# Patient Record
Sex: MALE | Race: WHITE | Employment: OTHER | URBAN - NONMETROPOLITAN AREA
[De-identification: names, ages, dates, MRNs, and addresses within clinical notes are randomized per-mention and may not be internally consistent; named-entity substitution may affect disease eponyms.]

---

## 2017-03-20 ENCOUNTER — OFFICE VISIT (OUTPATIENT)
Dept: FAMILY MEDICINE CLINIC | Age: 82
End: 2017-03-20

## 2017-03-20 VITALS
SYSTOLIC BLOOD PRESSURE: 140 MMHG | HEART RATE: 96 BPM | WEIGHT: 200.8 LBS | BODY MASS INDEX: 28.11 KG/M2 | DIASTOLIC BLOOD PRESSURE: 62 MMHG | HEIGHT: 71 IN

## 2017-03-20 DIAGNOSIS — E78.5 HYPERLIPIDEMIA, UNSPECIFIED HYPERLIPIDEMIA TYPE: ICD-10-CM

## 2017-03-20 DIAGNOSIS — I10 ESSENTIAL HYPERTENSION: Primary | ICD-10-CM

## 2017-03-20 PROCEDURE — 99213 OFFICE O/P EST LOW 20 MIN: CPT | Performed by: FAMILY MEDICINE

## 2017-03-20 RX ORDER — SIMVASTATIN 5 MG
5 TABLET ORAL NIGHTLY
Qty: 90 TABLET | Refills: 1 | Status: SHIPPED | OUTPATIENT
Start: 2017-03-20 | End: 2017-09-18 | Stop reason: SDUPTHER

## 2017-03-20 RX ORDER — LISINOPRIL AND HYDROCHLOROTHIAZIDE 12.5; 1 MG/1; MG/1
1 TABLET ORAL DAILY
Qty: 90 TABLET | Refills: 1 | Status: SHIPPED | OUTPATIENT
Start: 2017-03-20 | End: 2017-09-18 | Stop reason: SINTOL

## 2017-03-20 ASSESSMENT — ENCOUNTER SYMPTOMS
GASTROINTESTINAL NEGATIVE: 1
SHORTNESS OF BREATH: 0
RESPIRATORY NEGATIVE: 1
ORTHOPNEA: 0

## 2017-09-18 ENCOUNTER — OFFICE VISIT (OUTPATIENT)
Dept: FAMILY MEDICINE CLINIC | Age: 82
End: 2017-09-18
Payer: MEDICARE

## 2017-09-18 VITALS
WEIGHT: 199.7 LBS | HEART RATE: 88 BPM | HEIGHT: 71 IN | BODY MASS INDEX: 27.96 KG/M2 | SYSTOLIC BLOOD PRESSURE: 120 MMHG | DIASTOLIC BLOOD PRESSURE: 72 MMHG

## 2017-09-18 DIAGNOSIS — I95.1 ORTHOSTATIC HYPOTENSION: ICD-10-CM

## 2017-09-18 DIAGNOSIS — E78.5 HYPERLIPIDEMIA, UNSPECIFIED HYPERLIPIDEMIA TYPE: ICD-10-CM

## 2017-09-18 DIAGNOSIS — I10 ESSENTIAL HYPERTENSION: Primary | ICD-10-CM

## 2017-09-18 PROCEDURE — 99213 OFFICE O/P EST LOW 20 MIN: CPT | Performed by: FAMILY MEDICINE

## 2017-09-18 RX ORDER — SIMVASTATIN 5 MG
5 TABLET ORAL NIGHTLY
Qty: 90 TABLET | Refills: 1 | Status: SHIPPED | OUTPATIENT
Start: 2017-09-18 | End: 2018-03-19 | Stop reason: SDUPTHER

## 2017-09-18 ASSESSMENT — ENCOUNTER SYMPTOMS
RESPIRATORY NEGATIVE: 1
GASTROINTESTINAL NEGATIVE: 1
SHORTNESS OF BREATH: 0
ORTHOPNEA: 0

## 2017-09-19 ENCOUNTER — TELEPHONE (OUTPATIENT)
Dept: FAMILY MEDICINE CLINIC | Age: 82
End: 2017-09-19

## 2017-09-19 ENCOUNTER — NURSE ONLY (OUTPATIENT)
Dept: FAMILY MEDICINE CLINIC | Age: 82
End: 2017-09-19
Payer: MEDICARE

## 2017-09-19 DIAGNOSIS — E78.5 HYPERLIPIDEMIA, UNSPECIFIED HYPERLIPIDEMIA TYPE: ICD-10-CM

## 2017-09-19 LAB
ALBUMIN SERPL-MCNC: 4 G/DL (ref 3.5–5.1)
ALP BLD-CCNC: 169 U/L (ref 38–126)
ALT SERPL-CCNC: 18 U/L (ref 11–66)
ANION GAP SERPL CALCULATED.3IONS-SCNC: 13 MEQ/L (ref 8–16)
AST SERPL-CCNC: 19 U/L (ref 5–40)
BILIRUB SERPL-MCNC: 0.9 MG/DL (ref 0.3–1.2)
BUN BLDV-MCNC: 23 MG/DL (ref 7–22)
CALCIUM SERPL-MCNC: 9.2 MG/DL (ref 8.5–10.5)
CHLORIDE BLD-SCNC: 95 MEQ/L (ref 98–111)
CHOLESTEROL, TOTAL: 160 MG/DL (ref 100–199)
CO2: 28 MEQ/L (ref 23–33)
CREAT SERPL-MCNC: 1.6 MG/DL (ref 0.4–1.2)
GFR SERPL CREATININE-BSD FRML MDRD: 41 ML/MIN/1.73M2
GLUCOSE BLD-MCNC: 93 MG/DL (ref 70–108)
HDLC SERPL-MCNC: 38 MG/DL
LDL CHOLESTEROL CALCULATED: 93 MG/DL
POTASSIUM SERPL-SCNC: 4 MEQ/L (ref 3.5–5.2)
SODIUM BLD-SCNC: 136 MEQ/L (ref 135–145)
TOTAL PROTEIN: 7.4 G/DL (ref 6.1–8)
TRIGL SERPL-MCNC: 144 MG/DL (ref 0–199)

## 2017-09-19 PROCEDURE — 36415 COLL VENOUS BLD VENIPUNCTURE: CPT | Performed by: FAMILY MEDICINE

## 2017-10-30 ENCOUNTER — OFFICE VISIT (OUTPATIENT)
Dept: FAMILY MEDICINE CLINIC | Age: 82
End: 2017-10-30
Payer: MEDICARE

## 2017-10-30 VITALS
HEIGHT: 71 IN | SYSTOLIC BLOOD PRESSURE: 160 MMHG | WEIGHT: 203.2 LBS | DIASTOLIC BLOOD PRESSURE: 90 MMHG | BODY MASS INDEX: 28.45 KG/M2 | HEART RATE: 80 BPM

## 2017-10-30 DIAGNOSIS — I10 ESSENTIAL HYPERTENSION: Primary | ICD-10-CM

## 2017-10-30 PROCEDURE — 1123F ACP DISCUSS/DSCN MKR DOCD: CPT | Performed by: FAMILY MEDICINE

## 2017-10-30 PROCEDURE — 99213 OFFICE O/P EST LOW 20 MIN: CPT | Performed by: FAMILY MEDICINE

## 2017-10-30 PROCEDURE — G8419 CALC BMI OUT NRM PARAM NOF/U: HCPCS | Performed by: FAMILY MEDICINE

## 2017-10-30 PROCEDURE — 1036F TOBACCO NON-USER: CPT | Performed by: FAMILY MEDICINE

## 2017-10-30 PROCEDURE — 4040F PNEUMOC VAC/ADMIN/RCVD: CPT | Performed by: FAMILY MEDICINE

## 2017-10-30 PROCEDURE — G8427 DOCREV CUR MEDS BY ELIG CLIN: HCPCS | Performed by: FAMILY MEDICINE

## 2017-10-30 PROCEDURE — G8484 FLU IMMUNIZE NO ADMIN: HCPCS | Performed by: FAMILY MEDICINE

## 2017-10-30 RX ORDER — AMLODIPINE BESYLATE 2.5 MG/1
2.5 TABLET ORAL DAILY
Qty: 30 TABLET | Refills: 2 | Status: SHIPPED | OUTPATIENT
Start: 2017-10-30 | End: 2017-11-13 | Stop reason: SDUPTHER

## 2017-10-30 NOTE — PROGRESS NOTES
Name:  Rehan Sheriff  :    1933    Chief Complaint   Patient presents with    Dizziness     vertigo-balance issues       HPI:  Art is a little hard to figure out. See last note. Orthostatic falls. Stopped BP meds and no more falls, but has trouble getting up after sitting awhile. Uses walker at home. Seems multiple causes. Joints. Vascular disease. Physical Exam:      BP (!) 160/90 (Site: Left Arm, Position: Sitting, Cuff Size: Large Adult)   Pulse 80   Ht 5' 10.5\" (1.791 m)   Wt 203 lb 3.2 oz (92.2 kg)   BMI 28.74 kg/m²     BP is 190/106 standing. Sitting drops to 150/90. Heart with some ectopics. Lungs are clear. A&O. Here with wife and son. Assessment/Plan:      HTN. Lightheadedness without vertigo. Will use low dose Norvasc. Jon Irizarry was seen today for dizziness. Diagnoses and all orders for this visit:    Essential hypertension    Other orders  -     amLODIPine (NORVASC) 2.5 MG tablet;  Take 1 tablet by mouth daily

## 2017-11-06 ENCOUNTER — OFFICE VISIT (OUTPATIENT)
Dept: FAMILY MEDICINE CLINIC | Age: 82
End: 2017-11-06
Payer: MEDICARE

## 2017-11-06 VITALS
SYSTOLIC BLOOD PRESSURE: 172 MMHG | HEART RATE: 88 BPM | BODY MASS INDEX: 28.15 KG/M2 | WEIGHT: 199 LBS | DIASTOLIC BLOOD PRESSURE: 90 MMHG

## 2017-11-06 DIAGNOSIS — I10 ESSENTIAL HYPERTENSION: Primary | ICD-10-CM

## 2017-11-06 PROCEDURE — G8419 CALC BMI OUT NRM PARAM NOF/U: HCPCS | Performed by: FAMILY MEDICINE

## 2017-11-06 PROCEDURE — G8484 FLU IMMUNIZE NO ADMIN: HCPCS | Performed by: FAMILY MEDICINE

## 2017-11-06 PROCEDURE — 99212 OFFICE O/P EST SF 10 MIN: CPT | Performed by: FAMILY MEDICINE

## 2017-11-06 PROCEDURE — 4040F PNEUMOC VAC/ADMIN/RCVD: CPT | Performed by: FAMILY MEDICINE

## 2017-11-06 PROCEDURE — 1036F TOBACCO NON-USER: CPT | Performed by: FAMILY MEDICINE

## 2017-11-06 PROCEDURE — G8427 DOCREV CUR MEDS BY ELIG CLIN: HCPCS | Performed by: FAMILY MEDICINE

## 2017-11-06 PROCEDURE — 1123F ACP DISCUSS/DSCN MKR DOCD: CPT | Performed by: FAMILY MEDICINE

## 2017-11-07 NOTE — PROGRESS NOTES
Name:  Betty Rojas  :    1933    Chief Complaint   Patient presents with    Hypertension     states blood pressure has been elevated at home more balance issues       HPI:  See last note. No more falls off Prinzide, but BP has rebounded. No SOB or CP. We started Norvasc with no side effects. Physical Exam:      BP (!) 172/90 (Site: Right Arm, Position: Sitting, Cuff Size: Large Adult)   Pulse 88   Wt 199 lb (90.3 kg)   BMI 28.15 kg/m²     A&O. Lungs are clear. Heart in RRR with no murmur. No edema. BP high      Assessment/Plan:      HTN  Push Norvasc up. Call report in 1 week. Will need RX's. Anjalimirabear Oliver was seen today for hypertension.     Diagnoses and all orders for this visit:    Essential hypertension

## 2017-11-13 ENCOUNTER — TELEPHONE (OUTPATIENT)
Dept: FAMILY MEDICINE CLINIC | Age: 82
End: 2017-11-13

## 2017-11-13 RX ORDER — AMLODIPINE BESYLATE 5 MG/1
5 TABLET ORAL DAILY
Qty: 30 TABLET | Refills: 3 | Status: SHIPPED | OUTPATIENT
Start: 2017-11-13 | End: 2017-11-21 | Stop reason: DRUGHIGH

## 2017-11-13 RX ORDER — AMLODIPINE BESYLATE 5 MG/1
5 TABLET ORAL DAILY
Qty: 30 TABLET | Refills: 3 | Status: SHIPPED | OUTPATIENT
Start: 2017-11-13 | End: 2017-11-13 | Stop reason: SDUPTHER

## 2017-11-13 NOTE — TELEPHONE ENCOUNTER
Rx sent to [facility] James Marinelli. Needs to go to [facility]. Rx canceled @ [facility]. Please resend.

## 2017-11-13 NOTE — TELEPHONE ENCOUNTER
Art phoned with blood pressure readings- and while I was talking to him, his wife was on the phone as well. Said that his pressure this morning was 154/80. . Samara Pool states that she feels that his Norvasc 2.5 mg needs to be adjusted because she said that his pressures are running 140s-190's/88-90. Says that when he eats salty foods his pressure goes up even more. Explained to both of them to limit sodium intake as it will raise blood pressure.   Patient uses [address] if prescription adjustment    Art: [phone]

## 2017-11-21 ENCOUNTER — TELEPHONE (OUTPATIENT)
Dept: FAMILY MEDICINE CLINIC | Age: 82
End: 2017-11-21

## 2017-11-21 ENCOUNTER — NURSE ONLY (OUTPATIENT)
Dept: FAMILY MEDICINE CLINIC | Age: 82
End: 2017-11-21

## 2017-11-21 VITALS — HEART RATE: 94 BPM | DIASTOLIC BLOOD PRESSURE: 90 MMHG | SYSTOLIC BLOOD PRESSURE: 185 MMHG

## 2017-11-21 DIAGNOSIS — Z01.30 BLOOD PRESSURE CHECK: Primary | ICD-10-CM

## 2017-11-21 DIAGNOSIS — I10 ESSENTIAL HYPERTENSION: Primary | ICD-10-CM

## 2017-11-21 RX ORDER — AMLODIPINE BESYLATE 10 MG/1
10 TABLET ORAL DAILY
Qty: 30 TABLET | Refills: 3 | Status: SHIPPED | OUTPATIENT
Start: 2017-11-21 | End: 2018-03-19 | Stop reason: SDUPTHER

## 2017-11-21 RX ORDER — AMLODIPINE BESYLATE 10 MG/1
10 TABLET ORAL DAILY
COMMUNITY
End: 2017-11-21 | Stop reason: SDUPTHER

## 2017-11-21 NOTE — PROGRESS NOTES
Art came in for a non-charge BP check. It was 162/84 audible-88 irregular. Per his home BP cuff, it was 185/90-94. His BP cuff is older. They are discussing getting a new one. His Apical pulse was very irregular upon auscultaion. Dr. Josh Solis check ed him. States it was more regular for him, but was having some ectopics. Dr. Josh Solis increased his Amlodipine to 10mg daily. They will come back in next week for another  BP check.

## 2017-11-28 ENCOUNTER — NURSE ONLY (OUTPATIENT)
Dept: FAMILY MEDICINE CLINIC | Age: 82
End: 2017-11-28

## 2017-11-28 VITALS — HEART RATE: 88 BPM | DIASTOLIC BLOOD PRESSURE: 72 MMHG | SYSTOLIC BLOOD PRESSURE: 134 MMHG

## 2017-11-28 DIAGNOSIS — I10 ESSENTIAL HYPERTENSION: Primary | ICD-10-CM

## 2017-12-12 ENCOUNTER — OFFICE VISIT (OUTPATIENT)
Dept: FAMILY MEDICINE CLINIC | Age: 82
End: 2017-12-12
Payer: MEDICARE

## 2017-12-12 VITALS
BODY MASS INDEX: 27.47 KG/M2 | SYSTOLIC BLOOD PRESSURE: 146 MMHG | WEIGHT: 196.2 LBS | DIASTOLIC BLOOD PRESSURE: 90 MMHG | HEIGHT: 71 IN | HEART RATE: 62 BPM

## 2017-12-12 DIAGNOSIS — I10 ESSENTIAL HYPERTENSION: Primary | ICD-10-CM

## 2017-12-12 DIAGNOSIS — N18.30 CHRONIC RENAL FAILURE, STAGE 3 (MODERATE) (HCC): Chronic | ICD-10-CM

## 2017-12-12 LAB
ANION GAP SERPL CALCULATED.3IONS-SCNC: 18 MEQ/L (ref 8–16)
BUN BLDV-MCNC: 12 MG/DL (ref 7–22)
CALCIUM SERPL-MCNC: 9.1 MG/DL (ref 8.5–10.5)
CHLORIDE BLD-SCNC: 95 MEQ/L (ref 98–111)
CO2: 27 MEQ/L (ref 23–33)
CREAT SERPL-MCNC: 1.3 MG/DL (ref 0.4–1.2)
GFR SERPL CREATININE-BSD FRML MDRD: 53 ML/MIN/1.73M2
GLUCOSE BLD-MCNC: 140 MG/DL (ref 70–108)
POTASSIUM SERPL-SCNC: 3.6 MEQ/L (ref 3.5–5.2)
SODIUM BLD-SCNC: 140 MEQ/L (ref 135–145)

## 2017-12-12 PROCEDURE — 4040F PNEUMOC VAC/ADMIN/RCVD: CPT | Performed by: FAMILY MEDICINE

## 2017-12-12 PROCEDURE — 36415 COLL VENOUS BLD VENIPUNCTURE: CPT | Performed by: FAMILY MEDICINE

## 2017-12-12 PROCEDURE — 1123F ACP DISCUSS/DSCN MKR DOCD: CPT | Performed by: FAMILY MEDICINE

## 2017-12-12 PROCEDURE — G8419 CALC BMI OUT NRM PARAM NOF/U: HCPCS | Performed by: FAMILY MEDICINE

## 2017-12-12 PROCEDURE — G8484 FLU IMMUNIZE NO ADMIN: HCPCS | Performed by: FAMILY MEDICINE

## 2017-12-12 PROCEDURE — 99213 OFFICE O/P EST LOW 20 MIN: CPT | Performed by: FAMILY MEDICINE

## 2017-12-12 PROCEDURE — 1036F TOBACCO NON-USER: CPT | Performed by: FAMILY MEDICINE

## 2017-12-12 PROCEDURE — G8427 DOCREV CUR MEDS BY ELIG CLIN: HCPCS | Performed by: FAMILY MEDICINE

## 2017-12-12 RX ORDER — LISINOPRIL 10 MG/1
10 TABLET ORAL DAILY
Qty: 30 TABLET | Refills: 0 | Status: SHIPPED | OUTPATIENT
Start: 2017-12-12 | End: 2017-12-26 | Stop reason: SDUPTHER

## 2017-12-12 NOTE — PROGRESS NOTES
[facility]ESSIONAL [facility]  [facility]  [address] [address]  Dept: [phone]  Dept Fax: [phone]  Loc: [phone]  PROGRESS NOTE      Visit Date: 12/12/2017    Mason Rahman is a 80 y.o. male who presents today for:  Chief Complaint   Patient presents with    Hypertension       Subjective:  HPI     HTN:  On norvasc 10 mg. Labile SBP 140s-210. DBP . He is limiting his salt intake. He a home arm cuff. No chest pain or SOB. Had been on prinzide before it being stopped due to lightheadedness. Wife is present    Review of Systems  Past Medical History:   Diagnosis Date    Hyperlipidemia     Hypertension     Osteoarthritis     PAC (premature atrial contraction)     Paget disease of bone       Past Surgical History:   Procedure Laterality Date    JOINT REPLACEMENT Bilateral 2013    HIP     Family History   Problem Relation Age of Onset    Diabetes Mother     Stroke Father     Stroke Sister     Cancer Brother     Heart Disease Brother     Stroke Brother      Social History   Substance Use Topics    Smoking status: Never Smoker    Smokeless tobacco: Never Used    Alcohol use No      Current Outpatient Prescriptions   Medication Sig Dispense Refill    amLODIPine (NORVASC) 10 MG tablet Take 1 tablet by mouth daily 30 tablet 3    simvastatin (ZOCOR) 5 MG tablet Take 1 tablet by mouth nightly 90 tablet 1     No current facility-administered medications for this visit.       Allergies   Allergen Reactions    Asa [Aspirin]      bleeding     Health Maintenance   Topic Date Due    DTaP/Tdap/Td vaccine (1 - Tdap) 09/19/1952    Flu vaccine (1) 03/20/2018 (Originally 9/1/2017)    Pneumococcal low/med risk (1 of 2 - PCV13) 03/20/2018 (Originally 9/19/1998)    Zostavax vaccine  Addressed       Objective:     BP (!) 158/90 (Site: Right Arm, Position: Sitting, Cuff Size: Medium Adult)   Pulse 62   Ht 5' 10.5\" (1.791 m)   Wt 196 lb 3.2 oz (89

## 2017-12-13 ENCOUNTER — TELEPHONE (OUTPATIENT)
Dept: FAMILY MEDICINE CLINIC | Age: 82
End: 2017-12-13

## 2017-12-26 ENCOUNTER — OFFICE VISIT (OUTPATIENT)
Dept: FAMILY MEDICINE CLINIC | Age: 82
End: 2017-12-26
Payer: MEDICARE

## 2017-12-26 VITALS
HEIGHT: 70 IN | WEIGHT: 192 LBS | BODY MASS INDEX: 27.49 KG/M2 | DIASTOLIC BLOOD PRESSURE: 74 MMHG | HEART RATE: 70 BPM | SYSTOLIC BLOOD PRESSURE: 130 MMHG

## 2017-12-26 DIAGNOSIS — I10 ESSENTIAL HYPERTENSION: ICD-10-CM

## 2017-12-26 DIAGNOSIS — Z91.81 AT HIGH RISK FOR FALLS: Primary | ICD-10-CM

## 2017-12-26 PROCEDURE — 99213 OFFICE O/P EST LOW 20 MIN: CPT | Performed by: FAMILY MEDICINE

## 2017-12-26 PROCEDURE — G8484 FLU IMMUNIZE NO ADMIN: HCPCS | Performed by: FAMILY MEDICINE

## 2017-12-26 PROCEDURE — G8427 DOCREV CUR MEDS BY ELIG CLIN: HCPCS | Performed by: FAMILY MEDICINE

## 2017-12-26 PROCEDURE — G8419 CALC BMI OUT NRM PARAM NOF/U: HCPCS | Performed by: FAMILY MEDICINE

## 2017-12-26 PROCEDURE — 1036F TOBACCO NON-USER: CPT | Performed by: FAMILY MEDICINE

## 2017-12-26 PROCEDURE — 4040F PNEUMOC VAC/ADMIN/RCVD: CPT | Performed by: FAMILY MEDICINE

## 2017-12-26 PROCEDURE — 1123F ACP DISCUSS/DSCN MKR DOCD: CPT | Performed by: FAMILY MEDICINE

## 2017-12-26 RX ORDER — LISINOPRIL 10 MG/1
10 TABLET ORAL DAILY
Qty: 90 TABLET | Refills: 1 | Status: SHIPPED | OUTPATIENT
Start: 2017-12-26 | End: 2018-03-19 | Stop reason: SDUPTHER

## 2017-12-26 ASSESSMENT — ENCOUNTER SYMPTOMS
SHORTNESS OF BREATH: 0
WHEEZING: 0

## 2017-12-26 NOTE — PATIENT INSTRUCTIONS
serving is 1 slice of bread, 1 ounce of dry cereal, or ½ cup of cooked rice, pasta, or cooked cereal. Try to choose whole-grain products as much as possible. · Limit lean meat, poultry, and fish to 2 servings each day. A serving is 3 ounces, about the size of a deck of cards. · Eat 4 to 5 servings of nuts, seeds, and legumes (cooked dried beans, lentils, and split peas) each week. A serving is 1/3 cup of nuts, 2 tablespoons of seeds, or ½ cup of cooked beans or peas. · Limit fats and oils to 2 to 3 servings each day. A serving is 1 teaspoon of vegetable oil or 2 tablespoons of salad dressing. · Limit sweets and added sugars to 5 servings or less a week. A serving is 1 tablespoon jelly or jam, ½ cup sorbet, or 1 cup of lemonade. · Eat less than 2,300 milligrams (mg) of sodium a day. If you limit your sodium to 1,500 mg a day, you can lower your blood pressure even more. Tips for success  · Start small. Do not try to make dramatic changes to your diet all at once. You might feel that you are missing out on your favorite foods and then be more likely to not follow the plan. Make small changes, and stick with them. Once those changes become habit, add a few more changes. · Try some of the following:  ¨ Make it a goal to eat a fruit or vegetable at every meal and at snacks. This will make it easy to get the recommended amount of fruits and vegetables each day. ¨ Try yogurt topped with fruit and nuts for a snack or healthy dessert. ¨ Add lettuce, tomato, cucumber, and onion to sandwiches. ¨ Combine a ready-made pizza crust with low-fat mozzarella cheese and lots of vegetable toppings. Try using tomatoes, squash, spinach, broccoli, carrots, cauliflower, and onions. ¨ Have a variety of cut-up vegetables with a low-fat dip as an appetizer instead of chips and dip. ¨ Sprinkle sunflower seeds or chopped almonds over salads. Or try adding chopped walnuts or almonds to cooked vegetables.   ¨ Try some vegetarian meals using beans and peas. Add garbanzo or kidney beans to salads. Make burritos and tacos with mashed virgen beans or black beans. Where can you learn more? Go to https://jujueb.Zbird. org and sign in to your bluebottlebiz account. Enter U583 in the [employer] box to learn more about \"DASH Diet: Care Instructions. \"     If you do not have an account, please click on the \"Sign Up Now\" link. Current as of: September 21, 2016  Content Version: 11.4  © 0854-6145 Healthwise, Incorporated. Care instructions adapted under license by [facility] [place] ([facility]). If you have questions about a medical condition or this instruction, always ask your healthcare professional. [address] any warranty or liability for your use of this information.

## 2018-03-19 ENCOUNTER — OFFICE VISIT (OUTPATIENT)
Dept: FAMILY MEDICINE CLINIC | Age: 83
End: 2018-03-19
Payer: MEDICARE

## 2018-03-19 VITALS — SYSTOLIC BLOOD PRESSURE: 138 MMHG | HEART RATE: 76 BPM | DIASTOLIC BLOOD PRESSURE: 80 MMHG

## 2018-03-19 DIAGNOSIS — I10 ESSENTIAL HYPERTENSION: ICD-10-CM

## 2018-03-19 DIAGNOSIS — E78.5 HYPERLIPIDEMIA, UNSPECIFIED HYPERLIPIDEMIA TYPE: ICD-10-CM

## 2018-03-19 PROCEDURE — 4040F PNEUMOC VAC/ADMIN/RCVD: CPT | Performed by: FAMILY MEDICINE

## 2018-03-19 PROCEDURE — G8419 CALC BMI OUT NRM PARAM NOF/U: HCPCS | Performed by: FAMILY MEDICINE

## 2018-03-19 PROCEDURE — 1123F ACP DISCUSS/DSCN MKR DOCD: CPT | Performed by: FAMILY MEDICINE

## 2018-03-19 PROCEDURE — G8427 DOCREV CUR MEDS BY ELIG CLIN: HCPCS | Performed by: FAMILY MEDICINE

## 2018-03-19 PROCEDURE — G8484 FLU IMMUNIZE NO ADMIN: HCPCS | Performed by: FAMILY MEDICINE

## 2018-03-19 PROCEDURE — 1036F TOBACCO NON-USER: CPT | Performed by: FAMILY MEDICINE

## 2018-03-19 PROCEDURE — 99213 OFFICE O/P EST LOW 20 MIN: CPT | Performed by: FAMILY MEDICINE

## 2018-03-19 RX ORDER — SIMVASTATIN 5 MG
5 TABLET ORAL NIGHTLY
Qty: 90 TABLET | Refills: 0 | Status: SHIPPED | OUTPATIENT
Start: 2018-03-19 | End: 2018-05-23 | Stop reason: SDUPTHER

## 2018-03-19 RX ORDER — LISINOPRIL 10 MG/1
10 TABLET ORAL DAILY
Qty: 90 TABLET | Refills: 0 | Status: SHIPPED | OUTPATIENT
Start: 2018-03-19 | End: 2018-05-23 | Stop reason: SDUPTHER

## 2018-03-19 RX ORDER — AMLODIPINE BESYLATE 10 MG/1
10 TABLET ORAL DAILY
Qty: 90 TABLET | Refills: 0 | Status: SHIPPED | OUTPATIENT
Start: 2018-03-19 | End: 2018-05-23 | Stop reason: SDUPTHER

## 2018-03-19 ASSESSMENT — ENCOUNTER SYMPTOMS
GASTROINTESTINAL NEGATIVE: 1
SHORTNESS OF BREATH: 0
RESPIRATORY NEGATIVE: 1
ORTHOPNEA: 0

## 2018-05-23 DIAGNOSIS — E78.5 HYPERLIPIDEMIA, UNSPECIFIED HYPERLIPIDEMIA TYPE: ICD-10-CM

## 2018-05-23 DIAGNOSIS — I10 ESSENTIAL HYPERTENSION: ICD-10-CM

## 2018-05-24 RX ORDER — SIMVASTATIN 5 MG
TABLET ORAL
Qty: 90 TABLET | Refills: 0 | Status: SHIPPED | OUTPATIENT
Start: 2018-05-24 | End: 2018-07-26 | Stop reason: SDUPTHER

## 2018-05-24 RX ORDER — LISINOPRIL 10 MG/1
TABLET ORAL
Qty: 90 TABLET | Refills: 0 | Status: SHIPPED | OUTPATIENT
Start: 2018-05-24 | End: 2018-07-26 | Stop reason: SDUPTHER

## 2018-05-24 RX ORDER — AMLODIPINE BESYLATE 10 MG/1
TABLET ORAL
Qty: 90 TABLET | Refills: 0 | Status: SHIPPED | OUTPATIENT
Start: 2018-05-24 | End: 2018-07-26 | Stop reason: SDUPTHER

## 2018-07-14 ENCOUNTER — APPOINTMENT (OUTPATIENT)
Dept: CT IMAGING | Age: 83
DRG: 418 | End: 2018-07-14
Payer: MEDICARE

## 2018-07-14 ENCOUNTER — HOSPITAL ENCOUNTER (INPATIENT)
Age: 83
LOS: 4 days | Discharge: HOME HEALTH | DRG: 418 | End: 2018-07-18
Attending: INTERNAL MEDICINE | Admitting: SURGERY
Payer: MEDICARE

## 2018-07-14 ENCOUNTER — APPOINTMENT (OUTPATIENT)
Dept: GENERAL RADIOLOGY | Age: 83
DRG: 418 | End: 2018-07-14
Payer: MEDICARE

## 2018-07-14 ENCOUNTER — APPOINTMENT (OUTPATIENT)
Dept: ULTRASOUND IMAGING | Age: 83
DRG: 418 | End: 2018-07-14
Payer: MEDICARE

## 2018-07-14 DIAGNOSIS — Z98.890 S/P RIGHT INGUINAL HERNIA REPAIR: ICD-10-CM

## 2018-07-14 DIAGNOSIS — R10.9 ABDOMINAL PAIN, UNSPECIFIED ABDOMINAL LOCATION: Primary | ICD-10-CM

## 2018-07-14 DIAGNOSIS — Z90.49 S/P LAPAROSCOPIC CHOLECYSTECTOMY: ICD-10-CM

## 2018-07-14 DIAGNOSIS — G89.18 ACUTE POSTOPERATIVE PAIN: ICD-10-CM

## 2018-07-14 DIAGNOSIS — Z87.19 S/P RIGHT INGUINAL HERNIA REPAIR: ICD-10-CM

## 2018-07-14 PROBLEM — K81.9 CHOLECYSTITIS: Status: ACTIVE | Noted: 2018-07-14

## 2018-07-14 LAB
ALBUMIN SERPL-MCNC: 4 G/DL (ref 3.5–5.1)
ALP BLD-CCNC: 201 U/L (ref 38–126)
ALT SERPL-CCNC: 9 U/L (ref 11–66)
AMYLASE: 68 U/L (ref 20–104)
ANION GAP SERPL CALCULATED.3IONS-SCNC: 17 MEQ/L (ref 8–16)
AST SERPL-CCNC: 17 U/L (ref 5–40)
BACTERIA: ABNORMAL /HPF
BASOPHILS # BLD: 0.1 %
BASOPHILS ABSOLUTE: 0 THOU/MM3 (ref 0–0.1)
BILIRUB SERPL-MCNC: 0.6 MG/DL (ref 0.3–1.2)
BILIRUBIN DIRECT: < 0.2 MG/DL (ref 0–0.3)
BILIRUBIN URINE: NEGATIVE
BLOOD, URINE: ABNORMAL
BUN BLDV-MCNC: 10 MG/DL (ref 7–22)
CALCIUM IONIZED: 1.06 MMOL/L (ref 1.12–1.32)
CALCIUM SERPL-MCNC: 8.9 MG/DL (ref 8.5–10.5)
CASTS 2: ABNORMAL /LPF
CASTS UA: ABNORMAL /LPF
CHARACTER, URINE: CLEAR
CHLORIDE BLD-SCNC: 99 MEQ/L (ref 98–111)
CHP ED QC CHECK: NORMAL
CO2: 21 MEQ/L (ref 23–33)
COLOR: YELLOW
CREAT SERPL-MCNC: 1.5 MG/DL (ref 0.4–1.2)
CRYSTALS, UA: ABNORMAL
EKG ATRIAL RATE: 88 BPM
EKG P AXIS: 95 DEGREES
EKG P-R INTERVAL: 176 MS
EKG Q-T INTERVAL: 382 MS
EKG QRS DURATION: 84 MS
EKG QTC CALCULATION (BAZETT): 462 MS
EKG R AXIS: 1 DEGREES
EKG T AXIS: 40 DEGREES
EKG VENTRICULAR RATE: 88 BPM
EOSINOPHIL # BLD: 0 %
EOSINOPHILS ABSOLUTE: 0 THOU/MM3 (ref 0–0.4)
EPITHELIAL CELLS, UA: ABNORMAL /HPF
ERYTHROCYTE [DISTWIDTH] IN BLOOD BY AUTOMATED COUNT: 12.9 % (ref 11.5–14.5)
ERYTHROCYTE [DISTWIDTH] IN BLOOD BY AUTOMATED COUNT: 41.9 FL (ref 35–45)
GFR SERPL CREATININE-BSD FRML MDRD: 44 ML/MIN/1.73M2
GLUCOSE BLD-MCNC: 149 MG/DL (ref 70–108)
GLUCOSE BLD-MCNC: 152 MG/DL
GLUCOSE BLD-MCNC: 152 MG/DL (ref 70–108)
GLUCOSE URINE: NEGATIVE MG/DL
HCT VFR BLD CALC: 41.6 % (ref 42–52)
HEMOGLOBIN: 14.2 GM/DL (ref 14–18)
IMMATURE GRANS (ABS): 0.04 THOU/MM3 (ref 0–0.07)
IMMATURE GRANULOCYTES: 0.3 %
KETONES, URINE: NEGATIVE
LACTIC ACID: 1.8 MMOL/L (ref 0.5–2.2)
LACTIC ACID: 2.9 MMOL/L (ref 0.5–2.2)
LEUKOCYTE ESTERASE, URINE: NEGATIVE
LIPASE: 26.6 U/L (ref 5.6–51.3)
LYMPHOCYTES # BLD: 4.3 %
LYMPHOCYTES ABSOLUTE: 0.5 THOU/MM3 (ref 1–4.8)
MAGNESIUM: 1.6 MG/DL (ref 1.6–2.4)
MCH RBC QN AUTO: 30.6 PG (ref 26–33)
MCHC RBC AUTO-ENTMCNC: 34.1 GM/DL (ref 32.2–35.5)
MCV RBC AUTO: 89.7 FL (ref 80–94)
MISCELLANEOUS 2: ABNORMAL
MONOCYTES # BLD: 4.5 %
MONOCYTES ABSOLUTE: 0.5 THOU/MM3 (ref 0.4–1.3)
NITRITE, URINE: NEGATIVE
NUCLEATED RED BLOOD CELLS: 0 /100 WBC
OSMOLALITY CALCULATION: 275.7 MOSMOL/KG (ref 275–300)
PH UA: 7
PHOSPHORUS: 2.1 MG/DL (ref 2.4–4.7)
PLATELET # BLD: 181 THOU/MM3 (ref 130–400)
PLATELET ESTIMATE: ADEQUATE
PMV BLD AUTO: 9.5 FL (ref 9.4–12.4)
POTASSIUM SERPL-SCNC: 3.6 MEQ/L (ref 3.5–5.2)
PRO-BNP: 552 PG/ML (ref 0–1800)
PROCALCITONIN: 0.36 NG/ML (ref 0.01–0.09)
PROTEIN UA: NEGATIVE
RBC # BLD: 4.64 MILL/MM3 (ref 4.7–6.1)
RBC URINE: ABNORMAL /HPF
RENAL EPITHELIAL, UA: ABNORMAL
SCAN OF BLOOD SMEAR: NORMAL
SEDIMENTATION RATE, ERYTHROCYTE: 28 MM/HR (ref 0–10)
SEG NEUTROPHILS: 90.8 %
SEGMENTED NEUTROPHILS ABSOLUTE COUNT: 10.4 THOU/MM3 (ref 1.8–7.7)
SODIUM BLD-SCNC: 137 MEQ/L (ref 135–145)
SPECIFIC GRAVITY, URINE: > 1.03 (ref 1–1.03)
TOTAL PROTEIN: 7.1 G/DL (ref 6.1–8)
TROPONIN T: < 0.01 NG/ML
TSH SERPL DL<=0.05 MIU/L-ACNC: 1.14 UIU/ML (ref 0.4–4.2)
UROBILINOGEN, URINE: 1 EU/DL
WBC # BLD: 11.4 THOU/MM3 (ref 4.8–10.8)
WBC UA: ABNORMAL /HPF
YEAST: ABNORMAL

## 2018-07-14 PROCEDURE — 85025 COMPLETE CBC W/AUTO DIFF WBC: CPT

## 2018-07-14 PROCEDURE — 93010 ELECTROCARDIOGRAM REPORT: CPT | Performed by: INTERNAL MEDICINE

## 2018-07-14 PROCEDURE — 83690 ASSAY OF LIPASE: CPT

## 2018-07-14 PROCEDURE — 70450 CT HEAD/BRAIN W/O DYE: CPT

## 2018-07-14 PROCEDURE — 82150 ASSAY OF AMYLASE: CPT

## 2018-07-14 PROCEDURE — 84443 ASSAY THYROID STIM HORMONE: CPT

## 2018-07-14 PROCEDURE — 74177 CT ABD & PELVIS W/CONTRAST: CPT

## 2018-07-14 PROCEDURE — 82248 BILIRUBIN DIRECT: CPT

## 2018-07-14 PROCEDURE — 84145 PROCALCITONIN (PCT): CPT

## 2018-07-14 PROCEDURE — 76705 ECHO EXAM OF ABDOMEN: CPT

## 2018-07-14 PROCEDURE — 82948 REAGENT STRIP/BLOOD GLUCOSE: CPT

## 2018-07-14 PROCEDURE — 84100 ASSAY OF PHOSPHORUS: CPT

## 2018-07-14 PROCEDURE — 83605 ASSAY OF LACTIC ACID: CPT

## 2018-07-14 PROCEDURE — 99285 EMERGENCY DEPT VISIT HI MDM: CPT

## 2018-07-14 PROCEDURE — 83880 ASSAY OF NATRIURETIC PEPTIDE: CPT

## 2018-07-14 PROCEDURE — 99223 1ST HOSP IP/OBS HIGH 75: CPT | Performed by: SURGERY

## 2018-07-14 PROCEDURE — 82330 ASSAY OF CALCIUM: CPT

## 2018-07-14 PROCEDURE — 6360000002 HC RX W HCPCS: Performed by: SURGERY

## 2018-07-14 PROCEDURE — 81001 URINALYSIS AUTO W/SCOPE: CPT

## 2018-07-14 PROCEDURE — 85651 RBC SED RATE NONAUTOMATED: CPT

## 2018-07-14 PROCEDURE — 2580000003 HC RX 258: Performed by: RADIOLOGY

## 2018-07-14 PROCEDURE — 2580000003 HC RX 258: Performed by: INTERNAL MEDICINE

## 2018-07-14 PROCEDURE — 80053 COMPREHEN METABOLIC PANEL: CPT

## 2018-07-14 PROCEDURE — 6360000002 HC RX W HCPCS: Performed by: INTERNAL MEDICINE

## 2018-07-14 PROCEDURE — 96361 HYDRATE IV INFUSION ADD-ON: CPT

## 2018-07-14 PROCEDURE — 6360000004 HC RX CONTRAST MEDICATION: Performed by: INTERNAL MEDICINE

## 2018-07-14 PROCEDURE — 36415 COLL VENOUS BLD VENIPUNCTURE: CPT

## 2018-07-14 PROCEDURE — 83735 ASSAY OF MAGNESIUM: CPT

## 2018-07-14 PROCEDURE — 1200000003 HC TELEMETRY R&B

## 2018-07-14 PROCEDURE — 71046 X-RAY EXAM CHEST 2 VIEWS: CPT

## 2018-07-14 PROCEDURE — 2709999900 HC NON-CHARGEABLE SUPPLY

## 2018-07-14 PROCEDURE — 93005 ELECTROCARDIOGRAM TRACING: CPT | Performed by: INTERNAL MEDICINE

## 2018-07-14 PROCEDURE — 2580000003 HC RX 258: Performed by: SURGERY

## 2018-07-14 PROCEDURE — 84484 ASSAY OF TROPONIN QUANT: CPT

## 2018-07-14 PROCEDURE — 96374 THER/PROPH/DIAG INJ IV PUSH: CPT

## 2018-07-14 PROCEDURE — 6370000000 HC RX 637 (ALT 250 FOR IP): Performed by: SURGERY

## 2018-07-14 PROCEDURE — 96375 TX/PRO/DX INJ NEW DRUG ADDON: CPT

## 2018-07-14 RX ORDER — AMLODIPINE BESYLATE 10 MG/1
10 TABLET ORAL DAILY
Status: DISCONTINUED | OUTPATIENT
Start: 2018-07-14 | End: 2018-07-18 | Stop reason: HOSPADM

## 2018-07-14 RX ORDER — SODIUM CHLORIDE 9 MG/ML
INJECTION, SOLUTION INTRAVENOUS CONTINUOUS
Status: DISCONTINUED | OUTPATIENT
Start: 2018-07-14 | End: 2018-07-14

## 2018-07-14 RX ORDER — ACETAMINOPHEN 325 MG/1
650 TABLET ORAL EVERY 4 HOURS PRN
Status: DISCONTINUED | OUTPATIENT
Start: 2018-07-14 | End: 2018-07-18 | Stop reason: HOSPADM

## 2018-07-14 RX ORDER — SIMVASTATIN 10 MG
5 TABLET ORAL NIGHTLY
Status: DISCONTINUED | OUTPATIENT
Start: 2018-07-14 | End: 2018-07-18 | Stop reason: HOSPADM

## 2018-07-14 RX ORDER — FENTANYL CITRATE 50 UG/ML
50 INJECTION, SOLUTION INTRAMUSCULAR; INTRAVENOUS ONCE
Status: COMPLETED | OUTPATIENT
Start: 2018-07-14 | End: 2018-07-14

## 2018-07-14 RX ORDER — ONDANSETRON 2 MG/ML
4 INJECTION INTRAMUSCULAR; INTRAVENOUS EVERY 6 HOURS PRN
Status: DISCONTINUED | OUTPATIENT
Start: 2018-07-14 | End: 2018-07-18 | Stop reason: HOSPADM

## 2018-07-14 RX ORDER — SODIUM CHLORIDE 0.9 % (FLUSH) 0.9 %
10 SYRINGE (ML) INJECTION PRN
Status: DISCONTINUED | OUTPATIENT
Start: 2018-07-14 | End: 2018-07-18 | Stop reason: HOSPADM

## 2018-07-14 RX ORDER — OXYCODONE HYDROCHLORIDE AND ACETAMINOPHEN 5; 325 MG/1; MG/1
1 TABLET ORAL EVERY 4 HOURS PRN
Status: DISCONTINUED | OUTPATIENT
Start: 2018-07-14 | End: 2018-07-18 | Stop reason: HOSPADM

## 2018-07-14 RX ORDER — LISINOPRIL 10 MG/1
10 TABLET ORAL DAILY
Status: DISCONTINUED | OUTPATIENT
Start: 2018-07-14 | End: 2018-07-18 | Stop reason: HOSPADM

## 2018-07-14 RX ORDER — SODIUM CHLORIDE 9 MG/ML
INJECTION, SOLUTION INTRAVENOUS CONTINUOUS
Status: ACTIVE | OUTPATIENT
Start: 2018-07-14 | End: 2018-07-14

## 2018-07-14 RX ORDER — ONDANSETRON 2 MG/ML
4 INJECTION INTRAMUSCULAR; INTRAVENOUS ONCE
Status: COMPLETED | OUTPATIENT
Start: 2018-07-14 | End: 2018-07-14

## 2018-07-14 RX ORDER — SODIUM CHLORIDE 0.9 % (FLUSH) 0.9 %
10 SYRINGE (ML) INJECTION EVERY 12 HOURS SCHEDULED
Status: DISCONTINUED | OUTPATIENT
Start: 2018-07-14 | End: 2018-07-18 | Stop reason: HOSPADM

## 2018-07-14 RX ORDER — MORPHINE SULFATE 2 MG/ML
2 INJECTION, SOLUTION INTRAMUSCULAR; INTRAVENOUS EVERY 4 HOURS PRN
Status: COMPLETED | OUTPATIENT
Start: 2018-07-14 | End: 2018-07-17

## 2018-07-14 RX ORDER — 0.9 % SODIUM CHLORIDE 0.9 %
1000 INTRAVENOUS SOLUTION INTRAVENOUS ONCE
Status: COMPLETED | OUTPATIENT
Start: 2018-07-14 | End: 2018-07-14

## 2018-07-14 RX ORDER — CIPROFLOXACIN 2 MG/ML
400 INJECTION, SOLUTION INTRAVENOUS EVERY 12 HOURS
Status: DISCONTINUED | OUTPATIENT
Start: 2018-07-14 | End: 2018-07-18 | Stop reason: HOSPADM

## 2018-07-14 RX ORDER — SODIUM CHLORIDE 9 MG/ML
INJECTION, SOLUTION INTRAVENOUS CONTINUOUS
Status: DISCONTINUED | OUTPATIENT
Start: 2018-07-14 | End: 2018-07-18 | Stop reason: HOSPADM

## 2018-07-14 RX ORDER — MORPHINE SULFATE 2 MG/ML
2 INJECTION, SOLUTION INTRAMUSCULAR; INTRAVENOUS ONCE
Status: COMPLETED | OUTPATIENT
Start: 2018-07-14 | End: 2018-07-14

## 2018-07-14 RX ADMIN — IOPAMIDOL 80 ML: 755 INJECTION, SOLUTION INTRAVENOUS at 10:44

## 2018-07-14 RX ADMIN — MORPHINE SULFATE 2 MG: 2 INJECTION, SOLUTION INTRAMUSCULAR; INTRAVENOUS at 09:26

## 2018-07-14 RX ADMIN — SODIUM CHLORIDE 1000 ML: 9 INJECTION, SOLUTION INTRAVENOUS at 09:15

## 2018-07-14 RX ADMIN — SODIUM CHLORIDE: 9 INJECTION, SOLUTION INTRAVENOUS at 11:01

## 2018-07-14 RX ADMIN — FENTANYL CITRATE 50 MCG: 50 INJECTION, SOLUTION INTRAMUSCULAR; INTRAVENOUS at 10:00

## 2018-07-14 RX ADMIN — SODIUM CHLORIDE: 9 INJECTION, SOLUTION INTRAVENOUS at 17:02

## 2018-07-14 RX ADMIN — CIPROFLOXACIN 400 MG: 2 INJECTION, SOLUTION INTRAVENOUS at 20:11

## 2018-07-14 RX ADMIN — AMLODIPINE BESYLATE 10 MG: 10 TABLET ORAL at 20:11

## 2018-07-14 RX ADMIN — ONDANSETRON HYDROCHLORIDE 4 MG: 4 INJECTION, SOLUTION INTRAMUSCULAR; INTRAVENOUS at 09:26

## 2018-07-14 RX ADMIN — SIMVASTATIN 5 MG: 10 TABLET, FILM COATED ORAL at 20:11

## 2018-07-14 RX ADMIN — LISINOPRIL 10 MG: 10 TABLET ORAL at 20:11

## 2018-07-14 ASSESSMENT — PAIN DESCRIPTION - LOCATION
LOCATION: ABDOMEN;HEAD
LOCATION: ABDOMEN
LOCATION: ABDOMEN
LOCATION: ABDOMEN;HEAD
LOCATION: ABDOMEN

## 2018-07-14 ASSESSMENT — ENCOUNTER SYMPTOMS
ABDOMINAL PAIN: 1
EYE REDNESS: 0
SORE THROAT: 0
WHEEZING: 0
CONSTIPATION: 1
VOMITING: 0
EYE DISCHARGE: 0
DIARRHEA: 0
SHORTNESS OF BREATH: 0
NAUSEA: 1
COUGH: 0
BACK PAIN: 0
RHINORRHEA: 0

## 2018-07-14 ASSESSMENT — PAIN SCALES - GENERAL
PAINLEVEL_OUTOF10: 7
PAINLEVEL_OUTOF10: 4
PAINLEVEL_OUTOF10: 5
PAINLEVEL_OUTOF10: 7
PAINLEVEL_OUTOF10: 7
PAINLEVEL_OUTOF10: 5
PAINLEVEL_OUTOF10: 7
PAINLEVEL_OUTOF10: 5
PAINLEVEL_OUTOF10: 5
PAINLEVEL_OUTOF10: 6

## 2018-07-14 ASSESSMENT — PAIN DESCRIPTION - PAIN TYPE
TYPE: ACUTE PAIN

## 2018-07-14 ASSESSMENT — PAIN DESCRIPTION - DESCRIPTORS
DESCRIPTORS: ACHING;DISCOMFORT
DESCRIPTORS: ACHING

## 2018-07-14 NOTE — ED TRIAGE NOTES
Patient presents to ED with c/o nausea and headache that started last night in his sleep. Patient is also c/o lower abdominal pain. Call light in reach.

## 2018-07-14 NOTE — ED PROVIDER NOTES
dizziness, syncope, weakness, light-headedness and numbness. Hematological: Negative for adenopathy. Psychiatric/Behavioral: Negative for confusion and suicidal ideas. The patient is not nervous/anxious. PAST MEDICAL HISTORY    has a past medical history of Hyperlipidemia; Hypertension; Osteoarthritis; PAC (premature atrial contraction); and Paget disease of bone. SURGICAL HISTORY      has a past surgical history that includes joint replacement (Bilateral, ). CURRENT MEDICATIONS       Previous Medications    AMLODIPINE (NORVASC) 10 MG TABLET    TAKE 1 TABLET EVERY DAY    LISINOPRIL (PRINIVIL;ZESTRIL) 10 MG TABLET    TAKE 1 TABLET EVERY DAY    SIMVASTATIN (ZOCOR) 5 MG TABLET    TAKE 1 TABLET  NIGHTLY       ALLERGIES     is allergic to asa [aspirin]. FAMILY HISTORY     indicated that his mother is . He indicated that his father is . He indicated that the status of his sister is unknown.    family history includes Cancer in his brother; Diabetes in his mother; Heart Disease in his brother; Stroke in his brother, father, and sister. SOCIAL HISTORY      reports that he has never smoked. He has never used smokeless tobacco. He reports that he does not drink alcohol or use drugs. PHYSICAL EXAM     INITIAL VITALS:  height is 6' (1.829 m) and weight is 180 lb (81.6 kg). His oral temperature is 97.9 °F (36.6 °C). His blood pressure is 144/88 (abnormal) and his pulse is 81. His respiration is 16 and oxygen saturation is 99%. Physical Exam   Constitutional: He is oriented to person, place, and time. He appears well-developed and well-nourished. HENT:   Head: Normocephalic and atraumatic. Right Ear: External ear normal.   Left Ear: External ear normal.   Mouth/Throat: Mucous membranes are dry. Eyes: Conjunctivae are normal. Right eye exhibits no discharge. Left eye exhibits no discharge. No scleral icterus. Neck: Normal range of motion. Neck supple. No JVD present. Cardiovascular: Normal rate. An irregular rhythm present. Patient's son reports that they are aware of pt's irregular heartbeat. Pulmonary/Chest: Effort normal and breath sounds normal. No stridor. No respiratory distress. He has no wheezes. Abdominal: Soft. He exhibits no distension. Bowel sounds are absent. There is generalized tenderness. Musculoskeletal: Normal range of motion. He exhibits no edema. Right lower leg: He exhibits no swelling and no edema. Left lower leg: He exhibits no swelling and no edema. Neurological: He is alert and oriented to person, place, and time. He exhibits normal muscle tone. GCS eye subscore is 4. GCS verbal subscore is 5. GCS motor subscore is 6. Skin: Skin is warm and dry. He is not diaphoretic. No erythema. Psychiatric: He has a normal mood and affect. His behavior is normal.   Nursing note and vitals reviewed. DIFFERENTIAL DIAGNOSIS:         DIAGNOSTIC RESULTS     EKG: All EKG's are interpreted by the Emergency Department Physician who either signs or Co-signs this chart in the absence of a cardiologist.  EKG interpreted by Kassandra Delvalle MD:    EKG read and interpreted by myself with comparison to 09/20/16 gives impression of normal sinus rhythm with heart rate of 88; interval 176; QRS 84;QTc 462; axis 95 1 40.        RADIOLOGY: non-plain film images(s) such as CT, Ultrasound and MRI are read by the radiologist.    CT Head WO Contrast    (Results Pending)   CT ABDOMEN PELVIS W IV CONTRAST Additional Contrast? Radiologist Recommendation    (Results Pending)   XR CHEST STANDARD (2 VW)    (Results Pending)       LABS:   Labs Reviewed   CBC WITH AUTO DIFFERENTIAL   BASIC METABOLIC PANEL   HEPATIC FUNCTION PANEL   LIPASE   AMYLASE   TROPONIN   LACTIC ACID, PLASMA   URINE RT REFLEX TO CULTURE   TROPONIN   POCT GLUCOSE       EMERGENCY DEPARTMENT COURSE:   Vitals:    Vitals:    07/14/18 0819   BP: (!) 144/88   Pulse: 81   Resp: 16   Temp: 97.9 °F (36.6 °C)   TempSrc: Oral   SpO2: 99%   Weight: 180 lb (81.6 kg)   Height: 6' (1.829 m)       8:45 AM: The patient was seen and evaluated. MDM:  Patient clinically looked dehydrated was started and IV fluids. Patient pain was controlled with morphine and fentanyl and patient also was provided with Zofran. Patient started feeling somewhat better. CT scan of abdomen and pelvis was done which did showed some gallbladder stone and also constipation. Ultrasound of the gallbladder confirmed the stone. Patient does have some leukocytosis. I discussed this with Dr. Godwin Berrios who is the surgeon on call and he advised that patient can be admitted under his name. All of patient's lab the testing that reviewed discussed with the patient, his wife and the son in the room and also with admitting surgeon. Patient's lactic acid was slightly elevated, amylase, lipase and liver function tests were essentially normal except patient's alkaline phosphatase, which has been chronically elevated  CRITICAL CARE:   none     CONSULTS:  Dr. Jeremiah Gray:  none     FINAL IMPRESSION    No diagnosis found. DISPOSITION/PLAN   *Admit    PATIENT REFERRED TO:  No follow-up provider specified. DISCHARGE MEDICATIONS:  New Prescriptions    No medications on file       (Please note that portions of this note were completed with a voice recognition program.  Efforts were made to edit the dictations but occasionally words are mis-transcribed.)    Scribe:  Madai Rolle 7/14/18 8:45 AM Scribing for and in the presence of Jazmin Bond MD.    Signed by: Scooter Aguirre, 07/14/18 9:09 AM    Provider:  I personally performed the services described in the documentation, reviewed and edited the documentation which was dictated to the scribe in my presence, and it accurately records my words and actions.     Jazmin Bond MD 7/14/18 9:09 AM                          Jazmin Bond MD  07/14/18 1454       Jazmin Bond MD  07/20/18 [address] [place]

## 2018-07-14 NOTE — ED NOTES
Patient resting on cart with complaint of abdominal pain and headache. Dr. Ynes Sykes at bedside to discuss plan of care with patient. Family at bedside. Respirations unlabored. Call light in reach. Side rails up times 2.      Anne Alcantara RN  07/14/18 [id number]

## 2018-07-14 NOTE — ED NOTES
Patient resting on cart with complaint of abdominal pain and headache. Patient denies nausea. Family at bedside. Respirations regular and unlabored. Side rails up times 2. Call light in reach.      Audrey Thomas RN  07/14/18 [id number]

## 2018-07-14 NOTE — FLOWSHEET NOTE
07/14/18 1900   Provider Notification   Reason for Communication Review case   Provider Name Dr Jeannie Smith   Provider Notification Physician   Method of Communication Call   Response See orders   Notification Time 1900   Clarification on IV fluid order.  New order to change IV fluids to 75 ml/ hour

## 2018-07-15 PROBLEM — N18.30 STAGE 3 CHRONIC KIDNEY DISEASE (HCC): Status: ACTIVE | Noted: 2018-07-15

## 2018-07-15 PROBLEM — K40.90 RIGHT INGUINAL HERNIA: Status: ACTIVE | Noted: 2018-07-15

## 2018-07-15 LAB
ALBUMIN SERPL-MCNC: 3.3 G/DL (ref 3.5–5.1)
ALP BLD-CCNC: 177 U/L (ref 38–126)
ALT SERPL-CCNC: 15 U/L (ref 11–66)
ANION GAP SERPL CALCULATED.3IONS-SCNC: 13 MEQ/L (ref 8–16)
AST SERPL-CCNC: 24 U/L (ref 5–40)
BACTERIA: ABNORMAL /HPF
BASOPHILS # BLD: 0.2 %
BASOPHILS ABSOLUTE: 0 THOU/MM3 (ref 0–0.1)
BILIRUB SERPL-MCNC: 2 MG/DL (ref 0.3–1.2)
BILIRUBIN URINE: NEGATIVE
BLOOD, URINE: ABNORMAL
BUN BLDV-MCNC: 12 MG/DL (ref 7–22)
CALCIUM SERPL-MCNC: 8.5 MG/DL (ref 8.5–10.5)
CASTS 2: ABNORMAL /LPF
CASTS UA: ABNORMAL /LPF
CHARACTER, URINE: CLEAR
CHLORIDE BLD-SCNC: 100 MEQ/L (ref 98–111)
CO2: 21 MEQ/L (ref 23–33)
COLOR: YELLOW
CREAT SERPL-MCNC: 1.3 MG/DL (ref 0.4–1.2)
CRYSTALS, UA: ABNORMAL
EOSINOPHIL # BLD: 0.2 %
EOSINOPHILS ABSOLUTE: 0 THOU/MM3 (ref 0–0.4)
EPITHELIAL CELLS, UA: ABNORMAL /HPF
ERYTHROCYTE [DISTWIDTH] IN BLOOD BY AUTOMATED COUNT: 13 % (ref 11.5–14.5)
ERYTHROCYTE [DISTWIDTH] IN BLOOD BY AUTOMATED COUNT: 43.3 FL (ref 35–45)
GFR SERPL CREATININE-BSD FRML MDRD: 52 ML/MIN/1.73M2
GLUCOSE BLD-MCNC: 141 MG/DL (ref 70–108)
GLUCOSE URINE: NEGATIVE MG/DL
HCT VFR BLD CALC: 37.1 % (ref 42–52)
HEMOGLOBIN: 12.4 GM/DL (ref 14–18)
IMMATURE GRANS (ABS): 0.05 THOU/MM3 (ref 0–0.07)
IMMATURE GRANULOCYTES: 0.5 %
INR BLD: 1.18 (ref 0.85–1.13)
KETONES, URINE: NEGATIVE
LEUKOCYTE ESTERASE, URINE: NEGATIVE
LYMPHOCYTES # BLD: 7.7 %
LYMPHOCYTES ABSOLUTE: 0.7 THOU/MM3 (ref 1–4.8)
MAGNESIUM: 1.6 MG/DL (ref 1.6–2.4)
MCH RBC QN AUTO: 30.3 PG (ref 26–33)
MCHC RBC AUTO-ENTMCNC: 33.4 GM/DL (ref 32.2–35.5)
MCV RBC AUTO: 90.7 FL (ref 80–94)
MISCELLANEOUS 2: ABNORMAL
MONOCYTES # BLD: 10.7 %
MONOCYTES ABSOLUTE: 1 THOU/MM3 (ref 0.4–1.3)
NITRITE, URINE: NEGATIVE
NUCLEATED RED BLOOD CELLS: 0 /100 WBC
PH UA: 8
PLATELET # BLD: 156 THOU/MM3 (ref 130–400)
PMV BLD AUTO: 9.9 FL (ref 9.4–12.4)
POTASSIUM SERPL-SCNC: 3.8 MEQ/L (ref 3.5–5.2)
PROTEIN UA: NEGATIVE
RBC # BLD: 4.09 MILL/MM3 (ref 4.7–6.1)
RBC URINE: ABNORMAL /HPF
RENAL EPITHELIAL, UA: ABNORMAL
SEG NEUTROPHILS: 80.7 %
SEGMENTED NEUTROPHILS ABSOLUTE COUNT: 7.5 THOU/MM3 (ref 1.8–7.7)
SODIUM BLD-SCNC: 134 MEQ/L (ref 135–145)
SPECIFIC GRAVITY, URINE: 1.01 (ref 1–1.03)
TOTAL PROTEIN: 6.7 G/DL (ref 6.1–8)
TROPONIN T: < 0.01 NG/ML
TROPONIN T: < 0.01 NG/ML
UROBILINOGEN, URINE: 1 EU/DL
WBC # BLD: 9.3 THOU/MM3 (ref 4.8–10.8)
WBC UA: ABNORMAL /HPF
YEAST: ABNORMAL

## 2018-07-15 PROCEDURE — 2580000003 HC RX 258: Performed by: SURGERY

## 2018-07-15 PROCEDURE — 84484 ASSAY OF TROPONIN QUANT: CPT

## 2018-07-15 PROCEDURE — 6360000002 HC RX W HCPCS: Performed by: SURGERY

## 2018-07-15 PROCEDURE — 83735 ASSAY OF MAGNESIUM: CPT

## 2018-07-15 PROCEDURE — 99233 SBSQ HOSP IP/OBS HIGH 50: CPT | Performed by: FAMILY MEDICINE

## 2018-07-15 PROCEDURE — 80053 COMPREHEN METABOLIC PANEL: CPT

## 2018-07-15 PROCEDURE — 6370000000 HC RX 637 (ALT 250 FOR IP): Performed by: FAMILY MEDICINE

## 2018-07-15 PROCEDURE — 6370000000 HC RX 637 (ALT 250 FOR IP): Performed by: SURGERY

## 2018-07-15 PROCEDURE — 85025 COMPLETE CBC W/AUTO DIFF WBC: CPT

## 2018-07-15 PROCEDURE — 99231 SBSQ HOSP IP/OBS SF/LOW 25: CPT | Performed by: FAMILY MEDICINE

## 2018-07-15 PROCEDURE — 1200000003 HC TELEMETRY R&B

## 2018-07-15 PROCEDURE — 99223 1ST HOSP IP/OBS HIGH 75: CPT | Performed by: INTERNAL MEDICINE

## 2018-07-15 PROCEDURE — 85610 PROTHROMBIN TIME: CPT

## 2018-07-15 PROCEDURE — 2709999900 HC NON-CHARGEABLE SUPPLY

## 2018-07-15 PROCEDURE — 36415 COLL VENOUS BLD VENIPUNCTURE: CPT

## 2018-07-15 PROCEDURE — 99232 SBSQ HOSP IP/OBS MODERATE 35: CPT | Performed by: SURGERY

## 2018-07-15 PROCEDURE — 81001 URINALYSIS AUTO W/SCOPE: CPT

## 2018-07-15 RX ORDER — POLYETHYLENE GLYCOL 3350 17 G/17G
17 POWDER, FOR SOLUTION ORAL DAILY PRN
Status: DISCONTINUED | OUTPATIENT
Start: 2018-07-15 | End: 2018-07-18 | Stop reason: HOSPADM

## 2018-07-15 RX ORDER — DOCUSATE SODIUM 100 MG/1
100 CAPSULE, LIQUID FILLED ORAL 2 TIMES DAILY
Status: DISCONTINUED | OUTPATIENT
Start: 2018-07-15 | End: 2018-07-18 | Stop reason: HOSPADM

## 2018-07-15 RX ADMIN — ACETAMINOPHEN 650 MG: 325 TABLET ORAL at 15:44

## 2018-07-15 RX ADMIN — CIPROFLOXACIN 400 MG: 2 INJECTION, SOLUTION INTRAVENOUS at 06:10

## 2018-07-15 RX ADMIN — LISINOPRIL 10 MG: 10 TABLET ORAL at 09:17

## 2018-07-15 RX ADMIN — SIMVASTATIN 5 MG: 10 TABLET, FILM COATED ORAL at 21:57

## 2018-07-15 RX ADMIN — AMLODIPINE BESYLATE 10 MG: 10 TABLET ORAL at 09:17

## 2018-07-15 RX ADMIN — DOCUSATE SODIUM 100 MG: 100 CAPSULE, LIQUID FILLED ORAL at 22:00

## 2018-07-15 RX ADMIN — CIPROFLOXACIN 400 MG: 2 INJECTION, SOLUTION INTRAVENOUS at 18:30

## 2018-07-15 RX ADMIN — SODIUM CHLORIDE: 9 INJECTION, SOLUTION INTRAVENOUS at 02:54

## 2018-07-15 RX ADMIN — ACETAMINOPHEN 650 MG: 325 TABLET ORAL at 00:11

## 2018-07-15 ASSESSMENT — PAIN DESCRIPTION - PAIN TYPE
TYPE: ACUTE PAIN

## 2018-07-15 ASSESSMENT — PAIN DESCRIPTION - LOCATION
LOCATION: ABDOMEN

## 2018-07-15 ASSESSMENT — PAIN SCALES - GENERAL
PAINLEVEL_OUTOF10: 3
PAINLEVEL_OUTOF10: 2
PAINLEVEL_OUTOF10: 3
PAINLEVEL_OUTOF10: 2
PAINLEVEL_OUTOF10: 2
PAINLEVEL_OUTOF10: 3

## 2018-07-15 ASSESSMENT — PAIN DESCRIPTION - ORIENTATION: ORIENTATION: RIGHT;LEFT;ANTERIOR

## 2018-07-15 ASSESSMENT — PAIN DESCRIPTION - DESCRIPTORS: DESCRIPTORS: ACHING;DISCOMFORT

## 2018-07-15 NOTE — CONSULTS
Consult    Patient:  Verónica Hills  YOB: 1933  Date of Service: 7/15/2018  MRN: [id number]   Acct:  [de-identified]   Primary Care Physician: Andrei Murray MD        History of Present Illness:   History obtained from chart review and the patient. The patient is a 80 y.o. male with HTN, HLD, PACs, Paget's disease whom I have been requested by Dr Richard Borrero to see for pre op evaluation. Patient presented to the ED with complaint of abdominal pain, nausea and emesis. CT abdomen pelvis showed Large non incarcerated right inguinal hernia which contains a few bowel loops and  milk of calcium bile in the gallbladder possibly a few tiny gallstones. Ultrasound RUQ showed an irregular hypodense masslike lesion within the gallbladder measuring 3.6 x 1.9 x 2.9 cm which most likely represents tumefactive sludge. Patient currently denies abdominal pain and states pain control is adequate. Patient denies fever, chills, chest pain, shortness of breath, dizziness, headache, cough or dysuria          Past Medical History:        Diagnosis Date    Hyperlipidemia     Hypertension     Osteoarthritis     PAC (premature atrial contraction)     Paget disease of bone        Past Surgical History:        Procedure Laterality Date    JOINT REPLACEMENT Bilateral 2013    HIP       Home Medications:   No current facility-administered medications on file prior to encounter. Current Outpatient Prescriptions on File Prior to Encounter   Medication Sig Dispense Refill    amLODIPine (NORVASC) 10 MG tablet TAKE 1 TABLET EVERY DAY 90 tablet 0    lisinopril (PRINIVIL;ZESTRIL) 10 MG tablet TAKE 1 TABLET EVERY DAY 90 tablet 0    simvastatin (ZOCOR) 5 MG tablet TAKE 1 TABLET  NIGHTLY 90 tablet 0       Allergies:  Asa [aspirin]    Social History:    reports that he has never smoked. He has never used smokeless tobacco. He reports that he does not drink alcohol or use drugs.     Family History:   Family History   Problem movement disorder noted  Musculoskeletal -  no joint tenderness, deformity or swelling  Extremities -  peripheral pulses normal, no pedal edema, no clubbing or cyanosis  Skin -  normal coloration and turgor, no rashes, no suspicious skin lesions noted      Review of Labs and Diagnostic Testing:    Recent Results (from the past 24 hour(s))   EKG Emergency    Collection Time: 07/14/18  8:26 AM   Result Value Ref Range    Ventricular Rate 88 BPM    Atrial Rate 88 BPM    P-R Interval 176 ms    QRS Duration 84 ms    Q-T Interval 382 ms    QTc Calculation (Bazett) 462 ms    P Axis 95 degrees    R Axis 1 degrees    T Axis 40 degrees   POCT glucose    Collection Time: 07/14/18  9:10 AM   Result Value Ref Range    Glucose 152 mg/dL    QC OK? y    POCT Glucose    Collection Time: 07/14/18  9:10 AM   Result Value Ref Range    POC Glucose 152 (H) 70 - 108 mg/dl   CBC auto differential    Collection Time: 07/14/18  9:21 AM   Result Value Ref Range    WBC 11.4 (H) 4.8 - 10.8 thou/mm3    RBC 4.64 (L) 4.70 - 6.10 mill/mm3    Hemoglobin 14.2 14.0 - 18.0 gm/dl    Hematocrit 41.6 (L) 42.0 - 52.0 %    MCV 89.7 80.0 - 94.0 fL    MCH 30.6 26.0 - 33.0 pg    MCHC 34.1 32.2 - 35.5 gm/dl    RDW-CV 12.9 11.5 - 14.5 %    RDW-SD 41.9 35.0 - 45.0 fL    Platelets 885 411 - 781 thou/mm3    MPV 9.5 9.4 - 12.4 fL    Seg Neutrophils 90.8 %    Lymphocytes 4.3 %    Monocytes 4.5 %    Eosinophils 0.0 %    Basophils 0.1 %    Immature Granulocytes 0.3 %    Platelet Estimate ADEQUATE Adequate    Segs Absolute 10.4 (H) 1.8 - 7.7 thou/mm3    Lymphocytes # 0.5 (L) 1.0 - 4.8 thou/mm3    Monocytes # 0.5 0.4 - 1.3 thou/mm3    Eosinophils # 0.0 0.0 - 0.4 thou/mm3    Basophils # 0.0 0.0 - 0.1 thou/mm3    Immature Grans (Abs) 0.04 0.00 - 0.07 thou/mm3    nRBC 0 /100 wbc   Basic Metabolic Panel    Collection Time: 07/14/18  9:21 AM   Result Value Ref Range    Sodium 137 135 - 145 meq/L    Potassium 3.6 3.5 - 5.2 meq/L    Chloride 99 98 - 111 meq/L    CO2 21 (L) 23 - 33 meq/L    Glucose 149 (H) 70 - 108 mg/dL    BUN 10 7 - 22 mg/dL    CREATININE 1.5 (H) 0.4 - 1.2 mg/dL    Calcium 8.9 8.5 - 10.5 mg/dL   Hepatic function panel    Collection Time: 07/14/18  9:21 AM   Result Value Ref Range    Alb 4.0 3.5 - 5.1 g/dL    Total Bilirubin 0.6 0.3 - 1.2 mg/dL    Bilirubin, Direct <0.2 0.0 - 0.3 mg/dL    Alkaline Phosphatase 201 (H) 38 - 126 U/L    AST 17 5 - 40 U/L    ALT 9 (L) 11 - 66 U/L    Total Protein 7.1 6.1 - 8.0 g/dL   Lipase    Collection Time: 07/14/18  9:21 AM   Result Value Ref Range    Lipase 26.6 5.6 - 51.3 U/L   Amylase    Collection Time: 07/14/18  9:21 AM   Result Value Ref Range    Amylase 68 20 - 104 U/L   Troponin    Collection Time: 07/14/18  9:21 AM   Result Value Ref Range    Troponin T < 0.010 ng/ml   Lactic acid, plasma    Collection Time: 07/14/18  9:21 AM   Result Value Ref Range    Lactic Acid 2.9 (H) 0.5 - 2.2 mmol/L   Sedimentation Rate    Collection Time: 07/14/18  9:21 AM   Result Value Ref Range    Sed Rate 28 (H) 0 - 10 mm/hr   Anion Gap    Collection Time: 07/14/18  9:21 AM   Result Value Ref Range    Anion Gap 17.0 (H) 8.0 - 16.0 meq/L   Osmolality    Collection Time: 07/14/18  9:21 AM   Result Value Ref Range    Osmolality Calc 275.7 275.0 - 300 mOsmol/kg   Glomerular Filtration Rate, Estimated    Collection Time: 07/14/18  9:21 AM   Result Value Ref Range    Est, Glom Filt Rate 44 (A) ml/min/1.73m2   Scan of Blood Smear    Collection Time: 07/14/18  9:21 AM   Result Value Ref Range    SCAN OF BLOOD SMEAR see below    Urine with Reflexed Micro    Collection Time: 07/14/18 11:05 AM   Result Value Ref Range    Glucose, Ur NEGATIVE NEGATIVE mg/dl    Bilirubin Urine NEGATIVE NEGATIVE    Ketones, Urine NEGATIVE NEGATIVE    Specific Gravity, Urine > 1.030 (A) 1.002 - 1.03    Blood, Urine TRACE (A) NEGATIVE    pH, UA 7.0 5.0 - 9.0    Protein, UA NEGATIVE NEGATIVE    Urobilinogen, Urine 1.0 0.0 - 1.0 eu/dl    Nitrite, Urine NEGATIVE NEGATIVE    Leukocyte NEGATIVE    Color, UA YELLOW STRAW-YELL    Character, Urine CLEAR CLEAR-SL C    RBC, UA 3-5 0-2/hpf /hpf    WBC, UA NONE SEEN 0-4/hpf /hpf    Epi Cells NONE SEEN 3-5/hpf /hpf    Bacteria, UA NONE FEW/NONE S /hpf    Casts UA NONE SEEN NONE SEEN /lpf    Crystals NONE SEEN NONE SEEN    Renal Epithelial, Urine NONE SEEN NONE SEEN    Yeast, UA NONE SEEN NONE SEEN    CASTS 2 NONE SEEN NONE SEEN /lpf    MISCELLANEOUS 2 NONE SEEN    CBC auto differential    Collection Time: 07/15/18  3:19 AM   Result Value Ref Range    WBC 9.3 4.8 - 10.8 thou/mm3    RBC 4.09 (L) 4.70 - 6.10 mill/mm3    Hemoglobin 12.4 (L) 14.0 - 18.0 gm/dl    Hematocrit 37.1 (L) 42.0 - 52.0 %    MCV 90.7 80.0 - 94.0 fL    MCH 30.3 26.0 - 33.0 pg    MCHC 33.4 32.2 - 35.5 gm/dl    RDW-CV 13.0 11.5 - 14.5 %    RDW-SD 43.3 35.0 - 45.0 fL    Platelets 735 358 - 716 thou/mm3    MPV 9.9 9.4 - 12.4 fL    Seg Neutrophils 80.7 %    Lymphocytes 7.7 %    Monocytes 10.7 %    Eosinophils 0.2 %    Basophils 0.2 %    Immature Granulocytes 0.5 %    Segs Absolute 7.5 1.8 - 7.7 thou/mm3    Lymphocytes # 0.7 (L) 1.0 - 4.8 thou/mm3    Monocytes # 1.0 0.4 - 1.3 thou/mm3    Eosinophils # 0.0 0.0 - 0.4 thou/mm3    Basophils # 0.0 0.0 - 0.1 thou/mm3    Immature Grans (Abs) 0.05 0.00 - 0.07 thou/mm3    nRBC 0 /100 wbc   Protime-INR    Collection Time: 07/15/18  3:19 AM   Result Value Ref Range    INR 1.18 (H) 0.85 - 1.13   Troponin    Collection Time: 07/15/18  3:19 AM   Result Value Ref Range    Troponin T < 0.010 ng/ml       Radiology:     Xr Chest Standard (2 Vw)    Result Date: 7/14/2018  PROCEDURE: XR CHEST (2 VW) CLINICAL INFORMATION: Abdominal pain. Headaches disease. Hypertension. COMPARISON: 1/27/2015 TECHNIQUE: PA and lateral views of the chest were obtained. Normal chest. No acute findings. **This report has been created using voice recognition software. It may contain minor errors which are inherent in voice recognition technology. ** Final report electronically signed by Dr. Alicia Nguyen on 7/14/2018 9:11 AM    Ct Head Wo Contrast    Result Date: 7/14/2018  PROCEDURE: NONCONTRAST CT BRAIN CLINICAL INFORMATION: Headache. Nausea. Abdominal pain. COMPARISON: 9/22/2016 TECHNIQUE: Multiple axial 5 mm images of the brain were obtained without administration of intravenous contrast material. ALL CT SCANS AT THIS FACILITY use dose modulation, iterative reconstruction, and/or weight-based dosing when appropriate to reduce radiation dose to as low as reasonably achievable. FINDINGS: Lateral, third, fourth ventricles: Moderately enlarged ventricles, consistent with central atrophy. Moderate diffuse cerebral cortical atrophy and mild cerebellar cortical atrophy are demonstrated, unchanged from prior study. Parenchyma:  No acute infarction, mass lesion, or intracranial hemorrhage is seen. Evidence for moderate small vessel disease in the periventricular white matter bilaterally, unchanged from prior study. Mastoid processes: Well aerated. Normal in appearance. .   Paranasal sinuses/calvarium: There is minimal mucosal thickening in the apex of the left maxillary sinus. There are a few small retention cysts in the right maxillary sinus. Suggestion of bilateral antral windows. Cannot exclude prior maxillary sinus surgery. Clinical correlation recommended. There is moderate engorgement of the inferior nasal turbinates bilaterally, suggesting possible rhinitis. Calvarium is unremarkable. No acute intracranial process. Chronic findings, discussed above. No change from prior study. **This report has been created using voice recognition software. It may contain minor errors which are inherent in voice recognition technology. ** Final report electronically signed by Dr. Alicia Nguyen on 7/14/2018 11:02 AM    Ct Abdomen Pelvis W Iv Contrast Additional Contrast? Radiologist Recommendation    Result Date: 7/14/2018  CT ABDOMEN AND PELVIS WITH CONTRAST ENHANCEMENT: CLINICAL INFORMATION: Abdominal pain COMPARISON: No prior study. TECHNIQUE: Multiple axial 5 mm images of the abdomen, pelvis, and lung bases were obtained following the administration of oral and intravenous contrast material (ISOVUE). Computer generated sagittal and coronal images of the abdomen and pelvis were also  reconstructed. ALL CT SCANS AT THIS FACILITY use dose modulation, iterative reconstruction, and/or weight-based dosing when appropriate to reduce radiation dose to as low as reasonably achievable. 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 No acute infiltrates or effusions are seen. Liver: Liver is relatively small in size. Cannot exclude cirrhosis. No liver lesions are identified. No acute finding involving the gallbladder, however, there is a small amount of radiodense material within the gallbladder suggestive of milk of calcium bile and possibly a few tiny gallstones. Pancreas, spleen and adrenal glands: Pancreas is atrophic in appearance. Mild splenomegaly. Small 13 mm nodule right adrenal gland. 2 adjacent 13 mm nodules left adrenal gland, likely representing adenomas. Kidneys:  Small benign-appearing 16 mm cyst right kidney. Kidneys otherwise unremarkable. No acute findings. No hydronephrosis. No renal calculi. Bowel/Peritoneum: No abnormally dilated bowel loops are seen. There are findings of constipation. There are a few scattered diverticuli in the colon but no evidence for diverticulitis. The region of the appendix is unremarkable. No free air. No free fluid in the abdomen. No abnormally enlarged para-aortic lymph nodes are seen. Bones : Mild scoliotic curvatures in the lumbar spine. Marked disc space narrowing and degenerative disc disease L3-4 level. Moderate disc space narrowing L2-3 and L4-5 levels. Mild degenerative disc disease L5-S1 level. Suggestion of a small herniated degenerated disc fragment L3-4 level posteriorly midline with no significant indentation of the thecal sac. Bilateral hip prostheses. Relatively coarsened trabecular pattern involving the left iliac bone, suggestive of Paget's disease. Pelvis: Urinary bladder unremarkable. Moderately enlarged prostate gland. Large right inguinal hernia contains a few bowel loops. Moderately enlarged prostate gland. 1. Large right inguinal hernia contains a few bowel loops. These are not incarcerated, however. No evidence for bowel obstruction. 2. Findings of constipation. Large prostate gland. Diverticulosis of the colon. No evidence for diverticulitis. 3. Findings suggestive of milk of calcium bile in the gallbladder possibly a few tiny gallstones. No acute findings involving the gallbladder. 4.. Fibroemphysematous lungs. Bilateral renal nodules. Liver relatively small in size. Cannot assess for cirrhosis. Mild splenomegaly. **This report has been created using voice recognition software. It may contain minor errors which are inherent in voice recognition technology. ** Final report electronically signed by Dr. Aissatou Zapata on 7/14/2018 11:11 AM    Us Gallbladder Ruq    Result Date: 7/14/2018  PROCEDURE: US GALLBLADDER RUQ CLINICAL INFORMATION: Cholelithiasis. COMPARISON: No prior study. TECHNIQUE: Routine US examination of the gallbladder. TECHNICAL DATA: Gallbladder - 8.66 x 4.59 x 4.56 cm Gallbladder Wall - 0.21 cm Common Duct - 0.61 cm FINDINGS: GALLBLADDER: 1. The gallbladder is mildly prominent. 2. The gallbladder wall thickness is normal. 3. There is an irregular hypodense masslike lesion within the gallbladder measuring 3.6 x 1.9 x 2.9 cm which most likely represents tumefactive sludge. There is no associated vascularity and a gallbladder mass is felt to be less likely although not entirely excluded. There are no mobile shadowing gallstones. 4. There is no pericholecystic fluid. 5. There is no sonographic Estrada's sign. COMMON DUCT: 1. The common duct is normal size. 2. There is no biliary dilatation. LIVER: 1.  Visualized

## 2018-07-15 NOTE — PROGRESS NOTES
monitor  12. Sinus arrhythmia with frequent PACs -- cont monitor tele and monitor lytes -- has hx since 2013 per chart review  -- has never had any cardiac w/u in past --> check echo and c/s cardio 7/14 as need for surgery 7/16 and unknown cardiac status with fair <4 METS functional status - ?need stress prior to surgery  -- ?add BB  13. Large right inguinal hernia -- per CT abd 7/14 - no incarceration -- per general surgery plan for repair with cholecystectomy 7/16  14. CKD stage III -- at baseline 1.3 on 7/15 -- was 1.5 on admission 7/14 and baseline in past 2 yrs is 1.3 - 1.6 -- cont IVF and monitor closely -- ?hold lisinopril  15. Essential HTN -- cont norvasc, lisinopril - monitor and adjust prn  16. Constipation -- add colace, prn miralax 7/15 and monitor shaw post-op for risk of ileus  17. Large prostate -- monitor urine output and for retention -- f/u urology outpt -- u/a (-) 7/15  18. Diverticulosis -- per CT abd 7/14 but no diverticulitis  19. ?fibroephysematous lungs -- noted per CT abd 7/14 -- no hx of smoking - monitor resp status closely post-op  20. Bilateral renal nodules -- ?need renal u/s - has CKD - ?f/u urology outpt  21. Mild splenomegaly -- noted per CT abd 7/14/18 -- WBC normal 7/15 and plts WNL -- f/u outpt monitoring  22. OA  23. ?paget disease -- pt uncertain of this dx  24. HLP -- statin  25. Unsteady gait -- pt states loses balance at times and has limited his mobility -- ct head 7/14/18 (-) acute process. C/s PT/OT  26. Pre-op risk assessment -- pt has functional status <4 METS and has unknown coronary status and has +arrhythmia with frequent PAC -- check echo and c/s cardio for ?need BB and ?stress prior to surgery needed as he is at least moderate risk for surgery. Pulmonary status pt's ct abd showed some fibroemphysematous changes thus could cause some issues post-op but no smoking hx - encourage IS.     DIspo  -- 7/15 --> c/s cardiology to assist with pre-op risk assessment with arrhythmia and unknown cardiac status, limited functional capacity - ?need BB -- cont monitor tele. Cont monitor and replace lytes. Cont monitor h/h. Cont IVF. Monitor BP. Monitor bowels and for urinary retention post-op with large prostate. Cardio to assist with further recs for surgery. Chief Complaint: abd pain    Hospital Course: Brittany Workman is a 80 y.o. male with HTN, CKD stage III, HLD, ? Paget's disease, hx of PSVT and frequent PACs per chart review admitted by general surgery Dr. Cherry Madden for abd pain with suspected acute cholecystitis. Hospitalist service consulted for arrhythmia and pre-op eval as Dr. Chamberlain Kidney for     Subjective:   -- 7/15/2018  --> pt states he's feeling little better. Still with Right sided abd pain/discomfort - comes and goes. Worse with eating. Denies cp, sob. States he doesn't have much activity anymore - feels unsteady a lot at home. Only moves about the house. Denies f/c. On RA. Tmax 100.1 last 24 hrs. Last BM ?? Medications:  Reviewed    Infusion Medications    sodium chloride 75 mL/hr at 07/15/18 0254     Scheduled Medications    potassium (CARDIAC) replacement protocol   Other RX Placeholder    magnesium replacement protocol   Other RX Placeholder    amLODIPine  10 mg Oral Daily    lisinopril  10 mg Oral Daily    simvastatin  5 mg Oral Nightly    sodium chloride flush  10 mL Intravenous 2 times per day    ciprofloxacin  400 mg Intravenous Q12H     PRN Meds: sodium chloride flush, acetaminophen, ondansetron, morphine, oxyCODONE-acetaminophen      Intake/Output Summary (Last 24 hours) at 07/15/18 0939  Last data filed at 07/15/18 2436   Gross per 24 hour   Intake          1910.85 ml   Output             1930 ml   Net           -19.15 ml       Diet:  DIET LOW FAT;   Diet NPO, After Midnight    Exam:  BP (!) 141/89   Pulse 94   Temp 98.5 °F (36.9 °C) (Oral)   Resp 18   Ht 6' (1.829 m)   Wt 189 lb 4.8 oz (85.9 kg)   SpO2 95%   BMI

## 2018-07-15 NOTE — CONSULTS
Spouse name: Blaze Collins    Number of children: 4    Years of education: 8     Occupational History    Not on file. Social History Main Topics    Smoking status: Never Smoker    Smokeless tobacco: Never Used    Alcohol use No    Drug use: No    Sexual activity: Not on file     Other Topics Concern    Not on file     Social History Narrative    No narrative on file     ROS:   Constitutional: Denies any recent wt change. Eyes:  Denies any blurring or double vision, no glaucoma  Ears/Nose/Mouth/Throat:  Denies any chronic sinus/rhinitis, bleeding gums  Cardiovascular:  As described above. Respiratory:  Denies any frequent cough, wheezing or coughing up blood  Genitourinary:  Denies difficulty with urination and kidney stones  Gastrointestinal:  Denies any chronic problems with abdominal pain, nausea, vomiting or diarrhea  Musculoskeletal:  Denies any joint pain, back pain, or difficulty walking  Integumentary:  Denies any rash  Neurological:  No numbness or tingling  Endocrine:  Denies any polydipsia. Hematologic/Lymphatic:  Denies any hemorrhage or lymphatic drainage problems. Labs:  CBC: Recent Labs      07/14/18   0921  07/15/18   0319   WBC  11.4*  9.3   HGB  14.2  12.4*   HCT  41.6*  37.1*   MCV  89.7  90.7   PLT  181  156     BMP: Recent Labs      07/14/18   0921  07/14/18   2032  07/15/18   0918   NA  137   --   134*   K  3.6   --   3.8   CL  99   --   100   CO2  21*   --   21*   PHOS   --   2.1*   --    BUN  10   --   12   CREATININE  1.5*   --   1.3*   MG   --   1.6  1.6     Accucheck Glucoses:   Recent Labs      07/14/18   0910   POCGLU  152*     Cardiac Enzymes: No results for input(s): CKTOTAL, CKMB, CKMBINDEX, TROPONINI in the last 72 hours. PT/INR:   Recent Labs      07/15/18   0319   INR  1.18*     APTT: No results for input(s): APTT in the last 72 hours.   Liver Profile:  Lab Results   Component Value Date    AST 24 07/15/2018    ALT 15 07/15/2018    BILIDIR <0.2 07/14/2018

## 2018-07-15 NOTE — PROGRESS NOTES
[id number] [facility]   Dr. Nikki Rebolledo  Daily Progress Note  Pt Name: Eri Ordoñez  Medical Record Number: [id number]  Date of Birth 1933   Today's Date: 7/15/2018    PAD#2    CHIEF COMPLAINT acute cholecystitis large right inguinal hernia    SUBJECTIVE  Patient feels better    OBJECTIVE  CURRENT VITALS BP (!) 141/89   Pulse 94   Temp 98.5 °F (36.9 °C) (Oral)   Resp 18   Ht 6' (1.829 m)   Wt 189 lb 4.8 oz (85.9 kg)   SpO2 95%   BMI 25.67 kg/m²   LUNGS: Lungs clear   ABDOMEN: Patient persists with positive Estrada sign right upper quadrant and also has relatively large reducible right inguinal hernia  WOUNDS: Not applicable. 24 HR INTAKE/OUTPUT :   Intake/Output Summary (Last 24 hours) at 07/15/18 1457  Last data filed at 07/15/18 1343   Gross per 24 hour   Intake          2986.85 ml   Output             2280 ml   Net           706.85 ml   I/O last 3 completed shifts: In: 1910.9 [P.O.:780; I.V.:1130.9]  Out:  [YVKK]  DRAIN/TUBE OUTPUT :      LABS  CBC :   Lab Results   Component Value Date    WBC 9.3 07/15/2018    HGB 12.4 07/15/2018    HCT 37.1 07/15/2018     07/15/2018     BMP: Lab Results   Component Value Date     07/15/2018    K 3.8 07/15/2018     07/15/2018    CO2 21 07/15/2018    BUN 12 07/15/2018    CREATININE 1.3 07/15/2018    MG 1.6 07/15/2018    PHOS 2.1 2018     PROCEDURE: US GALLBLADDER RUQ       CLINICAL INFORMATION: Cholelithiasis.       COMPARISON: No prior study.       TECHNIQUE: Routine US examination of the gallbladder.       TECHNICAL DATA:   Gallbladder - 8.66 x 4.59 x 4.56 cm   Gallbladder Wall - 0.21 cm   Common Duct - 0.61 cm           FINDINGS:    GALLBLADDER:   1. The gallbladder is mildly prominent. 2. The gallbladder wall thickness is normal.   3. There is an irregular hypodense masslike lesion within the gallbladder measuring 3.6 x 1.9 x 2.9 cm which most likely represents tumefactive sludge.  There is no associated vascularity gallstones. No acute findings involving the gallbladder. 4.. Fibroemphysematous lungs. Bilateral renal nodules. Liver relatively small in size. Cannot assess for cirrhosis. Mild splenomegaly.               **This report has been created using voice recognition software.  It may contain minor errors which are inherent in voice recognition technology. **       Final report electronically signed by Dr. Moira Traore on 7/14/2018 11:11 AM             ASSESSMENT  1. 1.Large amount of sludge in the gallbladder with exam consistent with acute cholecystitis although ultrasound and CT scan did not confirm acute cholecystitis. 2. Large reducible right inguinal hernia discussed with the patient my recommendation for cholecystectomy and right inguinal hernia repair due to patient's sinus arrhythmia although his troponins are normal last hospitalist to review and clear for [id number]      PLAN  1.   Plan laparoscopic cholecystectomy and right inguinal hernia repair pending medical clearance      Aiyana Stauffer  Electronically signed 7/15/2018 at 2:57 PM

## 2018-07-15 NOTE — H&P
[address] [place] [address]                         PREOPERATIVE HISTORY AND PHYSICAL    PATIENT NAME: Saulo Palafox                    :        1933  MED REC NO:   [id number]                           ROOM:       0002  ACCOUNT NO:   [de-identified]                           ADMIT DATE: 2018  PROVIDER:     Jess Valencia. Jenelle Dickson MD    CHIEF COMPLAINT:  Abdominal pain, possible mild acute cholecystitis with  right inguinal hernia. HISTORY OF PRESENT ILLNESS:  The patient is an 25-year-old white male being  examined with this two sons and wife present, who is somewhat of a vague  historian but apparently has had a 2-week history of mid abdominal pain. He is somewhat vague about the characterization of it, but has been  persistent and it was worse over the last 24 hours and he elected to  present to the emergency room for evaluation. He was noted to have some  mild guarding but no peritoneal irritation and underwent a CT scan of the  abdomen and pelvis which has revealed what appears to be small stones and  fair amount of sludge in the gallbladder, and also had an ultrasound  performed showing a large _____ mass that is felt to be sludge. He also  was noted to have a right inguinal hernia for which he states he had no  idea he had, and he has been admitted for further evaluation as he was  fairly uncomfortable in the emergency room with a mild white blood cell  count elevation. He has never had a colonoscopy. He denies and has had  about 10-pound weight loss over the last few weeks due to inability to eat. PAST MEDICAL HISTORY:  Positive for hypertension, hyperlipidemia, history  of PACs and osteoarthritis. He also has a history of Paget disease of the  bone. PAST SURGICAL HISTORY:  No abdominal surgeries. Has had bilateral joint  replacement. MEDICATIONS:  Norvasc, Zestril and simvastatin.     ALLERGIES: ASPIRIN. FAMILY HISTORY:  Positive for coronary artery disease, diabetes, cancer,  peripheral vascular disease. SOCIAL HISTORY:  He has never smoked. He denies any use of alcohol or  certainly drugs. REVIEW OF SYSTEMS:  A 10-point review of systems is otherwise negative  except as mentioned above. PHYSICAL EXAMINATION:  GENERAL:  The patient is an 35-year-old white male. He is resting in his  hospital bed. He appears in no distress. HEENT:  Head, ears, eyes, nose, and Throat:  Pupils are equal.  EOMs are  intact. Sclerae are clear. TMJs are normal.  External auditory canal is  normal.  NECK:  Soft. There is no palpable masses. Thyroid is not enlarged. CARDIAC:  S1, S2. No extra sounds, but he clearly has an irregular heart  rate. No murmurs could be heard. CHEST:  Respirations are equal bilaterally. ABDOMEN:  Nondistended. Bowel sounds are present. He has some vague  tenderness around the umbilicus but he has what appears to be a positive  Estrada sign with right upper quadrant pain to palpation. There are no  palpable masses. GENITALIA:  Groins show femoral pulses present. He has a large right  inguinal hernia that is reducible and nontender. EXTREMITIES:  Upper extremities, he has good radial pulses again with  irregular pulse rate. Long bones are normal.  Good range of motion with  good  strength. Lower extremities show no edema. He has good range of  motion of the feet with DP pulses that are 2+. LABORATORY DATA:  Revealed a sodium of 137, potassium of 3.6, BUN of 10,  creatinine of 1.5, glucose was 149, his calcium was 8.9. Troponin was less  than 0.10. Liver function test revealed an alk phos of 201, amylase of 68,  bilirubin was 0.6. The patient had a white blood cell count of 11.4 with a  hemoglobin of 14.2. IMAGING STUDIES:  A CT scan of the head was ordered and showed no acute  process, although some chronic changes.   CT scan of the abdomen and pelvis  was read as a large right inguinal hernia containing a few _____ bowels,  not incarcerated, findings of constipation. He does have a large prostate  gland with diverticulosis. No diverticulitis. He is felt to have milk of  calcium bile in the gallbladder with few tiny gallstones, and then an  ultrasound of the gallbladder was read as an irregular hypodense mass-like  lesion in the gallbladder 3.6 x 1.9 x 2.9 cm, which most likely represents  tumefactive sludge, mass is felt to be less likely. ASSESSMENT/PLAN:  1. Exam consistent with mild acute cholecystitis with sludge in the  gallbladder. No evidence of acute cholecystitis, but clinically he does  have that. 2.  Reducible large right inguinal hernia. 3.  Hypertension. 4.  Hyperlipidemia. 5.  Premature atrial sinus arrhythmias. We discussed gallbladder disease with the family and the patient and I feel  he is probably going to benefit from a laparoscopic cholecystectomy. We  will begin antibiotics. We will ask Cardiology to see. We will consider a  lap cholecystectomy and possible right inguinal hernia repair on Monday. LUKE ARIAS [facility], MD    D: 07/14/2018 19:58:04       T: 07/14/2018 20:00:05     [email or web address]  Job#: [id number]     Doc#: [id number]    CC:

## 2018-07-16 ENCOUNTER — APPOINTMENT (OUTPATIENT)
Dept: NON INVASIVE DIAGNOSTICS | Age: 83
DRG: 418 | End: 2018-07-16
Payer: MEDICARE

## 2018-07-16 ENCOUNTER — ANESTHESIA EVENT (OUTPATIENT)
Dept: OPERATING ROOM | Age: 83
DRG: 418 | End: 2018-07-16
Payer: MEDICARE

## 2018-07-16 ENCOUNTER — ANESTHESIA (OUTPATIENT)
Dept: OPERATING ROOM | Age: 83
DRG: 418 | End: 2018-07-16
Payer: MEDICARE

## 2018-07-16 VITALS
OXYGEN SATURATION: 100 % | SYSTOLIC BLOOD PRESSURE: 126 MMHG | RESPIRATION RATE: 11 BRPM | DIASTOLIC BLOOD PRESSURE: 75 MMHG | TEMPERATURE: 98.6 F

## 2018-07-16 LAB
ANION GAP SERPL CALCULATED.3IONS-SCNC: 13 MEQ/L (ref 8–16)
BASOPHILS # BLD: 0.2 %
BASOPHILS ABSOLUTE: 0 THOU/MM3 (ref 0–0.1)
BUN BLDV-MCNC: 14 MG/DL (ref 7–22)
CALCIUM SERPL-MCNC: 8.5 MG/DL (ref 8.5–10.5)
CHLORIDE BLD-SCNC: 102 MEQ/L (ref 98–111)
CO2: 20 MEQ/L (ref 23–33)
CREAT SERPL-MCNC: 1.3 MG/DL (ref 0.4–1.2)
EOSINOPHIL # BLD: 0.5 %
EOSINOPHILS ABSOLUTE: 0.1 THOU/MM3 (ref 0–0.4)
ERYTHROCYTE [DISTWIDTH] IN BLOOD BY AUTOMATED COUNT: 13.2 % (ref 11.5–14.5)
ERYTHROCYTE [DISTWIDTH] IN BLOOD BY AUTOMATED COUNT: 43.3 FL (ref 35–45)
GFR SERPL CREATININE-BSD FRML MDRD: 52 ML/MIN/1.73M2
GLUCOSE BLD-MCNC: 110 MG/DL (ref 70–108)
HCT VFR BLD CALC: 37.5 % (ref 42–52)
HEMOGLOBIN: 12.7 GM/DL (ref 14–18)
IMMATURE GRANS (ABS): 0.08 THOU/MM3 (ref 0–0.07)
IMMATURE GRANULOCYTES: 0.7 %
LV EF: 58 %
LVEF MODALITY: NORMAL
LYMPHOCYTES # BLD: 5.9 %
LYMPHOCYTES ABSOLUTE: 0.6 THOU/MM3 (ref 1–4.8)
MAGNESIUM: 1.6 MG/DL (ref 1.6–2.4)
MCH RBC QN AUTO: 30.5 PG (ref 26–33)
MCHC RBC AUTO-ENTMCNC: 33.9 GM/DL (ref 32.2–35.5)
MCV RBC AUTO: 90.1 FL (ref 80–94)
MONOCYTES # BLD: 8.7 %
MONOCYTES ABSOLUTE: 1 THOU/MM3 (ref 0.4–1.3)
NUCLEATED RED BLOOD CELLS: 0 /100 WBC
PHOSPHORUS: 2.4 MG/DL (ref 2.4–4.7)
PLATELET # BLD: 143 THOU/MM3 (ref 130–400)
PMV BLD AUTO: 10.3 FL (ref 9.4–12.4)
POTASSIUM SERPL-SCNC: 4.1 MEQ/L (ref 3.5–5.2)
RBC # BLD: 4.16 MILL/MM3 (ref 4.7–6.1)
SEG NEUTROPHILS: 84 %
SEGMENTED NEUTROPHILS ABSOLUTE COUNT: 9.2 THOU/MM3 (ref 1.8–7.7)
SODIUM BLD-SCNC: 135 MEQ/L (ref 135–145)
WBC # BLD: 11 THOU/MM3 (ref 4.8–10.8)

## 2018-07-16 PROCEDURE — 47562 LAPAROSCOPIC CHOLECYSTECTOMY: CPT | Performed by: SURGERY

## 2018-07-16 PROCEDURE — 97535 SELF CARE MNGMENT TRAINING: CPT

## 2018-07-16 PROCEDURE — G8988 SELF CARE GOAL STATUS: HCPCS

## 2018-07-16 PROCEDURE — 2709999900 HC NON-CHARGEABLE SUPPLY

## 2018-07-16 PROCEDURE — 3430000000 HC RX DIAGNOSTIC RADIOPHARMACEUTICAL: Performed by: SURGERY

## 2018-07-16 PROCEDURE — 97162 PT EVAL MOD COMPLEX 30 MIN: CPT

## 2018-07-16 PROCEDURE — 2709999900 HC NON-CHARGEABLE SUPPLY: Performed by: SURGERY

## 2018-07-16 PROCEDURE — 2580000003 HC RX 258: Performed by: NURSE ANESTHETIST, CERTIFIED REGISTERED

## 2018-07-16 PROCEDURE — 7100000001 HC PACU RECOVERY - ADDTL 15 MIN: Performed by: SURGERY

## 2018-07-16 PROCEDURE — 6360000002 HC RX W HCPCS: Performed by: SURGERY

## 2018-07-16 PROCEDURE — 3600000013 HC SURGERY LEVEL 3 ADDTL 15MIN: Performed by: SURGERY

## 2018-07-16 PROCEDURE — C1773 RET DEV, INSERTABLE: HCPCS | Performed by: SURGERY

## 2018-07-16 PROCEDURE — 7100000000 HC PACU RECOVERY - FIRST 15 MIN: Performed by: SURGERY

## 2018-07-16 PROCEDURE — 80048 BASIC METABOLIC PNL TOTAL CA: CPT

## 2018-07-16 PROCEDURE — 84100 ASSAY OF PHOSPHORUS: CPT

## 2018-07-16 PROCEDURE — 99232 SBSQ HOSP IP/OBS MODERATE 35: CPT | Performed by: FAMILY MEDICINE

## 2018-07-16 PROCEDURE — 6360000002 HC RX W HCPCS

## 2018-07-16 PROCEDURE — 88304 TISSUE EXAM BY PATHOLOGIST: CPT

## 2018-07-16 PROCEDURE — 49505 PRP I/HERN INIT REDUC >5 YR: CPT | Performed by: SURGERY

## 2018-07-16 PROCEDURE — 85025 COMPLETE CBC W/AUTO DIFF WBC: CPT

## 2018-07-16 PROCEDURE — 36415 COLL VENOUS BLD VENIPUNCTURE: CPT

## 2018-07-16 PROCEDURE — 93306 TTE W/DOPPLER COMPLETE: CPT

## 2018-07-16 PROCEDURE — 2580000003 HC RX 258: Performed by: SURGERY

## 2018-07-16 PROCEDURE — 93017 CV STRESS TEST TRACING ONLY: CPT

## 2018-07-16 PROCEDURE — 2500000003 HC RX 250 WO HCPCS: Performed by: SURGERY

## 2018-07-16 PROCEDURE — C1781 MESH (IMPLANTABLE): HCPCS | Performed by: SURGERY

## 2018-07-16 PROCEDURE — G8978 MOBILITY CURRENT STATUS: HCPCS

## 2018-07-16 PROCEDURE — 97166 OT EVAL MOD COMPLEX 45 MIN: CPT

## 2018-07-16 PROCEDURE — 78452 HT MUSCLE IMAGE SPECT MULT: CPT

## 2018-07-16 PROCEDURE — 3700000000 HC ANESTHESIA ATTENDED CARE: Performed by: SURGERY

## 2018-07-16 PROCEDURE — 6360000002 HC RX W HCPCS: Performed by: ANESTHESIOLOGY

## 2018-07-16 PROCEDURE — G8987 SELF CARE CURRENT STATUS: HCPCS

## 2018-07-16 PROCEDURE — 6360000002 HC RX W HCPCS: Performed by: NURSE ANESTHETIST, CERTIFIED REGISTERED

## 2018-07-16 PROCEDURE — 2500000003 HC RX 250 WO HCPCS: Performed by: NURSE ANESTHETIST, CERTIFIED REGISTERED

## 2018-07-16 PROCEDURE — G8979 MOBILITY GOAL STATUS: HCPCS

## 2018-07-16 PROCEDURE — 99231 SBSQ HOSP IP/OBS SF/LOW 25: CPT | Performed by: NURSE PRACTITIONER

## 2018-07-16 PROCEDURE — 2780000010 HC IMPLANT OTHER: Performed by: SURGERY

## 2018-07-16 PROCEDURE — 83735 ASSAY OF MAGNESIUM: CPT

## 2018-07-16 PROCEDURE — 1200000003 HC TELEMETRY R&B

## 2018-07-16 PROCEDURE — 3600000003 HC SURGERY LEVEL 3 BASE: Performed by: SURGERY

## 2018-07-16 PROCEDURE — A9500 TC99M SESTAMIBI: HCPCS | Performed by: SURGERY

## 2018-07-16 PROCEDURE — 97110 THERAPEUTIC EXERCISES: CPT

## 2018-07-16 PROCEDURE — 6370000000 HC RX 637 (ALT 250 FOR IP): Performed by: SURGERY

## 2018-07-16 PROCEDURE — 3700000001 HC ADD 15 MINUTES (ANESTHESIA): Performed by: SURGERY

## 2018-07-16 DEVICE — MESH HERN W1XL4IN INGUINAL POLYPR MFIL RECTANG: Type: IMPLANTABLE DEVICE | Site: INGUINAL | Status: FUNCTIONAL

## 2018-07-16 DEVICE — AGENT HEMOSTATIC SURGIFLOW MATRIX KIT W/THROMBIN: Type: IMPLANTABLE DEVICE | Site: ABDOMEN | Status: FUNCTIONAL

## 2018-07-16 DEVICE — IMPLANTABLE DEVICE: Type: IMPLANTABLE DEVICE | Site: INGUINAL | Status: FUNCTIONAL

## 2018-07-16 RX ORDER — LIDOCAINE HYDROCHLORIDE 20 MG/ML
INJECTION, SOLUTION INFILTRATION; PERINEURAL PRN
Status: DISCONTINUED | OUTPATIENT
Start: 2018-07-16 | End: 2018-07-16 | Stop reason: SDUPTHER

## 2018-07-16 RX ORDER — FENTANYL CITRATE 50 UG/ML
50 INJECTION, SOLUTION INTRAMUSCULAR; INTRAVENOUS EVERY 5 MIN PRN
Status: COMPLETED | OUTPATIENT
Start: 2018-07-16 | End: 2018-07-16

## 2018-07-16 RX ORDER — PROPOFOL 10 MG/ML
INJECTION, EMULSION INTRAVENOUS PRN
Status: DISCONTINUED | OUTPATIENT
Start: 2018-07-16 | End: 2018-07-16 | Stop reason: SDUPTHER

## 2018-07-16 RX ORDER — DEXAMETHASONE SODIUM PHOSPHATE 4 MG/ML
INJECTION, SOLUTION INTRA-ARTICULAR; INTRALESIONAL; INTRAMUSCULAR; INTRAVENOUS; SOFT TISSUE PRN
Status: DISCONTINUED | OUTPATIENT
Start: 2018-07-16 | End: 2018-07-16 | Stop reason: SDUPTHER

## 2018-07-16 RX ORDER — MEPERIDINE HYDROCHLORIDE 25 MG/ML
12.5 INJECTION INTRAMUSCULAR; INTRAVENOUS; SUBCUTANEOUS EVERY 5 MIN PRN
Status: DISCONTINUED | OUTPATIENT
Start: 2018-07-16 | End: 2018-07-16 | Stop reason: HOSPADM

## 2018-07-16 RX ORDER — PROMETHAZINE HYDROCHLORIDE 25 MG/ML
12.5 INJECTION, SOLUTION INTRAMUSCULAR; INTRAVENOUS
Status: DISCONTINUED | OUTPATIENT
Start: 2018-07-16 | End: 2018-07-16 | Stop reason: HOSPADM

## 2018-07-16 RX ORDER — FENTANYL CITRATE 50 UG/ML
25 INJECTION, SOLUTION INTRAMUSCULAR; INTRAVENOUS EVERY 5 MIN PRN
Status: DISCONTINUED | OUTPATIENT
Start: 2018-07-16 | End: 2018-07-16 | Stop reason: HOSPADM

## 2018-07-16 RX ORDER — LABETALOL HYDROCHLORIDE 5 MG/ML
5 INJECTION, SOLUTION INTRAVENOUS EVERY 10 MIN PRN
Status: DISCONTINUED | OUTPATIENT
Start: 2018-07-16 | End: 2018-07-16 | Stop reason: HOSPADM

## 2018-07-16 RX ORDER — OXYCODONE HYDROCHLORIDE AND ACETAMINOPHEN 5; 325 MG/1; MG/1
2 TABLET ORAL EVERY 4 HOURS PRN
Status: DISCONTINUED | OUTPATIENT
Start: 2018-07-16 | End: 2018-07-18 | Stop reason: HOSPADM

## 2018-07-16 RX ORDER — SODIUM CHLORIDE 9 MG/ML
INJECTION, SOLUTION INTRAVENOUS CONTINUOUS PRN
Status: DISCONTINUED | OUTPATIENT
Start: 2018-07-16 | End: 2018-07-16 | Stop reason: SDUPTHER

## 2018-07-16 RX ORDER — FENTANYL CITRATE 50 UG/ML
INJECTION, SOLUTION INTRAMUSCULAR; INTRAVENOUS PRN
Status: DISCONTINUED | OUTPATIENT
Start: 2018-07-16 | End: 2018-07-16 | Stop reason: SDUPTHER

## 2018-07-16 RX ORDER — SUCCINYLCHOLINE CHLORIDE 20 MG/ML
INJECTION INTRAMUSCULAR; INTRAVENOUS PRN
Status: DISCONTINUED | OUTPATIENT
Start: 2018-07-16 | End: 2018-07-16 | Stop reason: SDUPTHER

## 2018-07-16 RX ORDER — BUPIVACAINE HYDROCHLORIDE AND EPINEPHRINE 5; 5 MG/ML; UG/ML
INJECTION, SOLUTION EPIDURAL; INTRACAUDAL; PERINEURAL PRN
Status: DISCONTINUED | OUTPATIENT
Start: 2018-07-16 | End: 2018-07-16 | Stop reason: HOSPADM

## 2018-07-16 RX ORDER — GLYCOPYRROLATE 1 MG/5 ML
SYRINGE (ML) INTRAVENOUS PRN
Status: DISCONTINUED | OUTPATIENT
Start: 2018-07-16 | End: 2018-07-16 | Stop reason: SDUPTHER

## 2018-07-16 RX ORDER — ONDANSETRON 2 MG/ML
INJECTION INTRAMUSCULAR; INTRAVENOUS PRN
Status: DISCONTINUED | OUTPATIENT
Start: 2018-07-16 | End: 2018-07-16 | Stop reason: SDUPTHER

## 2018-07-16 RX ADMIN — FENTANYL CITRATE 50 MCG: 50 INJECTION, SOLUTION INTRAMUSCULAR; INTRAVENOUS at 19:11

## 2018-07-16 RX ADMIN — Medication 140 MG: at 16:44

## 2018-07-16 RX ADMIN — CIPROFLOXACIN 400 MG: 2 INJECTION, SOLUTION INTRAVENOUS at 21:53

## 2018-07-16 RX ADMIN — LIDOCAINE HYDROCHLORIDE 80 MG: 20 INJECTION, SOLUTION INFILTRATION; PERINEURAL at 16:44

## 2018-07-16 RX ADMIN — PHENYLEPHRINE HYDROCHLORIDE 200 MCG: 10 INJECTION INTRAVENOUS at 17:05

## 2018-07-16 RX ADMIN — ONDANSETRON HYDROCHLORIDE 4 MG: 4 INJECTION, SOLUTION INTRAMUSCULAR; INTRAVENOUS at 16:50

## 2018-07-16 RX ADMIN — CIPROFLOXACIN 400 MG: 2 INJECTION, SOLUTION INTRAVENOUS at 06:21

## 2018-07-16 RX ADMIN — SIMVASTATIN 5 MG: 10 TABLET, FILM COATED ORAL at 21:50

## 2018-07-16 RX ADMIN — LISINOPRIL 10 MG: 10 TABLET ORAL at 12:40

## 2018-07-16 RX ADMIN — FENTANYL CITRATE 50 MCG: 50 INJECTION, SOLUTION INTRAMUSCULAR; INTRAVENOUS at 18:52

## 2018-07-16 RX ADMIN — ONDANSETRON 4 MG: 2 INJECTION INTRAMUSCULAR; INTRAVENOUS at 20:25

## 2018-07-16 RX ADMIN — SODIUM CHLORIDE: 9 INJECTION, SOLUTION INTRAVENOUS at 21:50

## 2018-07-16 RX ADMIN — PHENYLEPHRINE HYDROCHLORIDE 100 MCG: 10 INJECTION INTRAVENOUS at 17:00

## 2018-07-16 RX ADMIN — FENTANYL CITRATE 25 MCG: 50 INJECTION INTRAMUSCULAR; INTRAVENOUS at 17:23

## 2018-07-16 RX ADMIN — AMLODIPINE BESYLATE 10 MG: 10 TABLET ORAL at 12:40

## 2018-07-16 RX ADMIN — FENTANYL CITRATE 25 MCG: 50 INJECTION INTRAMUSCULAR; INTRAVENOUS at 17:50

## 2018-07-16 RX ADMIN — FENTANYL CITRATE 50 MCG: 50 INJECTION, SOLUTION INTRAMUSCULAR; INTRAVENOUS at 18:57

## 2018-07-16 RX ADMIN — DEXAMETHASONE SODIUM PHOSPHATE 8 MG: 4 INJECTION, SOLUTION INTRAMUSCULAR; INTRAVENOUS at 16:50

## 2018-07-16 RX ADMIN — SODIUM CHLORIDE: 9 INJECTION, SOLUTION INTRAVENOUS at 16:38

## 2018-07-16 RX ADMIN — Medication 32.6 MILLICURIE: at 09:22

## 2018-07-16 RX ADMIN — FENTANYL CITRATE 50 MCG: 50 INJECTION INTRAMUSCULAR; INTRAVENOUS at 16:49

## 2018-07-16 RX ADMIN — PROPOFOL 130 MG: 10 INJECTION, EMULSION INTRAVENOUS at 16:44

## 2018-07-16 RX ADMIN — Medication 10.7 MILLICURIE: at 08:20

## 2018-07-16 RX ADMIN — Medication 0.2 MG: at 16:46

## 2018-07-16 RX ADMIN — ACETAMINOPHEN 650 MG: 325 TABLET ORAL at 21:52

## 2018-07-16 RX ADMIN — FENTANYL CITRATE 50 MCG: 50 INJECTION, SOLUTION INTRAMUSCULAR; INTRAVENOUS at 19:05

## 2018-07-16 RX ADMIN — SODIUM CHLORIDE: 9 INJECTION, SOLUTION INTRAVENOUS at 15:30

## 2018-07-16 ASSESSMENT — PULMONARY FUNCTION TESTS
PIF_VALUE: 16
PIF_VALUE: 16
PIF_VALUE: 22
PIF_VALUE: 22
PIF_VALUE: 16
PIF_VALUE: 16
PIF_VALUE: 2
PIF_VALUE: 16
PIF_VALUE: 17
PIF_VALUE: 21
PIF_VALUE: 17
PIF_VALUE: 1
PIF_VALUE: 1
PIF_VALUE: 0
PIF_VALUE: 16
PIF_VALUE: 17
PIF_VALUE: 22
PIF_VALUE: 22
PIF_VALUE: 16
PIF_VALUE: 17
PIF_VALUE: 16
PIF_VALUE: 15
PIF_VALUE: 17
PIF_VALUE: 16
PIF_VALUE: 21
PIF_VALUE: 0
PIF_VALUE: 27
PIF_VALUE: 17
PIF_VALUE: 17
PIF_VALUE: 0
PIF_VALUE: 17
PIF_VALUE: 17
PIF_VALUE: 16
PIF_VALUE: 16
PIF_VALUE: 17
PIF_VALUE: 17
PIF_VALUE: 23
PIF_VALUE: 22
PIF_VALUE: 22
PIF_VALUE: 15
PIF_VALUE: 17
PIF_VALUE: 16
PIF_VALUE: 21
PIF_VALUE: 15
PIF_VALUE: 15
PIF_VALUE: 16
PIF_VALUE: 16
PIF_VALUE: 17
PIF_VALUE: 18
PIF_VALUE: 2
PIF_VALUE: 23
PIF_VALUE: 16
PIF_VALUE: 15
PIF_VALUE: 3
PIF_VALUE: 15
PIF_VALUE: 16
PIF_VALUE: 23
PIF_VALUE: 22
PIF_VALUE: 16
PIF_VALUE: 17
PIF_VALUE: 17
PIF_VALUE: 1
PIF_VALUE: 18
PIF_VALUE: 17
PIF_VALUE: 16
PIF_VALUE: 17
PIF_VALUE: 17
PIF_VALUE: 16
PIF_VALUE: 22
PIF_VALUE: 23
PIF_VALUE: 16
PIF_VALUE: 15
PIF_VALUE: 16
PIF_VALUE: 17
PIF_VALUE: 21
PIF_VALUE: 19
PIF_VALUE: 17
PIF_VALUE: 28
PIF_VALUE: 20
PIF_VALUE: 17
PIF_VALUE: 17
PIF_VALUE: 3
PIF_VALUE: 17
PIF_VALUE: 16
PIF_VALUE: 16
PIF_VALUE: 18
PIF_VALUE: 23
PIF_VALUE: 16
PIF_VALUE: 16
PIF_VALUE: 15
PIF_VALUE: 17

## 2018-07-16 ASSESSMENT — PAIN DESCRIPTION - PAIN TYPE
TYPE: ACUTE PAIN

## 2018-07-16 ASSESSMENT — PAIN DESCRIPTION - LOCATION
LOCATION: ABDOMEN

## 2018-07-16 ASSESSMENT — PAIN SCALES - GENERAL
PAINLEVEL_OUTOF10: 7
PAINLEVEL_OUTOF10: 5
PAINLEVEL_OUTOF10: 7
PAINLEVEL_OUTOF10: 7
PAINLEVEL_OUTOF10: 2
PAINLEVEL_OUTOF10: 3
PAINLEVEL_OUTOF10: 3
PAINLEVEL_OUTOF10: 0
PAINLEVEL_OUTOF10: 7

## 2018-07-16 ASSESSMENT — PAIN DESCRIPTION - ORIENTATION
ORIENTATION: RIGHT;LEFT;ANTERIOR
ORIENTATION: ANTERIOR;RIGHT

## 2018-07-16 NOTE — PROGRESS NOTES
Rolando Crandall 60  INPATIENT OCCUPATIONAL THERAPY  [facility] ONC MED 5K  EVALUATION    Time:  Time In: 7440  Time Out: 0813  Timed Code Treatment Minutes: 15 Minutes  Minutes: 23          Date: 2018  Patient Name: Beatriz Calero,   Gender: male      MRN: [id number]  : 1933  (80 y.o.)  Referring Practitioner: Jason Beth MD  Diagnosis: Abdominal Pain  Additional Pertinent Hx: 80 y.o. male who presents to the Emergency Department for the evaluation of abdominal pain. Patient reports that this abdominal pain began yesterday morning and has significantly worsened since onset, rating it an 8/10 at this time. Patient is also experiencing a headache, nausea, constipation and chest pain. His last bowel movement was 1 days ago. He denies any jaw pain, vision changes, vomiting, weakness/tingling of the extremities, diarrhea, urinary abnormalities, shortness of breath, dizziness or diaphoresis. Patient denies having ever experienced symptoms this severe before. He has not eaten any new foods or noticed any appetite changes.  He has a history of hypertension    Restrictions/Precautions:  Fall Risk, General Precautions                Past Medical History:   Diagnosis Date    Hyperlipidemia     Hypertension     Osteoarthritis     PAC (premature atrial contraction)     Paget disease of bone      Past Surgical History:   Procedure Laterality Date    JOINT REPLACEMENT Bilateral 2013    HIP           Subjective  Chart Reviewed: Yes (med chart)  Patient assessed for rehabilitation services?: Yes  Family / Caregiver Present: No    Subjective: Pt received, shaving supine in bed, agreeable to OT prior to stress test  Comments: RN ok'd OT, reporting transport will be coming to get him for stress test this AM    General:  Overall Orientation Status: Within Functional Limits    Vision: Within Functional Limits    Hearing: Within functional limits         Pain:  Pain Assessment  Patient Currently in Pain: Yes  Pain Assessment: 0-10  Pain Level: 3  Pain Type: Acute pain  Pain Location: Abdomen  Pre Treatment Pain Screening  Intervention List: Patient able to continue with treatment    Social/Functional History:  Lives With:  (WIFE AND SON)  Type of Home: House  Home Layout: One level, Work area in basement  Home Access: Stairs to enter with rails  Entrance Stairs - Number of Steps: 7  Entrance Stairs - Rails: Right  Home Equipment: Cane, Rolling walker (wife uses cane)     Bathroom Shower/Tub: Walk-in shower  Bathroom Toilet: Standard  Bathroom Equipment:  (none)  Bathroom Accessibility: Accessible    Receives Help From: Family  ADL Assistance: Independent  Homemaking Assistance:  (pt reports doing laundry; son does remained)       Ambulation Assistance: Independent (no device)  Transfer Assistance: Independent    Active : Yes  Leisure & Hobbies: watch TV, go out to eat  Additional Comments: Pt reports living with son and wife, independent with BADLs at [facility] with son assisting for most IADL and denies using AD for functional mobility. Per RN, son reports pt only ambulates short distances at baseline and does shuffle feet.       Objective  Sensation  Overall Sensation Status: WNL               LUE AROM (degrees)  LUE AROM : WFL          RUE AROM (degrees)  RUE AROM : WFL       LUE Strength  L Shoulder Flex: 3+/5  L Elbow Flex: 4/5                RUE Strength  R Shoulder Flex: 3+/5  R Elbow Flex: 4/5            ADL  LE Dressing: Contact guard assistance, Increased time to complete, Moderate assistance, Maximum assistance, Verbal cueing (significantly increased time to complete; max assist for doffing soiled depends, mod A for threading BLE into clean depends, able to pull over hips with CGA-Min A for balance)  Additional Comments: pt demonstrated inc difficutly with LB ADLs this date with poor safety awareness     Bed mobility  Supine to Sit: Contact guard assistance (impulsive, HOB minimally raised, use of bedrail)  Sit to progress, Patient would benefit from continued therapy after discharge    Clinical Decision Making: Clinical Decision making was of Moderate Complexity as the result of analysis of data from a detailed assessment, a consideration of several treatment options, the presence of comorbidities affecting the plan of care and the need for minimal to moderate modifications or assistance required to complete the evaluation. Patient Education:  Patient Education: Role of OT, POC and goals, d/c planning, safety with transfers, ADLs    Equipment Recommendations: Other: cont to monitor pending d/c setting    Safety:  Safety Devices in place: Yes  Type of devices: Nurse notified (pt left with transport, assisted with t/f to w/c)    Plan:  Times per week: 3-5x  Current Treatment Recommendations: Strengthening, Balance Training, Functional Mobility Training, Endurance Training, Safety Education & Training, Self-Care / ADL, Patient/Caregiver Education & Training, Equipment Evaluation, Education, & procurement    Goals:  Patient goals : To get better    Short term goals  Time Frame for Short term goals: two weeks  Short term goal 1: pt to complete functional mobility at [facility] distances with no greater than SBA and no LOB for inc ind with accessing enviornment  Short term goal 2: pt to complete various functional transfers at SBA with no cues for safety for increased ind with toileting  Short term goal 3: pt to demonstrate dynamic standing with 1-2 hand release for greater than 4 mins with SBA in preparation for bathing tasks  Short term goal 4: pt to be independent with BUE strengthening HEP for increasing overall functional strength and activity toelrance  Long term goals  Time Frame for Long term goals : NA d/t ELOS    Evaluation Complexity: Based on the findings of patient history, examination, clinical presentation, and decision making during this evaluation, this patient is of medium complexity.     OT G-codes  Functional

## 2018-07-16 NOTE — PROGRESS NOTES
Hospitalist Progress Note      Patient:  Matthieu Gray      Unit/Bed:5K-02/002-A    YOB: 1933    MRN: [id number]       Acct: [de-identified]     PCP: Marina Jacinto MD    Date of Admission: 7/14/2018    Assessment/Plan:    1. Generalized/RUQ abd pain -- per gen surgery -- likely due to #2 but no findings on imaging of acute digna but clinical exam suspicious for acute digna thus Dr. Stewart Richard for lap digna 7/16  -- ??ischemic with elevated LA   -- LFT WNL except elevated alk phos which appears chronic -- bili trending up 7/15 to 2.0 -> monitor  -- large inguinal hernia contrib?  -- ?constipation contribute  -- lipase WNL 7/14/18, u/a 7/15 (-)  2. Possible acute cholecystitis with abnormal GB on imaging -- cipro started 7/14 by gen surgery -- plan lap digna 7/16 -- GB with sludge vs mass per u/s 7/14/18   3. Hyponatremia -- likely due to hypovolemia -- IVF -- up and down/low normal -- TSH 7/14/18 WNL - monitor - currently asx  4. Metabolic acidosis/Lactic acidosis -- due to #1,2 -- no other qualifications for sepsis -- 2.9 on admission 7/14 and repeat improving 1.8 7/14 -- cont monitor  5. Elevated alk phos -- ?related to #2 -- per surg plan lap digna 7/16 -- rest of LFT WNL -- lipase WNL 7/14 -- ?paget's disease and ?bony cause of elevation  6. Low grade fever -- due to #2 likely -- u/a 7/15 (-) -- Cipro 7/14 for #2  7. Leukocytosis -- stable 11's - no other qualifications for sepsis -- started on IV cipro 7/14 per surgery for possible cholecystitis  8. Hyperbilirubinemia -- ?due to #2 -- trending up 7/15 - monitor -- CT abd 7/14 showed small liver and ?cirrhosis  9. Normocytic anemia -- down to 12.4 from 14.2 on admission --> stable in 12's -- ?baseline - likely hemoconcentrated on admission and likely down due to dilution -- ?risk for ischemic bowel with pain, elevated LA but no hx of blood/dark stools -- monitor - ??check further studies if cont trend down.   10. Hypocalcemia -- replace prn 37.1*  37.5*   PLT  181  156  143     Recent Labs      07/14/18   0921  07/14/18   2032  07/15/18   0918  07/16/18   0635   NA  137   --   134*  135   K  3.6   --   3.8  4.1   CL  99   --   100  102   CO2  21*   --   21*  20*   BUN  10   --   12  14   CREATININE  1.5*   --   1.3*  1.3*   CALCIUM  8.9   --   8.5  8.5   PHOS   --   2.1*   --   2.4     Recent Labs      07/14/18   0921  07/15/18   0918   AST  17  24   ALT  9*  15   BILIDIR  <0.2   --    BILITOT  0.6  2.0*   ALKPHOS  201*  177*     Recent Labs      07/15/18   0319   INR  1.18*     No results for input(s): Rinku Car in the last 72 hours. Urinalysis:      Lab Results   Component Value Date    NITRU NEGATIVE 07/15/2018    WBCUA NONE SEEN 07/15/2018    BACTERIA NONE 07/15/2018    RBCUA 3-5 07/15/2018    BLOODU SMALL 07/15/2018    GLUCOSEU NEGATIVE 07/15/2018       Radiology:  US GALLBLADDER RUQ   Final Result   1. There is an irregular hypodense masslike lesion within the gallbladder measuring 3.6 x 1.9 x 2.9 cm which most likely represents tumefactive sludge. There is no associated vascularity and a gallbladder mass is felt to be less likely although not    entirely excluded. There are no mobile shadowing gallstones. A follow-up ultrasound examination of the gallbladder in one month is recommended to confirm stability. **This report has been created using voice recognition software. It may contain minor errors which are inherent in voice recognition technology. **      Final report electronically signed by Dr. Lynda Hernadze on 7/14/2018 2:15 PM      CT ABDOMEN PELVIS W IV CONTRAST Additional Contrast? Radiologist Recommendation   Final Result   1. Large right inguinal hernia contains a few bowel loops. These are not incarcerated, however. No evidence for bowel obstruction. 2. Findings of constipation. Large prostate gland. Diverticulosis of the colon. No evidence for diverticulitis.    3. Findings suggestive of milk of calcium bile in the gallbladder possibly a few tiny gallstones. No acute findings involving the gallbladder. 4.. Fibroemphysematous lungs. Bilateral renal nodules. Liver relatively small in size. Cannot assess for cirrhosis. Mild splenomegaly. **This report has been created using voice recognition software. It may contain minor errors which are inherent in voice recognition technology. **      Final report electronically signed by Dr. Fidelina Alcantara on 7/14/2018 11:11 AM      CT Head WO Contrast   Final Result   No acute intracranial process. Chronic findings, discussed above. No change from prior study. **This report has been created using voice recognition software. It may contain minor errors which are inherent in voice recognition technology. **      Final report electronically signed by Dr. Fidelina Alcantara on 7/14/2018 11:02 AM      XR CHEST STANDARD (2 VW)   Final Result   Normal chest. No acute findings. **This report has been created using voice recognition software. It may contain minor errors which are inherent in voice recognition technology. **      Final report electronically signed by Dr. Fidelina Alcantara on 7/14/2018 9:11 AM          Diet: Diet NPO, After Midnight    Toro: no    Microbiology:  U/a 7/15 (-)    Tele:  SR with frequent PACs - occ up to 130's    DVT prophylaxis: [] Lovenox                                 [x] SCDs                                 [] SQ Heparin                                 [] Encourage ambulation           [] Already on Anticoagulation     Disposition:    [] Home       [] TCU       [] Rehab       [] Psych       [] SNF       [] [place]       [x] Other-??  Need TCU or ECF pending how recovers from surgery    Code Status: Full Code    PT/OT Eval Status: consult 7/15        Electronically signed by Brad Murray MD on 7/16/2018 at 11:46 AM when pt evaluated - final note filed late

## 2018-07-16 NOTE — PROGRESS NOTES
Cardiology Progress Note      Patient:  Nga Mejía  YOB: 1933  MRN: [id number]   Acct: [de-identified]  Admit Date:  7/14/2018  Primary Cardiologist: None, seen by Verito Mathews    Per Dr. Cruzito Loredo note:  CHIEF COMPLAINT: Preop evaluation      HPI:\" This is a pleasant 80 y.o. male presents with abdominal pain, nausea, and vomiting. CT abdomen with non-incarcerated hernia, tumefactive sludge in the gallbladder consistent with mild acute cholecystitis. He does have 2/10 chest pain, SS, pressure that comes and goes. No sob. No exertional worsening of the pain. He has not had palpitations or syncope. He does not have a hx of cardiac issues. Allergy to ASA. No smoking. Denies CVA, DM II with insulin. He has CKD, Cr 1.3. Trop negative x 3. . ECG with SR with PACs and sinus arrhythmia. Does not take [employer] [address]. \"      Subjective (Events in last 24 hours): Resting in bed, alert in nad. No complaints of chest pain, palpitations or sob overnight. Stress test completed without evidence of ischemia.  Awaiting surgery this afternoon       Objective:   BP (!) 138/90   Pulse 101   Temp 99.3 °F (37.4 °C) (Oral)   Resp 16   Ht 6' (1.829 m)   Wt 195 lb (88.5 kg)   SpO2 95%   BMI 26.45 kg/m²        TELEMETRY: SR with PAC's    Physical Exam:  General Appearance: alert and oriented to person, place and time, in no acute distress  Cardiovascular: normal rate, regular rhythm, normal S1 and S2, no murmurs, rubs, clicks, or gallops, distal pulses intact, no carotid bruits, no JVD  Pulmonary/Chest: clear to auscultation bilaterally- no wheezes, rales or rhonchi, normal air movement, no respiratory distress  Abdomen: soft, non-tender, non-distended, normal bowel sounds, no masses   Extremities: no cyanosis, clubbing or edema, pulses palpable  Skin: warm and dry, no rash or erythema  Head: normocephalic and atraumatic  Eyes: pupils equal, round, and reactive to light  Neck: supple and non-tender without mass, no thyromegaly   Musculoskeletal: normal range of motion, no joint swelling, deformity or tenderness  Neurological: alert, oriented, normal speech, no focal findings or movement disorder noted    Medications:    potassium (CARDIAC) replacement protocol   Other RX Placeholder    magnesium replacement protocol   Other RX Placeholder    phosphorus replacement protocol   Other RX Placeholder    docusate sodium  100 mg Oral BID    amLODIPine  10 mg Oral Daily    lisinopril  10 mg Oral Daily    simvastatin  5 mg Oral Nightly    sodium chloride flush  10 mL Intravenous 2 times per day    ciprofloxacin  400 mg Intravenous Q12H      sodium chloride 75 mL/hr at 07/15/18 0254       polyethylene glycol 17 g Daily PRN   sodium chloride flush 10 mL PRN   acetaminophen 650 mg Q4H PRN   ondansetron 4 mg Q6H PRN   morphine 2 mg Q4H PRN   oxyCODONE-acetaminophen 1 tablet Q4H PRN       Diagnostics:  EKG:  None today    Echo: Pending  EF: 60% per stress test 7/16/18    Stress: 7/16/18  Summary   Lexiscan EKG stress test is not suggestive for ischemia.   Calculated gated LVEF 69 %.   The T.I.D. ratio was 1.04 .   Myocardial perfusion imaging is not suggestive for myocardial ischemia.   This study was negative for ischemia.     Lab Data:    Cardiac Enzymes:  No results for input(s): CKTOTAL, CKMB, CKMBINDEX, TROPONINI in the last 72 hours.     CBC:   Lab Results   Component Value Date    WBC 11.0 07/16/2018    RBC 4.16 07/16/2018    HGB 12.7 07/16/2018    HCT 37.5 07/16/2018     07/16/2018       CMP:  Lab Results   Component Value Date     07/16/2018    K 4.1 07/16/2018     07/16/2018    CO2 20 07/16/2018    BUN 14 07/16/2018    CREATININE 1.3 07/16/2018    LABGLOM 52 07/16/2018    GLUCOSE 110 07/16/2018    GLUCOSE 93 09/12/2016    CALCIUM 8.5 07/16/2018       Hepatic Function Panel:  Lab Results   Component Value Date    ALKPHOS 177 07/15/2018    ALT 15 07/15/2018    AST 24 07/15/2018    PROT 6.7 07/15/2018

## 2018-07-16 NOTE — PROGRESS NOTES
[address] [address]  INPATIENT PHYSICAL THERAPY  EVALUATION  [facility] ONC MED 5K - 5K-02/002-A    Time In: 1120  Time Out: 1143  Timed Code Treatment Minutes: 10 Minutes  Minutes: 23          Date: 2018  Patient Name: Sherrill Humphreys,  Gender:  male        MRN: [id number]  : 1933  (80 y.o.)  Referral Date : 07/15/18   Referring Practitioner: Pamela Ojeda MD  Diagnosis: Abdominal pain  Additional Pertinent Hx: 80 y.o. male who presents to the Emergency Department for the evaluation of abdominal pain. Patient reports that this abdominal pain began yesterday morning and has significantly worsened since onset, rating it an 8/10 at this time. Patient is also experiencing a headache, nausea, constipation and chest pain. His last bowel movement was 1 days ago. He denies any jaw pain, vision changes, vomiting, weakness/tingling of the extremities, diarrhea, urinary abnormalities, shortness of breath, dizziness or diaphoresis. Patient denies having ever experienced symptoms this severe before. He has not eaten any new foods or noticed any appetite changes. He has a history of hypertension     Past Medical History:   Diagnosis Date    Hyperlipidemia     Hypertension     Osteoarthritis     PAC (premature atrial contraction)     Paget disease of bone      Past Surgical History:   Procedure Laterality Date    JOINT REPLACEMENT Bilateral     HIP       Restrictions/Precautions:  Fall Risk, General Precautions                            Subjective:  Chart Reviewed: Yes  Patient assessed for rehabilitation services?: Yes  Family / Caregiver Present: No  Subjective: RN approved PT session. Patient lying in bed upon PT arrival, agreeable to participating in therapy. He states he is feeling a little fatigued from a busy morning.     General:  Overall Orientation Status: Within Normal Limits  Follows Commands: Within Functional Limits    Vision: Within Functional Limits    Hearing: Within functional limits Pain:  Denies. Social/Functional History:    Lives With: Family (wife and son)  Type of Home: House  Home Layout: One level, Work area in basement  Home Access: Stairs to enter with rails  [address] - Number of Steps: 7  Entrance Stairs - Rails: Right  Home Equipment: Cane, Rolling walker (wife uses cane)     Bathroom Shower/Tub: Walk-in shower  Bathroom Toilet: Standard  Bathroom Equipment:  (none)  Bathroom Accessibility: Accessible    Receives Help From: Family  ADL Assistance: Independent  Homemaking Assistance: Needs assistance (pt does the laundry and his son does the cooking, cleaning and yardwork)  Ambulation Assistance: Independent (no device)  Transfer Assistance: Independent    Active : Yes  Leisure & Hobbies: watch TV, go out to eat  Additional Comments: Pt reports living with son and wife, independent with BADLs at [facility] with son assisting for most IADL and denies using AD for functional mobility. Per RN, son reports pt only ambulates short distances at baseline and does shuffle feet. Patient states that he last fell a couple months ago when he \"just went down. \"  Patient states that he sleeps in a regular, flat bed at home and that he normally does not have any trouble getting into/out of bed.       Objective:       RLE AROM: WNL         LLE AROM : WNL      Strength RLE:  (grossly 4/5)    Strength LLE:  (grossly 4/5)      Sensation  Overall Sensation Status: WNL    RLE Tone: Normotonic  LLE Tone: Normotonic       Balance  Posture: Fair  Sitting - Static: Good  Sitting - Dynamic: Good  Standing - Static: Fair  Standing - Dynamic: Poor    Supine to Sit: Stand by assistance (with HOB slightly raised + use of bed rail; verbal cues for log rolling and safety)  Sit to Supine: Stand by assistance    Transfers  Sit to Stand: Contact guard assistance (verbal cues for hand placement and safety)  Stand to sit: Contact guard assistance       Ambulation 1  Surface: level tile  Device: [address]: Contact guard assistance  Quality of Gait: pt demonstrates a decreased alejo, decreased B step length and foot clearance, slightly forward flexed posture, unsteady  Distance: 30'  Comments: verbal cues for upright posture and safety    Ambulation 2  Surface - 2: level tile  Device 2: No device  Assistance 2: Minimal assistance  Quality of Gait 2: pt demonstrates a wide based gait, decreased alejo, decreased heel to toe gait pattern (even more so without walker)  Distance: 20'  Comments: pt noted to be reaching out for environmental support, as well as IV pole while ambulating; verbal cues for safety      Exercises:  Comments: Patient completed the following B LE exercises x 10 reps each for increased strength: LAQs, heel/toe raises, heel slides, hip abd/add, glut sets, quad sets. Verbal cues for form. Activity Tolerance:  Activity Tolerance: Patient limited by fatigue;Patient limited by endurance    Treatment Initiated: PT eval completed. Also completed B LE strengthening exercises, see above. Assessment: Body structures, Functions, Activity limitations: Decreased functional mobility , Decreased strength, Decreased endurance, Decreased balance, Decreased safe awareness  Assessment: Patient tolerated PT session fairly well. He presents with impaired strength, balance, endurance, functional mobility and safety awareness. He is at risk for falls, and overall demos improved balance and safety with ambulation with use of a walker. He will benefit from continued PT in order to maximize his safety and functional independence.   Prognosis: Good    Clinical Presentation: Moderate - Evolving with Changing Characteristics: . Due to an analysis of data from a detailed assessment, a consideration of several treatment options, the presence of co morbidities that affect the POC, and the need for minimal to moderate modifications or assistance needed to complete the evaluation. Decision Making: Moderate Complexitybased on patient assessment and decision making process of determining plan of care and establishing reasonable expectations for measurable functional outcomes    REQUIRES PT FOLLOW UP: Yes  Discharge Recommendations: Continue to assess pending progress, Patient would benefit from continued therapy after discharge    Patient Education:  Patient Education: Role of PT, POC, transfers, ambulation, safety, LE therex    Equipment Recommendations: Other: continue to assess    Safety:  Type of devices: All fall risk precautions in place, Bed alarm in place, Call light within reach, Gait belt, Left in bed, Nurse notified    Plan:  Times per week: 5x GM  Times per day: Daily  Current Treatment Recommendations: Strengthening, Balance Training, Functional Mobility Training, Transfer Training, Gait Training, Stair training, Equipment Evaluation, Education, & procurement, Patient/Caregiver Education & Training, Safety Education & Training, Endurance Training    Goals:  Patient goals : Go home  Short term goals  Time Frame for Short term goals: 2 weeks  Short term goal 1: Patient will transfer supine <--> sit with mod I in order to get into/out of bed. Short term goal 2: Patient will transfer sit <--> stand with mod I in order to get up to ambulate. Short term goal 3: Patient will ambulate 80' with SBA using least restrictive AD for household ambulation. Short term goal 4: Patient will negotiate 7 steps with R hand rail in order to get into/out of his home. Long term goals  Time Frame for Long term goals : No LTGs due to estimated length of stay    Evaluation Complexity: Based on the findings of patient history, examination, clinical presentation, and decision making during this evaluation, the evaluation of Denise Le  is of medium complexity.     PT G-Codes  Functional Assessment Tool Used: Professional judgement  Functional Limitation: Mobility: Walking and moving around  Mobility: Walking and Moving Around Current Status (): At least 40 percent but less than 60 percent impaired, limited or restricted  Mobility: Walking and Moving Around Goal Status ():  At least 1 percent but less than 20 percent impaired, limited or restricted       AM-PAC Inpatient Mobility without Stair Climbing Raw Score : 15  AM-PAC Inpatient without Stair Climbing T-Scale Score : 43.03  Mobility Inpatient CMS 0-100% Score: 47.43  Mobility Inpatient without Stair CMS G-Code Modifier : EDDIE Smith, PT, DPT

## 2018-07-16 NOTE — BRIEF OP NOTE
Brief Postoperative Note    Rebecca Martinez  YOB: 1933  [id number]    Pre-operative Diagnosis: 1. Acute Cholecystitis  2. RIH    Post-operative Diagnosis: Same with Sliding Indirect Hernia    Procedure: 1. Lap. Cholecystectomy  2. RIH Repair    Anesthesia: General    Surgeons/Assistants:  Jennyfer Stafufer    Estimated Blood Loss: less than 50     Complications: None    Specimens: Was Obtained:     Findings: see op note    Electronically signed by Robert Weeks MD on 7/16/2018 at 6:12 PM

## 2018-07-16 NOTE — PROGRESS NOTES
[address]  PHYSICAL THERAPY MISSED TREATMENT NOTE  ACUTE CARE    Date: 2018  Patient Name: Beatriz Calero        MRN: [id number]   : 1933  (80 y.o.)  Gender: male   Referring Practitioner: Jason Beth MD  Diagnosis: Abdominal Pain         REASON FOR MISSED TREATMENT:  Missed Treat. Patient off the floor at a stress test upon AM attempt. Will re-attempt PT at a later time as able.     Beth Summers, PT, DPT

## 2018-07-16 NOTE — ANESTHESIA PRE PROCEDURE
Department of Anesthesiology  Preprocedure Note       Name:  Denise Le   Age:  80 y.o.  :  1933                                          MRN:  [id number]         Date:  2018      Surgeon: Monica Hardin):  Kapil Roper MD    Procedure: Procedure(s):  CHOLECYSTECTOMY LAPAROSCOPIC  Right HERNIA INGUINAL REPAIR    Medications prior to admission:   Prior to Admission medications    Medication Sig Start Date End Date Taking?  Authorizing Provider   amLODIPine (NORVASC) 10 MG tablet TAKE 1 TABLET EVERY DAY 18  Yes Paloma Recinos MD   lisinopril (PRINIVIL;ZESTRIL) 10 MG tablet TAKE 1 TABLET EVERY DAY 18  Yes Paloma Recinos MD   simvastatin (ZOCOR) 5 MG tablet TAKE 1 TABLET  NIGHTLY 18  Yes Paloma Recinos MD       Current medications:    Current Facility-Administered Medications   Medication Dose Route Frequency Provider Last Rate Last Dose    potassium (CARDIAC) replacement protocol   Other RX Rhonda Davis MD        magnesium replacement protocol   Other RX Rhonda Davis MD        phosphorus replacement protocol   Other RX Placeholder Farhana Tang MD        docusate sodium (COLACE) capsule 100 mg  100 mg Oral BID Farhana Tang MD   100 mg at 07/15/18 2200    polyethylene glycol (GLYCOLAX) packet 17 g  17 g Oral Daily PRN Farhana Tang MD        0.9 % sodium chloride infusion   Intravenous Continuous Kapil Roper MD 75 mL/hr at 18 1530      amLODIPine (NORVASC) tablet 10 mg  10 mg Oral Daily Nabeel Stauffer MD   10 mg at 18 1240    lisinopril (PRINIVIL;ZESTRIL) tablet 10 mg  10 mg Oral Daily Nabeel Stauffer MD   10 mg at 18 1240    simvastatin (ZOCOR) tablet 5 mg  5 mg Oral Nightly Kapil Roper MD   5 mg at 07/15/18 2157    sodium chloride flush 0.9 % injection 10 mL  10 mL Intravenous 2 times per day Kapil Roper MD        sodium chloride flush 0.9 % injection 10 mL  10 mL Intravenous PRN Kapil Roper MD       [facility] auscultation                             Cardiovascular:    (+) hypertension:,         Rhythm: regular  Rate: normal    Stress test reviewed  Cleared by cardiology              Neuro/Psych:               GI/Hepatic/Renal:   (+) GERD:,           Endo/Other:                     Abdominal:       Abdomen: soft. Vascular:                                        Anesthesia Plan      general     ASA 4       Induction: intravenous. MIPS: Postoperative opioids intended and Prophylactic antiemetics administered. Anesthetic plan and risks discussed with patient.       Plan discussed with CRNA, attending and surgical team.                  Zackery Groves DO   7/16/2018

## 2018-07-17 LAB
ALBUMIN SERPL-MCNC: 3.2 G/DL (ref 3.5–5.1)
ALP BLD-CCNC: 170 U/L (ref 38–126)
ALT SERPL-CCNC: 18 U/L (ref 11–66)
ANION GAP SERPL CALCULATED.3IONS-SCNC: 14 MEQ/L (ref 8–16)
AST SERPL-CCNC: 26 U/L (ref 5–40)
BASOPHILS # BLD: 0 %
BASOPHILS ABSOLUTE: 0 THOU/MM3 (ref 0–0.1)
BILIRUB SERPL-MCNC: 1.2 MG/DL (ref 0.3–1.2)
BILIRUBIN DIRECT: 0.5 MG/DL (ref 0–0.3)
BUN BLDV-MCNC: 18 MG/DL (ref 7–22)
CALCIUM SERPL-MCNC: 8.7 MG/DL (ref 8.5–10.5)
CHLORIDE BLD-SCNC: 102 MEQ/L (ref 98–111)
CO2: 18 MEQ/L (ref 23–33)
CREAT SERPL-MCNC: 1.2 MG/DL (ref 0.4–1.2)
EOSINOPHIL # BLD: 0 %
EOSINOPHILS ABSOLUTE: 0 THOU/MM3 (ref 0–0.4)
ERYTHROCYTE [DISTWIDTH] IN BLOOD BY AUTOMATED COUNT: 13.2 % (ref 11.5–14.5)
ERYTHROCYTE [DISTWIDTH] IN BLOOD BY AUTOMATED COUNT: 43 FL (ref 35–45)
GFR SERPL CREATININE-BSD FRML MDRD: 58 ML/MIN/1.73M2
GLUCOSE BLD-MCNC: 171 MG/DL (ref 70–108)
HCT VFR BLD CALC: 37.9 % (ref 42–52)
HEMOGLOBIN: 12.8 GM/DL (ref 14–18)
IMMATURE GRANS (ABS): 0.08 THOU/MM3 (ref 0–0.07)
IMMATURE GRANULOCYTES: 0.7 %
LYMPHOCYTES # BLD: 3.3 %
LYMPHOCYTES ABSOLUTE: 0.4 THOU/MM3 (ref 1–4.8)
MCH RBC QN AUTO: 30.4 PG (ref 26–33)
MCHC RBC AUTO-ENTMCNC: 33.8 GM/DL (ref 32.2–35.5)
MCV RBC AUTO: 90 FL (ref 80–94)
MONOCYTES # BLD: 4.9 %
MONOCYTES ABSOLUTE: 0.5 THOU/MM3 (ref 0.4–1.3)
NUCLEATED RED BLOOD CELLS: 0 /100 WBC
PLATELET # BLD: 173 THOU/MM3 (ref 130–400)
PMV BLD AUTO: 10.3 FL (ref 9.4–12.4)
POTASSIUM SERPL-SCNC: 4.5 MEQ/L (ref 3.5–5.2)
RBC # BLD: 4.21 MILL/MM3 (ref 4.7–6.1)
SEG NEUTROPHILS: 91.1 %
SEGMENTED NEUTROPHILS ABSOLUTE COUNT: 10.1 THOU/MM3 (ref 1.8–7.7)
SODIUM BLD-SCNC: 134 MEQ/L (ref 135–145)
TOTAL PROTEIN: 6.8 G/DL (ref 6.1–8)
WBC # BLD: 11.1 THOU/MM3 (ref 4.8–10.8)

## 2018-07-17 PROCEDURE — 0FT44ZZ RESECTION OF GALLBLADDER, PERCUTANEOUS ENDOSCOPIC APPROACH: ICD-10-PCS | Performed by: SURGERY

## 2018-07-17 PROCEDURE — 0YU50JZ SUPPLEMENT RIGHT INGUINAL REGION WITH SYNTHETIC SUBSTITUTE, OPEN APPROACH: ICD-10-PCS | Performed by: SURGERY

## 2018-07-17 PROCEDURE — 85025 COMPLETE CBC W/AUTO DIFF WBC: CPT

## 2018-07-17 PROCEDURE — 99024 POSTOP FOLLOW-UP VISIT: CPT | Performed by: SURGERY

## 2018-07-17 PROCEDURE — 99024 POSTOP FOLLOW-UP VISIT: CPT | Performed by: NURSE PRACTITIONER

## 2018-07-17 PROCEDURE — 51798 US URINE CAPACITY MEASURE: CPT

## 2018-07-17 PROCEDURE — 99232 SBSQ HOSP IP/OBS MODERATE 35: CPT | Performed by: FAMILY MEDICINE

## 2018-07-17 PROCEDURE — 6360000002 HC RX W HCPCS: Performed by: SURGERY

## 2018-07-17 PROCEDURE — 6370000000 HC RX 637 (ALT 250 FOR IP): Performed by: SURGERY

## 2018-07-17 PROCEDURE — 82248 BILIRUBIN DIRECT: CPT

## 2018-07-17 PROCEDURE — 1200000003 HC TELEMETRY R&B

## 2018-07-17 PROCEDURE — 36415 COLL VENOUS BLD VENIPUNCTURE: CPT

## 2018-07-17 PROCEDURE — 97116 GAIT TRAINING THERAPY: CPT

## 2018-07-17 PROCEDURE — 6370000000 HC RX 637 (ALT 250 FOR IP): Performed by: FAMILY MEDICINE

## 2018-07-17 PROCEDURE — 2709999900 HC NON-CHARGEABLE SUPPLY

## 2018-07-17 PROCEDURE — 2580000003 HC RX 258: Performed by: SURGERY

## 2018-07-17 PROCEDURE — 97110 THERAPEUTIC EXERCISES: CPT

## 2018-07-17 PROCEDURE — APPSS30 APP SPLIT SHARED TIME 16-30 MINUTES: Performed by: NURSE PRACTITIONER

## 2018-07-17 PROCEDURE — 80053 COMPREHEN METABOLIC PANEL: CPT

## 2018-07-17 RX ORDER — TAMSULOSIN HYDROCHLORIDE 0.4 MG/1
0.4 CAPSULE ORAL 2 TIMES DAILY
Status: DISCONTINUED | OUTPATIENT
Start: 2018-07-17 | End: 2018-07-18 | Stop reason: HOSPADM

## 2018-07-17 RX ADMIN — CIPROFLOXACIN 400 MG: 2 INJECTION, SOLUTION INTRAVENOUS at 06:55

## 2018-07-17 RX ADMIN — ONDANSETRON 4 MG: 2 INJECTION INTRAMUSCULAR; INTRAVENOUS at 06:26

## 2018-07-17 RX ADMIN — MORPHINE SULFATE 2 MG: 2 INJECTION, SOLUTION INTRAMUSCULAR; INTRAVENOUS at 06:26

## 2018-07-17 RX ADMIN — MORPHINE SULFATE 2 MG: 2 INJECTION, SOLUTION INTRAMUSCULAR; INTRAVENOUS at 02:10

## 2018-07-17 RX ADMIN — LISINOPRIL 10 MG: 10 TABLET ORAL at 11:04

## 2018-07-17 RX ADMIN — DOCUSATE SODIUM 100 MG: 100 CAPSULE, LIQUID FILLED ORAL at 20:32

## 2018-07-17 RX ADMIN — SODIUM CHLORIDE: 9 INJECTION, SOLUTION INTRAVENOUS at 16:18

## 2018-07-17 RX ADMIN — CIPROFLOXACIN 400 MG: 2 INJECTION, SOLUTION INTRAVENOUS at 19:14

## 2018-07-17 RX ADMIN — SIMVASTATIN 5 MG: 10 TABLET, FILM COATED ORAL at 20:32

## 2018-07-17 RX ADMIN — TAMSULOSIN HYDROCHLORIDE 0.4 MG: 0.4 CAPSULE ORAL at 20:32

## 2018-07-17 RX ADMIN — AMLODIPINE BESYLATE 10 MG: 10 TABLET ORAL at 11:04

## 2018-07-17 ASSESSMENT — PAIN SCALES - GENERAL
PAINLEVEL_OUTOF10: 1
PAINLEVEL_OUTOF10: 5
PAINLEVEL_OUTOF10: 3
PAINLEVEL_OUTOF10: 5
PAINLEVEL_OUTOF10: 6
PAINLEVEL_OUTOF10: 3
PAINLEVEL_OUTOF10: 0

## 2018-07-17 ASSESSMENT — PAIN DESCRIPTION - ORIENTATION: ORIENTATION: RIGHT

## 2018-07-17 ASSESSMENT — PAIN DESCRIPTION - PAIN TYPE
TYPE: ACUTE PAIN
TYPE: ACUTE PAIN

## 2018-07-17 ASSESSMENT — PAIN DESCRIPTION - LOCATION
LOCATION: ABDOMEN
LOCATION: ABDOMEN;OTHER (COMMENT)

## 2018-07-17 ASSESSMENT — PAIN DESCRIPTION - DESCRIPTORS
DESCRIPTORS: DISCOMFORT
DESCRIPTORS: DISCOMFORT

## 2018-07-17 NOTE — PROGRESS NOTES
[address]  INPATIENT OCCUPATIONAL THERAPY  [facility] ONC MED 5K  DAILY NOTE    Time:  Time In: 1502  Time Out: 1518  Timed Code Treatment Minutes: 16 Minutes  Minutes: 16          Date: 2018  Patient Name: Alfredo Zuniga,   Gender: male      Room: [facility]002-A  MRN: [id number]  : 1933  (80 y.o.)  Referring Practitioner: Jasmyne Winters MD  Diagnosis: Abdominal Pain  Additional Pertinent Hx: 80 y.o. male who presents to the Emergency Department for the evaluation of abdominal pain. Patient reports that this abdominal pain began yesterday morning and has significantly worsened since onset, rating it an 8/10 at this time. Patient is also experiencing a headache, nausea, constipation and chest pain. His last bowel movement was 1 days ago. He denies any jaw pain, vision changes, vomiting, weakness/tingling of the extremities, diarrhea, urinary abnormalities, shortness of breath, dizziness or diaphoresis. Patient denies having ever experienced symptoms this severe before. He has not eaten any new foods or noticed any appetite changes.  He has a history of hypertension    Past Medical History:   Diagnosis Date    Hyperlipidemia     Hypertension     Osteoarthritis     PAC (premature atrial contraction)     Paget disease of bone      Past Surgical History:   Procedure Laterality Date    JOINT REPLACEMENT Bilateral     HIP    ME LAP,CHOLECYSTECTOMY N/A 2018    CHOLECYSTECTOMY LAPAROSCOPIC performed by Hugh Garza MD at [address] [place] [address] Right 2018    Right HERNIA INGUINAL REPAIR performed by [facility]       Restrictions/Precautions:  Fall Risk, General Precautions                            Prior Level of Function:  ADL Assistance: Independent  Homemaking Assistance: Needs assistance (pt does the laundry and his son does the cooking, cleaning and yardwork)  Ambulation Assistance: Independent (no device)  Transfer Assistance: Education: Role of OT, POC and goals, d/c planning, safety with transfers, ADLs    Equipment Recommendations: Other: cont to monitor pending d/c setting    Safety:  Safety Devices in place: Yes  Type of devices: All fall risk precautions in place, Bed alarm in place, Left in bed, Gait belt, Call light within reach    Plan:  Times per week: 3-5x  Current Treatment Recommendations: Strengthening, Balance Training, Functional Mobility Training, Endurance Training, Safety Education & Training, Self-Care / ADL, Patient/Caregiver Education & Training, Equipment Evaluation, Education, & procurement    Goals:  Patient goals :  To get better    Short term goals  Time Frame for Short term goals: two weeks  Short term goal 1: pt to complete functional mobility at [facility] [place] [facility] distances with no greater than SBA and no LOB for inc ind with accessing enviornment  Short term goal 2: pt to complete various functional transfers at SBA with no cues for safety for increased ind with toileting  Short term goal 3: pt to demonstrate dynamic standing with 1-2 hand release for greater than 4 mins with SBA in preparation for bathing tasks  Short term goal 4: pt to be independent with BUE strengthening HEP for increasing overall functional strength and activity toelrance  Long term goals  Time Frame for Long term goals : NA d/t DAWSON

## 2018-07-17 NOTE — PLAN OF CARE
Problem: Pain:  Goal: Pain level will decrease  Pain level will decrease   Outcome: Ongoing  Denies any pain at this time    Problem: Falls - Risk of:  Goal: Will remain free from falls  Will remain free from falls    Outcome: Ongoing  No fall this shift, call light in reach with bed alarm on. Problem: Activity:  Goal: Risk for activity intolerance will decrease  Risk for activity intolerance will decrease   Outcome: Ongoing  Out of bed with standby assist.     Problem: Bowel/Gastric:  Goal: Bowel function will improve  Bowel function will improve   Outcome: Ongoing  Active BS, denies passing gas. Needs BM. Did ambulate in the light twice today, continues with therapy. Problem: Physical Regulation:  Goal: Complications related to the disease process, condition or treatment will be avoided or minimized  Complications related to the disease process, condition or treatment will be avoided or minimized   Outcome: Ongoing      Problem: Discharge Planning:  Goal: Discharged to appropriate level of care  Discharged to appropriate level of care   Outcome: Ongoing  Home with spouse and HH    Problem: Musculor/Skeletal Functional Status  Goal: Highest potential functional level  Outcome: Ongoing  Some weakness in legs still, using walker to steady gait. Tolerates activity well. Comments: Care plan reviewed with patient. Patient verbalizes understanding of the plan of care and contributes to goal setting.
Problem: Pain:  Goal: Pain level will decrease  Pain level will decrease   Outcome: Ongoing  Patient denies need for pain medication when asked. Problem: Falls - Risk of:  Goal: Will remain free from falls  Will remain free from falls    Outcome: Ongoing  Fall assessment completed. Patient using call light appropriately to call for assistance with ambulation to bathroom. Personal items within reach. Patient is also compliant with use of non-skid slippers. Problem: Activity:  Goal: Risk for activity intolerance will decrease  Risk for activity intolerance will decrease   Outcome: Ongoing  Patient does not like to get up and move. PT/OT ordered for patient. Family reports that patient does not get up and move much at home and shuffles when walking. Problem: Bowel/Gastric:  Goal: Bowel function will improve  Bowel function will improve   Outcome: Ongoing  Patient bowel sounds active. Passing flatus and stool. Problem: Physical Regulation:  Goal: Complications related to the disease process, condition or treatment will be avoided or minimized  Complications related to the disease process, condition or treatment will be avoided or minimized   Outcome: Ongoing  Patient encouraged to get up to chair and walk to bathroom. Comments: Care plan reviewed with patient and family. Patient and family verbalize understanding of the plan of care and contribute to goal setting.
condition or treatment will be avoided or minimized   Outcome: Ongoing  Pt currently denying any need for further measures with comfort at this time. Will continue to monitor through surgery/stress test/echo scheduled for tomorrow. Problem: Discharge Planning:  Goal: Discharged to appropriate level of care  Discharged to appropriate level of care   Outcome: Ongoing  Pt planning d/c to home with family after [facility], denies any further needs at this time. Comments: Care plan reviewed with patient this shift. Patient verbalizes understanding of the plan of care and is contributing to goal setting.

## 2018-07-17 NOTE — PROGRESS NOTES
Hospitalist Progress Note      Patient:  Eri Ordoñez      Unit/Bed:5K-02/002-A    YOB: 1933    MRN: [id number]       Acct: [de-identified]     PCP: Maci Harrington MD    Date of Admission: 7/14/2018    Assessment/Plan:    1. Generalized/RUQ abd pain -- per gen surgery -- likely due to #2 but no findings on imaging of acute digna but clinical exam suspicious for acute digna --> s/p lap digna and RIH repair 7/16/18 by Dr. Homer Lawson  -- ??ischemic with elevated LA   -- LFT WNL except elevated alk phos which appears chronic -- bili trending up 7/15 to 2.0 -> monitor  -- large inguinal hernia contrib?  -- ?constipation contribute  -- lipase WNL 7/14/18, u/a 7/15 (-)  2. Possible acute cholecystitis with abnormal GB on imaging -- s/p lap digna 7/16/18 by Dr. Homer Lawson --  cipro started 7/14 by gen surgery -- plan lap digna 7/16 -- GB with sludge vs mass per u/s 7/14/18   3. Urinary retention -- 7/17 --> urology consulted as needed jane placed by urologist 7/17 am --> +large prostate on initial CT on admission --> started flomax bid 7/17 -- further mgmt and recs per urology  4. Gross hematuria -- likely related to trauma from attempts at jane --> rec monitor h/h - per urology  5. Hyponatremia -- likely due to hypovolemia -- IVF - ?stop -- up and down/low normal -- TSH 7/14/18 WNL - monitor - currently asx  6. Metabolic acidosis/Lactic acidosis -- due to #1,2 -- no other qualifications for sepsis -- 2.9 on admission 7/14 and repeat improving 1.8 7/14 -- cont monitor as slight trend down 7/17   7. Elevated alk phos -- ?related to #2 -- per surg s/p lap digna 7/16 -- rest of LFT WNL -- lipase WNL 7/14 -- ?paget's disease and ?bony cause of elevation  8. Low grade fever -- due to #2 likely -- u/a 7/15 (-) -- Cipro 7/14 for #2  9. Leukocytosis -- stable 11's - no other qualifications for sepsis -- started on IV cipro 7/14 per surgery for possible cholecystitis  10.  Hyperbilirubinemia -- ?due to #2 -- trending created using voice recognition software. It may contain minor errors which are inherent in voice recognition technology. **      Final report electronically signed by Dr. Michael Camarillo on 7/14/2018 9:11 AM          Diet: DIET GENERAL;    Toro: yes - placed 7/17 am    Microbiology:  U/a 7/15 (-)    Tele:  SR with frequent PACs - occ up to 130's    DVT prophylaxis: [] Lovenox                                 [x] SCDs                                 [] SQ Heparin                                 [] Encourage ambulation           [] Already on Anticoagulation     Disposition:    [x] Home with [facility] RN       [] TCU       [] Rehab       [] Psych       [] SNF       [] [place]       [] Other    Code Status: Full Code    PT/OT Eval Status: consult 7/15          Electronically signed by Dimitry Mcdermott MD on 7/17/2018 at 11:08 AM

## 2018-07-17 NOTE — PROGRESS NOTES
Pt currently resting in bed, is calm/cooperative. States pain is currently a 2/10 with \"pressure to pee\", denies any need for pain medication/measures at this time. Awaiting urology with  outside room.

## 2018-07-17 NOTE — PROGRESS NOTES
Spoke with Urology and notified of  cart at bedside--was advised at [phone] that they were on their way to place catheter. No other orders at this time.

## 2018-07-17 NOTE — PROCEDURES
A Bladder scan was performed at 0115 . The patient's last void was at 0112 only 25ML . The residual amount was measured to be 617 ML. Report of results was given to [facility].

## 2018-07-17 NOTE — PROGRESS NOTES
[facility]  INPATIENT PHYSICAL THERAPY  DAILY NOTE  [facility] ONC MED 5K - 5K-02/002-A    Time In: 7481  Time Out: 2238  Timed Code Treatment Minutes: 23 Minutes  Minutes: 23          Date: 2018  Patient Name: Wing Navarrete,  Gender:  male        MRN: [id number]  : 1933  (80 y.o.)  Referral Date : 07/15/18  Referring Practitioner: Liz Bowling MD  Diagnosis: Abdominal pain  Additional Pertinent Hx: 80 y.o. male who presents to the Emergency Department for the evaluation of abdominal pain. Patient reports that this abdominal pain began yesterday morning and has significantly worsened since onset, rating it an 8/10 at this time. Patient is also experiencing a headache, nausea, constipation and chest pain. His last bowel movement was 1 days ago. He denies any jaw pain, vision changes, vomiting, weakness/tingling of the extremities, diarrhea, urinary abnormalities, shortness of breath, dizziness or diaphoresis. Patient denies having ever experienced symptoms this severe before. He has not eaten any new foods or noticed any appetite changes.  He has a history of hypertension     Past Medical History:   Diagnosis Date    Hyperlipidemia     Hypertension     Osteoarthritis     PAC (premature atrial contraction)     Paget disease of bone      Past Surgical History:   Procedure Laterality Date    JOINT REPLACEMENT Bilateral     HIP    [place] LAP,CHOLECYSTECTOMY N/A 2018    CHOLECYSTECTOMY LAPAROSCOPIC performed by Desi Oleary MD at [address] [place] [address] Right 2018    Right HERNIA INGUINAL REPAIR performed by Desi Oleary MD at [place]       Restrictions/Precautions:  Fall Risk, General Precautions                            Prior Level of Function:  ADL Assistance: Independent  Homemaking Assistance: Needs assistance (pt does the laundry and his son does the cooking, cleaning and yardwork)  Ambulation Assistance: Independent (no device)  Transfer

## 2018-07-17 NOTE — PROGRESS NOTES
[address] [address]   Dr. Marc Rehman. Hixjose  Daily Progress Note  Pt Name: Devang Culp  Medical Record Number: [id number]  Date of Birth 9/19/1933   Today's Date: 7/17/2018    POD #1    CHIEF COMPLAINT acute cholecystitis large right inguinal hernia    S/p laparoscopic cholecystectomy and open right inguinal hernia repair    SUBJECTIVE  Patient feels better. Overnight he struggled with urinary retention, nursing staff had a difficult time inserting the jane. The patient states once the jane was placed he had a lot of urine drain out. The jane is still in place with moderate blood in the drainage bag. He denies abdominal pain, and reports he doesn't need any pain meds at this time. He denies nausea/vomiting. He has not passed gas nor had a bowel movement since surgery. He denies shortness or breath or chest pain. There are no other complaints at this time. OBJECTIVE  CURRENT VITALS BP (!) 148/78   Pulse 106   Temp 98.1 °F (36.7 °C) (Oral)   Resp 18   Ht 6' (1.829 m)   Wt 193 lb 4.8 oz (87.7 kg)   SpO2 98%   BMI 26.22 kg/m²   LUNGS: Lungs clear   CARDIAC: regular rate and rhythm   ABDOMEN: soft, (+) BSx4, (+) tenderness to palpation, non distended  WOUNDS: Dry and intact, no drainage or erythema. 24 HR INTAKE/OUTPUT :     Intake/Output Summary (Last 24 hours) at 07/17/18 1537  Last data filed at 07/17/18 1520   Gross per 24 hour   Intake          3704.06 ml   Output             1785 ml   Net          1919.06 ml   I/O last 3 completed shifts: In: 4470.1 [P.O.:1520;  I.V.:2950.1]  Out: 1285 [Urine:1275; Blood:10]  DRAIN/TUBE OUTPUT :      LABS  CBC :   Lab Results   Component Value Date    WBC 11.1 07/17/2018    HGB 12.8 07/17/2018    HCT 37.9 07/17/2018     07/17/2018     BMP:   Lab Results   Component Value Date     07/17/2018    K 4.5 07/17/2018     07/17/2018    CO2 18 07/17/2018    BUN 18 07/17/2018    CREATININE 1.2 07/17/2018    MG 1.6 07/16/2018    PHOS 2.4 07/16/2018

## 2018-07-18 ENCOUNTER — TELEPHONE (OUTPATIENT)
Dept: FAMILY MEDICINE CLINIC | Age: 83
End: 2018-07-18

## 2018-07-18 VITALS
WEIGHT: 193.3 LBS | BODY MASS INDEX: 26.18 KG/M2 | TEMPERATURE: 98.2 F | OXYGEN SATURATION: 96 % | HEIGHT: 72 IN | SYSTOLIC BLOOD PRESSURE: 126 MMHG | DIASTOLIC BLOOD PRESSURE: 77 MMHG | RESPIRATION RATE: 16 BRPM | HEART RATE: 100 BPM

## 2018-07-18 PROBLEM — K40.90 RIGHT INGUINAL HERNIA: Status: RESOLVED | Noted: 2018-07-15 | Resolved: 2018-07-18

## 2018-07-18 PROBLEM — Z90.49 S/P LAPAROSCOPIC CHOLECYSTECTOMY: Status: ACTIVE | Noted: 2018-07-18

## 2018-07-18 PROBLEM — Z87.19 S/P RIGHT INGUINAL HERNIA REPAIR: Status: ACTIVE | Noted: 2018-07-18

## 2018-07-18 PROBLEM — R10.9 ABDOMINAL PAIN: Status: RESOLVED | Noted: 2018-07-14 | Resolved: 2018-07-18

## 2018-07-18 PROBLEM — Z98.890 S/P RIGHT INGUINAL HERNIA REPAIR: Status: ACTIVE | Noted: 2018-07-18

## 2018-07-18 LAB
ANION GAP SERPL CALCULATED.3IONS-SCNC: 14 MEQ/L (ref 8–16)
BUN BLDV-MCNC: 19 MG/DL (ref 7–22)
CALCIUM SERPL-MCNC: 8.3 MG/DL (ref 8.5–10.5)
CHLORIDE BLD-SCNC: 102 MEQ/L (ref 98–111)
CO2: 21 MEQ/L (ref 23–33)
CREAT SERPL-MCNC: 1.3 MG/DL (ref 0.4–1.2)
GFR SERPL CREATININE-BSD FRML MDRD: 52 ML/MIN/1.73M2
GLUCOSE BLD-MCNC: 99 MG/DL (ref 70–108)
HCT VFR BLD CALC: 34.5 % (ref 42–52)
HEMOGLOBIN: 11.7 GM/DL (ref 14–18)
POTASSIUM SERPL-SCNC: 3.9 MEQ/L (ref 3.5–5.2)
SODIUM BLD-SCNC: 137 MEQ/L (ref 135–145)

## 2018-07-18 PROCEDURE — 6360000002 HC RX W HCPCS: Performed by: SURGERY

## 2018-07-18 PROCEDURE — 97535 SELF CARE MNGMENT TRAINING: CPT

## 2018-07-18 PROCEDURE — 36415 COLL VENOUS BLD VENIPUNCTURE: CPT

## 2018-07-18 PROCEDURE — 6370000000 HC RX 637 (ALT 250 FOR IP): Performed by: FAMILY MEDICINE

## 2018-07-18 PROCEDURE — 99232 SBSQ HOSP IP/OBS MODERATE 35: CPT | Performed by: NURSE PRACTITIONER

## 2018-07-18 PROCEDURE — 99024 POSTOP FOLLOW-UP VISIT: CPT | Performed by: NURSE PRACTITIONER

## 2018-07-18 PROCEDURE — 80048 BASIC METABOLIC PNL TOTAL CA: CPT

## 2018-07-18 PROCEDURE — 85014 HEMATOCRIT: CPT

## 2018-07-18 PROCEDURE — 6370000000 HC RX 637 (ALT 250 FOR IP): Performed by: SURGERY

## 2018-07-18 PROCEDURE — 97110 THERAPEUTIC EXERCISES: CPT

## 2018-07-18 PROCEDURE — 85018 HEMOGLOBIN: CPT

## 2018-07-18 PROCEDURE — 97116 GAIT TRAINING THERAPY: CPT

## 2018-07-18 RX ORDER — OXYCODONE HYDROCHLORIDE AND ACETAMINOPHEN 5; 325 MG/1; MG/1
1 TABLET ORAL EVERY 6 HOURS PRN
Qty: 10 TABLET | Refills: 0 | Status: SHIPPED | OUTPATIENT
Start: 2018-07-18 | End: 2018-07-25

## 2018-07-18 RX ORDER — TAMSULOSIN HYDROCHLORIDE 0.4 MG/1
0.4 CAPSULE ORAL 2 TIMES DAILY
Qty: 30 CAPSULE | Refills: 3 | Status: SHIPPED | OUTPATIENT
Start: 2018-07-18 | End: 2018-09-04 | Stop reason: SDUPTHER

## 2018-07-18 RX ORDER — IBUPROFEN 600 MG/1
600 TABLET ORAL EVERY 6 HOURS PRN
Qty: 21 TABLET | Refills: 0 | Status: SHIPPED | OUTPATIENT
Start: 2018-07-18 | End: 2019-09-03

## 2018-07-18 RX ADMIN — LISINOPRIL 10 MG: 10 TABLET ORAL at 10:03

## 2018-07-18 RX ADMIN — CIPROFLOXACIN 400 MG: 2 INJECTION, SOLUTION INTRAVENOUS at 06:28

## 2018-07-18 RX ADMIN — TAMSULOSIN HYDROCHLORIDE 0.4 MG: 0.4 CAPSULE ORAL at 10:03

## 2018-07-18 RX ADMIN — AMLODIPINE BESYLATE 10 MG: 10 TABLET ORAL at 10:03

## 2018-07-18 NOTE — PROGRESS NOTES
Urology Progress Note    Chief Complaint: Acute cholecystitis. Subjective: Patient is resting in bed, voiding clear yellow urine,  ambulating with assistance, tolerating regular diet, denies any nausea or vomiting. There are complaints of no pain at this time. Objective:        Vitals:  /77   Pulse 100   Temp 98.2 °F (36.8 °C) (Oral)   Resp 16   Ht 6' (1.829 m)   Wt 193 lb 4.8 oz (87.7 kg)   SpO2 96%   BMI 26.22 kg/m²   Temp  Av °F (36.7 °C)  Min: 97.4 °F (36.3 °C)  Max: 98.6 °F (37 °C)    Intake/Output Summary (Last 24 hours) at 18 0859  Last data filed at 18 2040   Gross per 24 hour   Intake             3634 ml   Output             4200 ml   Net             -566 ml       Social History     Social History    Marital status:      Spouse name: Shahrzad Sams Number of children: 4    Years of education: 8     Occupational History    Not on file. Social History Main Topics    Smoking status: Never Smoker    Smokeless tobacco: Never Used    Alcohol use No    Drug use: No    Sexual activity: Not on file     Other Topics Concern    Not on file     Social History Narrative    No narrative on file     Family History   Problem Relation Age of Onset    Diabetes Mother     Stroke Father     Stroke Sister     Cancer Brother     Heart Disease Brother     Stroke Brother      Allergies   Allergen Reactions    Asa [Aspirin]      bleeding         Constitutional: Alert and oriented times x3, no acute distress, and cooperative to examination with appropriate mood and affect. HEENT:   Head:         Normocephalic and atraumatic. Mucous membranes are normal.   Eyes:         EOM are normal. No scleral icterus. Nose:    The external appearance of the nose is normal  Ears: The ears appear normal to external inspection. Cardiovascular:       Normal rate, regular rhythm. Pulmonary/Chest:  Normal respiratory rate and rhthym. No use of accessory muscles.  Lungs

## 2018-07-18 NOTE — TELEPHONE ENCOUNTER
7/18/18 . Transition of Care visit scheduled.  7/24/18  Patient is being discharged to home from [facility]

## 2018-07-18 NOTE — OP NOTE
reduce the complex without  really ligating a sac and then I further incised the transversus plane into  the preperitoneal space and placed a Ventrio ST mesh 8 x 12 cm. We then  took a 1 x 4 piece of mesh, sutured it to pubic tubercle medially, conjoint  tendon superiorly and shelving border of Poupart's inferiorly with wrapping  sleeves around the spermatic cord at the internal ring and buttressing this  area with suture. The cord was _____ into the subcutaneous tissue and then  we closed the subcutaneous tissue with 3-0 Vicryl and a running 4-0 Vicryl  suture was used to close the skin. The patient tolerated the procedure  well. LUKE ARIAS [facility], MD    D: 07/17/2018 20:24:20       T: 07/17/2018 20:28:39     [email or web address]  Job#: [id number]     Doc#: [id number]    CC:

## 2018-07-18 NOTE — PROGRESS NOTES
[facility]  INPATIENT OCCUPATIONAL THERAPY  STR ONC MED 5K  DAILY NOTE    Time:  Time In: 1020  Time Out: 1043  Timed Code Treatment Minutes: 23 Minutes  Minutes: 23    Date: 2018  Patient Name: Laure Sims,   Gender: male      Room: [facility]002-A  MRN: [id number]  : 1933  (80 y.o.)  Referring Practitioner: Karen Jones MD  Diagnosis: Abdominal Pain  Additional Pertinent Hx: 80 y.o. male who presents to the Emergency Department for the evaluation of abdominal pain. Patient reports that this abdominal pain began yesterday morning and has significantly worsened since onset, rating it an 8/10 at this time. Patient is also experiencing a headache, nausea, constipation and chest pain. His last bowel movement was 1 days ago. He denies any jaw pain, vision changes, vomiting, weakness/tingling of the extremities, diarrhea, urinary abnormalities, shortness of breath, dizziness or diaphoresis. Patient denies having ever experienced symptoms this severe before. He has not eaten any new foods or noticed any appetite changes.  He has a history of hypertension    Past Medical History:   Diagnosis Date    Hyperlipidemia     Hypertension     Osteoarthritis     PAC (premature atrial contraction)     Paget disease of bone     S/P laparoscopic cholecystectomy 2018    S/P right inguinal hernia repair 2018     Past Surgical History:   Procedure Laterality Date    JOINT REPLACEMENT Bilateral     HIP    NE LAP,CHOLECYSTECTOMY N/A 2018    CHOLECYSTECTOMY LAPAROSCOPIC performed by Mike Woodard MD at [address] [place] [address] Right 2018    Right HERNIA INGUINAL REPAIR performed by Mike Woodard MD at [place] [facility]       Restrictions/Precautions:  Fall Risk, General Precautions    Prior Level of Function:  ADL Assistance: Independent  Homemaking Assistance: Needs assistance (pt does the laundry and his son does the cooking, cleaning and yardwork)  Ambulation Assistance: Independent (no device)  Transfer Assistance: Independent  Additional Comments: Pt reports living with son and wife, independent with BADLs at [facility] with son assisting for most IADL and denies using AD for functional mobility. Per RN, son reports pt only ambulates short distances at baseline and does shuffle feet. Patient states that he last fell a couple months ago when he \"just went down. \"  Patient states that he sleeps in a regular, flat bed at home and that he normally does not have any trouble getting into/out of bed.     Subjective  Subjective: Pt. supine in bed upon arrival, agreeable to OT treatment    Pain:  Pain Assessment  Patient Currently in Pain: Denies    Objective  ADL  Grooming: Supervision (washing face)  UE Bathing: Stand by assistance;Minimal assistance (assist with back)  LE Bathing: Contact guard assistance (standing for bottom seated to wash rachel)  UE Dressing: Minimal assistance (mandeep/doff gown)  Toileting: Contact guard assistance (attempted to have BM)     Bed mobility  Supine to Sit: Stand by assistance  Sit to Supine: Stand by assistance  Scooting: Stand by assistance    Transfers  Sit to stand: Contact guard assistance  Stand to sit: Contact guard assistance  Toilet Transfers  Toilet - Technique: Ambulating  Equipment Used: Standard toilet  Toilet Transfer: Contact guard assistance  Toilet Transfers Comments:  cues for safe approach and tehcnique    Balance  Sitting Balance: Stand by assistance  Standing Balance: Contact guard assistance     Activity: in prep to ambulate     Functional Mobility  Functional - Mobility Device: Rolling Walker  Activity: To/from bathroom  Assist Level: Contact guard assistance     Activity Tolerance:  Activity Tolerance: Patient Tolerated treatment well    Assessment:  Performance deficits / Impairments: Decreased functional mobility , Decreased ADL status, Decreased safe awareness, Decreased endurance, Decreased balance,

## 2018-07-18 NOTE — CARE COORDINATION
7/17/18, 12:00 PM      Temitope Ped day: 3  Location: -02/002-A Reason for admit: Abdominal pain [R10.9]  Cholecystitis [K81.9]   Procedure:7/16/18 stress test-not suggestive for ischemia. 7/16/18 Laparoscopic Cholecystectomy and right inguinal hernia repair. Treatment Plan of Care: Hospitalist, Cardiology, and Urology following, PT/OT, IV fluids, IV Cipro, electrolyte replacement protocol, prn Tylenol, Percocet or Morphine for pain, electrolyte replacement protocol, BMP and H/H in a.m., oxygen, telemetry, discharge planning. PCP: Henrietta Holley MD  Readmission Risk Score: 14%  Discharge Plan: Home with spouse and new referral to [facility].
7/18/18, 11:09 AM    DISCHARGE BARRIERS    Spoke with Zara at [facility] and advised her of patient discharge for today. AVS, home health order and face sheet faxed to facility. Spoke with patient and spouse and advised them that agency has been notified and appropriate information faxed. No other questions at this time. 7/18/18, 12:11 PM    Discharge plan discussed by  and . Discharge plan reviewed with patient/ family. Patient/ family verbalize understanding of discharge plan and are in agreement with plan. Understanding was demonstrated using the teach back method.    Services After Discharge  [facility]   IMM Letter  IMM Letter given to Patient/Family/Significant other/Guardian/POA/by[de-identified] Updated  IMM Letter date given[de-identified] 07/18/18  IMM Letter time given[de-identified] 6365
Discharge orders on chart.  Met with Shankar Hernandez and his wife present at bedside
participate in local refill/ meds to beds program?  N/A  Type of Home Care Services:  None  Patient expects to be discharged to:  Home with wife and son  Expected Discharge date:  07/18/18  Follow Up Appointment: Best Day/ Time: Monday AM (any morning)  Discharge Plan: Cristela Ruano is from home with his wife. He is off the unit for testing and no family members are present. Will follow-up with later in the day.     Evaluation: no

## 2018-07-18 NOTE — DISCHARGE SUMMARY
tolerate pain with oral analgesic and was medically stable, he will be discharged home on flomax and jane cath and will follow up with urology in one week. Procedures:  DATE OF PROCEDURE:  07/17/2018     PREOPERATIVE DIAGNOSES:  1. Acute cholecystitis. 2.  Right inguinal hernia.     POSTOPERATIVE DIAGNOSES:  1. Acute cholecystitis. 2.  Sliding right inguinal hernia.     OPERATION:  1. Laparoscopic cholecystectomy. 2.  Right inguinal hernia repair with Ventrio 8 x 12 cm mesh  preperitoneally and 1 x 4 mesh onlay.     SURGEON:  Asuncion Stauffer MD     ANESTHESIA:  General.     COMPLICATIONS:  None.     INDICATION FOR PROCEDURE:  The patient is an 80-year-old white male who  presented with signs and symptoms of acute cholecystitis. The patient's  ultrasound and CT did not definitively reveal acute digna, but he had pain  in the right upper quadrant. CT did reveal a large tumefactive sludge ball  in the gallbladder. He also had a right inguinal hernia, for which he was  unaware of and he is brought to surgery for cholecystectomy and right  inguinal hernia repair.     FINDINGS:  The patient clearly had acute cholecystitis. On the right  inguinal hernia, he had definitive sliding hernia made up of what appeared  to be small bowel. It was repaired with a bilevel placement of mesh.     PROCEDURE:  The patient was brought to the operating suite, placed supine  on the operating room table. After adequate inhalational anesthesia was  administered, the patient's abdomen was prepped and draped in usual sterile  fashion. An incision was made below the umbilicus. A Veress needle was  placed and CO2 was insufflated to a pressure of 15. Veress needle was  removed. A trocar was placed and a camera was placed through the trocar  verifying intraabdominal placement of trocar. The other three trocars, one  in the epigastrium and two laterally placed.   The liver was visualized, was  normal and then the dome of the medially, conjoint  tendon superiorly and shelving border of Poupart's inferiorly with wrapping  sleeves around the spermatic cord at the internal ring and buttressing this  area with suture. The cord was _____ into the subcutaneous tissue and then  we closed the subcutaneous tissue with 3-0 Vicryl and a running 4-0 Vicryl  suture was used to close the skin. The patient tolerated the procedure  well.      Stress Test      Code Status: Full Code     Consults:   Cardiology, medicine and urology    Examination:  Vitals:  Vitals:    07/17/18 1521 07/17/18 2000 07/18/18 0450 07/18/18 0808   BP: (!) 148/78 135/87 136/87 126/77   Pulse: 106 115 102 100   Resp: 18 16 16 16   Temp: 98.1 °F (36.7 °C) 97.8 °F (36.6 °C) 98.6 °F (37 °C) 98.2 °F (36.8 °C)   TempSrc: Oral Oral Oral Oral   SpO2: 98% 95% 96% 96%   Weight:       Height:         Weight: Weight: 193 lb 4.8 oz (87.7 kg)     24 hour intake/output:    Intake/Output Summary (Last 24 hours) at 07/18/18 1202  Last data filed at 07/18/18 2334   Gross per 24 hour   Intake             3154 ml   Output             4200 ml   Net            -1046 ml       General appearance - oriented to person, place, and time  Chest - clear to auscultation, no wheezes, rales or rhonchi, symmetric air entry  Heart - normal rate and regular rhythm  Abdomen - tenderness noted as expected, soft, BS active, non distended  Neurological - alert, oriented, normal speech, no focal findings or movement disorder noted  Extremities - peripheral pulses normal, no pedal edema, no clubbing or cyanosis  Skin - normal coloration and turgor, no rashes, no suspicious skin lesions noted    Significant Diagnostics:   Radiology: Xr Chest Standard (2 Vw)    Result Date: 7/14/2018  PROCEDURE: XR CHEST (2 VW) CLINICAL INFORMATION: Abdominal pain. Headaches disease. Hypertension. COMPARISON: 1/27/2015 TECHNIQUE: PA and lateral views of the chest were obtained. Normal chest. No acute findings.  **This report has been Additional Contrast? Radiologist Recommendation    Result Date: 7/14/2018  CT ABDOMEN AND PELVIS WITH CONTRAST ENHANCEMENT: CLINICAL INFORMATION: Abdominal pain COMPARISON: No prior study. TECHNIQUE: Multiple axial 5 mm images of the abdomen, pelvis, and lung bases were obtained following the administration of oral and intravenous contrast material (ISOVUE). Computer generated sagittal and coronal images of the abdomen and pelvis were also  reconstructed. ALL CT SCANS AT THIS FACILITY use dose modulation, iterative reconstruction, and/or weight-based dosing when appropriate to reduce radiation dose to as low as reasonably achievable. 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 No acute infiltrates or effusions are seen. Liver: Liver is relatively small in size. Cannot exclude cirrhosis. No liver lesions are identified. No acute finding involving the gallbladder, however, there is a small amount of radiodense material within the gallbladder suggestive of milk of calcium bile and possibly a few tiny gallstones. Pancreas, spleen and adrenal glands: Pancreas is atrophic in appearance. Mild splenomegaly. Small 13 mm nodule right adrenal gland. 2 adjacent 13 mm nodules left adrenal gland, likely representing adenomas. Kidneys:  Small benign-appearing 16 mm cyst right kidney. Kidneys otherwise unremarkable. No acute findings. No hydronephrosis. No renal calculi. Bowel/Peritoneum: No abnormally dilated bowel loops are seen. There are findings of constipation. There are a few scattered diverticuli in the colon but no evidence for diverticulitis. The region of the appendix is unremarkable. No free air. No free fluid in the abdomen. No abnormally enlarged para-aortic lymph nodes are seen. Bones : Mild scoliotic curvatures in the lumbar spine. Marked disc space narrowing and degenerative disc disease L3-4 level. Moderate disc space narrowing L2-3 and L4-5 levels. Mild degenerative disc disease L5-S1 level. Suggestion of a small herniated degenerated disc fragment L3-4 level posteriorly midline with no significant indentation of the thecal sac. Bilateral hip prostheses. Relatively coarsened trabecular pattern involving the left iliac bone, suggestive of Paget's disease. Pelvis: Urinary bladder unremarkable. Moderately enlarged prostate gland. Large right inguinal hernia contains a few bowel loops. Moderately enlarged prostate gland. 1. Large right inguinal hernia contains a few bowel loops. These are not incarcerated, however. No evidence for bowel obstruction. 2. Findings of constipation. Large prostate gland. Diverticulosis of the colon. No evidence for diverticulitis. 3. Findings suggestive of milk of calcium bile in the gallbladder possibly a few tiny gallstones. No acute findings involving the gallbladder. 4.. Fibroemphysematous lungs. Bilateral renal nodules. Liver relatively small in size. Cannot assess for cirrhosis. Mild splenomegaly. **This report has been created using voice recognition software. It may contain minor errors which are inherent in voice recognition technology. ** Final report electronically signed by Dr. Marian Henry on 7/14/2018 11:11 AM    Us Gallbladder Ruq    Result Date: 7/14/2018  PROCEDURE: US GALLBLADDER RUQ CLINICAL INFORMATION: Cholelithiasis. COMPARISON: No prior study. TECHNIQUE: Routine US examination of the gallbladder. TECHNICAL DATA: Gallbladder - 8.66 x 4.59 x 4.56 cm Gallbladder Wall - 0.21 cm Common Duct - 0.61 cm FINDINGS: GALLBLADDER: 1. The gallbladder is mildly prominent. 2. The gallbladder wall thickness is normal. 3. There is an irregular hypodense masslike lesion within the gallbladder measuring 3.6 x 1.9 x 2.9 cm which most likely represents tumefactive sludge. There is no associated vascularity and a gallbladder mass is felt to be less likely although not entirely excluded. There are no mobile shadowing gallstones. 4. There is no pericholecystic fluid.  5. There Range    Alb 3.2 (L) 3.5 - 5.1 g/dL    Total Bilirubin 1.2 0.3 - 1.2 mg/dL    Bilirubin, Direct 0.5 (H) 0.0 - 0.3 mg/dL    Alkaline Phosphatase 170 (H) 38 - 126 U/L    AST 26 5 - 40 U/L    ALT 18 11 - 66 U/L    Total Protein 6.8 6.1 - 8.0 g/dL   Anion Gap    Collection Time: 07/17/18  5:22 AM   Result Value Ref Range    Anion Gap 14.0 8.0 - 16.0 meq/L   Glomerular Filtration Rate, Estimated    Collection Time: 07/17/18  5:22 AM   Result Value Ref Range    Est, Glom Filt Rate 58 (A) ml/min/1.73m2   Hemoglobin and Hematocrit, Blood    Collection Time: 07/18/18  6:34 AM   Result Value Ref Range    Hemoglobin 11.7 (L) 14.0 - 18.0 gm/dl    Hematocrit 34.5 (L) 42.0 - 52.0 %   Basic Metabolic Panel    Collection Time: 07/18/18  6:34 AM   Result Value Ref Range    Sodium 137 135 - 145 meq/L    Potassium 3.9 3.5 - 5.2 meq/L    Chloride 102 98 - 111 meq/L    CO2 21 (L) 23 - 33 meq/L    Glucose 99 70 - 108 mg/dL    BUN 19 7 - 22 mg/dL    CREATININE 1.3 (H) 0.4 - 1.2 mg/dL    Calcium 8.3 (L) 8.5 - 10.5 mg/dL   Anion Gap    Collection Time: 07/18/18  6:34 AM   Result Value Ref Range    Anion Gap 14.0 8.0 - 16.0 meq/L   Glomerular Filtration Rate, Estimated    Collection Time: 07/18/18  6:34 AM   Result Value Ref Range    Est, Glom Filt Rate 52 (A) ml/min/1.73m2       Patient Instructions:    DR. Mable Ogden    Pt Name: Marty Cervantes Record Number: [id number]  Today's Date: 7/18/2018        ACTIVITY INSTRUCTIONS:  Ambulate 4 times a day for approximately 10-15  minutes each time     You may resume normal activity tomorrow. Do not engage in strenuous activity that may place stress on your incision.     You may drive when no longer taking pain medication and you are able to comfortably use the gas/brake pedal. Do not drive long distance, in town driving recommended    avoid heavy lifting, tugging, pullings greater than 10-15 lbs for 6 weeks postoperatively, or until released by Physician/CNP      DIET INSTRUCTIONS:  Normal at home diet    MEDICATIONS  You may resume your daily prescription medication schedule unless otherwise specified. Pain medication at discharge - use only as prescribed- refills may be available to you at your follow up appointments if needed and warranted. Narcotics should be used for only short term and we highly encouraged our patients to wean off appropriately and use other means for pain such as non pharmacologic measures and over the counter tylenol or ibuprofen if no restrictions apply. We do  know that surgical pain is real and will not hesitate to help eliminate some of your discomfort. However we will not be able to completely make you pain free and it is important to determine what pain level is tolerable for you     Narcotics cause constipation and we recommend taking a colace daily and Miralax if needed to help reduce the risk of constipation    Increasing water intake if no restrictions will also help eliminate constipation    Ambulation 4 times a day 15 minute each time will help reduce pain each day and help relieve constipation      WOUND/DRESSING INSTRUCTIONS:  Always ensure you and your care giver clean hands before and after caring for the  wound. Keep dressing clean and dry, Change when soiled or wet. Leave incision open to air if not draining   Allow steri-strips to fall off on their own. Ice operative site for 20 minutes 4 times a day as needed     May wash over incision in shower daily,  but do not soak in a bath. ABDOMINAL/LAPAROSCOPIC SURGERY  [x]You are encouraged to get up and move around as this helps with the circulation and speeds up the healing process. [x]Breath deeply and cough from time to time. This helps to clear your lungs and helps prevent pneumonia. [x]Supporting your incision with a pillow or your hand helps to minimize discomfort and pain.   [x]Laparoscopic patients may develop shoulder pain in the first 48 hours from the gas used · Try to walk each day. Start out by walking a little more than you did the day before. Gradually increase the amount you walk. Walking boosts blood flow and helps prevent pneumonia and constipation.     · For about 2 to 4 weeks, avoid lifting anything that would make you strain. This may include a child, heavy grocery bags and milk containers, a heavy briefcase or backpack, cat litter or dog food bags, or a vacuum .     · Avoid strenuous activities, such as biking, jogging, weightlifting, and aerobic exercise, until your doctor says it is okay.     · You may shower 24 to 48 hours after surgery, if your doctor okays it. Pat the cut (incision) dry. Do not take a bath for the first 2 weeks, or until your doctor tells you it is okay.     · You may drive when you are no longer taking pain medicine and can quickly move your foot from the gas pedal to the brake. You must also be able to sit comfortably for a long period of time, even if you do not plan to go far. You might get caught in traffic.     · For a laparoscopic surgery, most people can go back to work or their normal routine in 1 to 2 weeks, but it may take longer. For an open surgery, it will probably take 4 to 6 weeks before you get back to your normal routine.     · Your doctor will tell you when you can have sex again.    Diet    · Eat smaller meals more often instead of fewer larger meals. You can eat a normal diet, but avoid eating fatty foods for about 1 month. Fatty foods include hamburger, whole milk, cheese, and many snack foods. If your stomach is upset, try bland, low-fat foods like plain rice, broiled chicken, toast, and yogurt.     · Drink plenty of fluids (unless your doctor tells you not to).   · If you have diarrhea, try avoiding spicy foods, dairy products, fatty foods, and alcohol. You can also watch to see if specific foods cause it, and stop eating them.  If the diarrhea continues for more than 2 weeks, talk to your doctor.     · You takes them out. This is usually in 7 to 10 days.     · Keep the area clean and dry. You may cover it with a gauze bandage if it weeps or rubs against clothing. Change the bandage every day.    Ice    · To reduce swelling and pain, put ice or a cold pack on your belly for 10 to 20 minutes at a time. Do this every 1 to 2 hours. Put a thin cloth between the ice and your skin. Follow-up care is a key part of your treatment and safety. Be sure to make and go to all appointments, and call your doctor if you are having problems. It's also a good idea to know your test results and keep a list of the medicines you take. When should you call for help? Call 911 anytime you think you may need emergency care. For example, call if:    · You passed out (lost consciousness).     · You are short of breath. Malu Racer your doctor now or seek immediate medical care if:    · You are sick to your stomach and cannot drink fluids.     · You have pain that does not get better when you take your pain medicine.     · You cannot pass stools or gas.     · You have signs of infection, such as:  ¨ Increased pain, swelling, warmth, or redness. ¨ Red streaks leading from the incision. ¨ Pus draining from the incision. ¨ A fever.     · Bright red blood has soaked through the bandage over your incision.     · You have loose stitches, or your incision comes open.     · You have signs of a blood clot in your leg (called a deep vein thrombosis), such as:  ¨ Pain in your calf, back of knee, thigh, or groin. ¨ Redness and swelling in your leg or groin.    Watch closely for any changes in your health, and be sure to contact your doctor if you have any problems. Where can you learn more? Go to https://TripletPluspeRunTitle.Coquelux. org and sign in to your [employer] account. Enter 676 07 809 in the U.Gene.us box to learn more about \"Cholecystectomy: What to Expect at Home. \"     If you do not have an account, please click on the \"Sign Up Now\" and can quickly move your foot from the gas pedal to the brake. You must also be able to sit comfortably for a long period of time, even if you do not plan to go far. You might get caught in traffic.     · Most people are able to return to work within 1 to 2 weeks after surgery.     · You may shower 24 to 48 hours after surgery, if your doctor okays it. Pat the cut (incision) dry. Do not take a bath for the first 2 weeks, or until your doctor tells you it is okay.     · Your doctor will tell you when you can have sex again. Diet    · You can eat your normal diet. If your stomach is upset, try bland, low-fat foods like plain rice, broiled chicken, toast, and yogurt.     · Drink plenty of fluids (unless your doctor tells you not to).     · You may notice that your bowel movements are not regular right after your surgery. This is common. Avoid constipation and straining with bowel movements. You may want to take a fiber supplement every day. If you have not had a bowel movement after a couple of days, ask your doctor about taking a mild laxative. Medicines    · Your doctor will tell you if and when you can restart your medicines. He or she will also give you instructions about taking any new medicines.     · If you take blood thinners, such as warfarin (Coumadin), clopidogrel (Plavix), or aspirin, be sure to talk to your doctor. He or she will tell you if and when to start taking those medicines again. Make sure that you understand exactly what your doctor wants you to do.     · Be safe with medicines. Take pain medicines exactly as directed. ¨ If the doctor gave you a prescription medicine for pain, take it as prescribed. ¨ If you are not taking a prescription pain medicine, take an over-the-counter medicine such as acetaminophen (Tylenol), ibuprofen (Advil, Motrin), or naproxen (Aleve). Read and follow all instructions on the label.   ¨ Do not take two or more pain medicines at the same time unless the doctor told you to. Many pain medicines have acetaminophen, which is Tylenol. Too much acetaminophen (Tylenol) can be harmful.     · If your doctor prescribed antibiotics, take them as directed. Do not stop taking them just because you feel better. You need to take the full course of antibiotics.     · If you think your pain medicine is making you sick to your stomach:  ¨ Take your medicine after meals (unless your doctor has told you not to). ¨ Ask your doctor for a different pain medicine. Incision care    · If you have strips of tape on the cut (incision) the doctor made, leave the tape on for a week or until it falls off.     · If you have staples closing the cut, you will need to visit your doctor in 1 to 2 weeks to have them removed.     · Wash the area daily with warm, soapy water and pat it dry. Follow-up care is a key part of your treatment and safety. Be sure to make and go to all appointments, and call your doctor if you are having problems. It's also a good idea to know your test results and keep a list of the medicines you take. When should you call for help? Call 911 anytime you think you may need emergency care. For example, call if:    · You passed out (lost consciousness).     · You are short of breath.    Call your doctor now or seek immediate medical care if:    · You have pain that does not get better after you take pain medicine.     · You are sick to your stomach and cannot keep fluids down.     · You have signs of a blood clot in your leg (called a deep vein thrombosis), such as:  ¨ Pain in your calf, back of the knee, thigh, or groin. ¨ Redness and swelling in your leg or groin.     · You cannot pass stools or gas.     · Bright red blood has soaked through the bandage over your incision.     · You have loose stitches, or your incision comes open.     · You have signs of infection, such as:  ¨ Increased pain, swelling, warmth, or redness. ¨ Red streaks leading from the incision.   ¨ Pus draining from the incision. ¨ A fever.    Watch closely for any changes in your health, and be sure to contact your doctor if you have any problems. Where can you learn more? Go to https://XL HybridspeSatin Technologieseb.Mobile2Me. org and sign in to your [employer] account. Enter P667 in the Located within [facility] box to learn more about \"Hernia Repair: What to Expect at Home. \"     If you do not have an account, please click on the \"Sign Up Now\" link. Current as of: May 12, 2017  Content Version: 11.6  © 6327-7491 [employer], [employer]. Care instructions adapted under license by [facility] ([facility]). If you have questions about a medical condition or this instruction, always ask your healthcare professional. [place] [address] any warranty or liability for your use of this information. Diet: DIET GENERAL;      Follow-up visits:   Omar Fitzgerald MD  [address] [place] [address],[address]  [facility] [address]  [address] [place] [address]  [phone]          Stephanie Javier, [address] [place] [address]    In 1 week  Hosp fu, urinary retention, fill & [address] [facility], APRN - CNP  [address] [facility] [address]  [phone]    On 7/31/2018  2:30    Clara Del Rio MD  [place] [id number]    On 7/24/2018  [address] [place] [address] MD Jorje  [address] [place] [address],[address]  [facility]  [phone]    On 7/24/2018  1145       Discharge condition: fair  Disposition: Home  Time spent on discharge: 35 minutes    Discharge Medications:   [facility] Medication Instructions OPR:[id number]    Printed on:07/18/18 1202   Medication Information                      amLODIPine (NORVASC) 10 MG tablet  TAKE 1 TABLET EVERY DAY             ibuprofen (ADVIL;MOTRIN) 600 MG tablet  Take 1 tablet by mouth every 6 hours as needed for Pain             lisinopril (PRINIVIL;ZESTRIL) 10 MG tablet  TAKE 1 TABLET EVERY DAY             oxyCODONE-acetaminophen (PERCOCET)

## 2018-07-19 ENCOUNTER — TELEPHONE (OUTPATIENT)
Dept: FAMILY MEDICINE CLINIC | Age: 83
End: 2018-07-19

## 2018-07-19 NOTE — TELEPHONE ENCOUNTER
Will 45 Transitions Initial Follow Up Call    Outreach made within 2 business days of discharge: Yes    Patient: Amanda Estrada Patient : [id number]   MRN: [id number]  Reason for Admission: There are no discharge diagnoses documented for the most recent discharge. Discharge Date: 18       Spoke with: Art    Discharge department/facility: [facility]    TCM Interactive Patient Contact:  Was patient able to fill all prescriptions: Yes  Was patient instructed to bring all medications to the follow-up visit: Yes  Is patient taking all medications as directed in the discharge summary? Yes  Does patient understand their discharge instructions: Yes  Does patient have questions or concerns that need addressed prior to 7-14 day follow up office visit: yes - Not a question, but he is having pain from the hernia repair site. He did get a RX for pain medication and had just taken one when I called. No further requests at this time.     Scheduled appointment with PCP within 7-14 days    Follow Up  Future Appointments  Date Time Provider [place]   2018 9:30 AM Claudio Newton Urology [facility] [[email or web address] - Essence Baig   2018 11:45 AM MD VESNA Peterson - [place]   2018 2:30 PM SEAMUS Voss - CNP Adv Surg [facility] [[email or web address] - Essence Don   2018 10:15 AM MD VESNA Peterson [facility] [[email or web address]] Mar Jacobo RN

## 2018-07-24 ENCOUNTER — NURSE ONLY (OUTPATIENT)
Dept: UROLOGY | Age: 83
End: 2018-07-24

## 2018-07-24 ENCOUNTER — HOSPITAL ENCOUNTER (EMERGENCY)
Age: 83
Discharge: HOME | End: 2018-07-24
Attending: NURSE PRACTITIONER
Payer: MEDICARE

## 2018-07-24 ENCOUNTER — OFFICE VISIT (OUTPATIENT)
Dept: FAMILY MEDICINE CLINIC | Age: 83
End: 2018-07-24
Payer: MEDICARE

## 2018-07-24 VITALS
OXYGEN SATURATION: 96 % | WEIGHT: 195 LBS | DIASTOLIC BLOOD PRESSURE: 86 MMHG | BODY MASS INDEX: 26.41 KG/M2 | RESPIRATION RATE: 20 BRPM | HEIGHT: 72 IN | HEART RATE: 92 BPM | TEMPERATURE: 97.8 F | SYSTOLIC BLOOD PRESSURE: 143 MMHG

## 2018-07-24 VITALS
DIASTOLIC BLOOD PRESSURE: 72 MMHG | SYSTOLIC BLOOD PRESSURE: 122 MMHG | WEIGHT: 195.4 LBS | BODY MASS INDEX: 26.47 KG/M2 | HEIGHT: 72 IN

## 2018-07-24 DIAGNOSIS — R33.9 URINARY RETENTION: ICD-10-CM

## 2018-07-24 DIAGNOSIS — R33.9 URINARY RETENTION: Primary | ICD-10-CM

## 2018-07-24 DIAGNOSIS — K59.00 CONSTIPATION, UNSPECIFIED CONSTIPATION TYPE: ICD-10-CM

## 2018-07-24 DIAGNOSIS — K81.9 CHOLECYSTITIS: Primary | ICD-10-CM

## 2018-07-24 PROCEDURE — 1111F DSCHRG MED/CURRENT MED MERGE: CPT | Performed by: FAMILY MEDICINE

## 2018-07-24 PROCEDURE — 99999 PR OFFICE/OUTPT VISIT,PROCEDURE ONLY: CPT | Performed by: NURSE PRACTITIONER

## 2018-07-24 PROCEDURE — 99213 OFFICE O/P EST LOW 20 MIN: CPT

## 2018-07-24 PROCEDURE — 99213 OFFICE O/P EST LOW 20 MIN: CPT | Performed by: NURSE PRACTITIONER

## 2018-07-24 PROCEDURE — 99495 TRANSJ CARE MGMT MOD F2F 14D: CPT | Performed by: FAMILY MEDICINE

## 2018-07-24 PROCEDURE — 2709999900 HC NON-CHARGEABLE SUPPLY

## 2018-07-24 ASSESSMENT — PAIN SCALES - GENERAL: PAINLEVEL_OUTOF10: 2

## 2018-07-24 ASSESSMENT — PAIN DESCRIPTION - PAIN TYPE: TYPE: ACUTE PAIN

## 2018-07-24 ASSESSMENT — ENCOUNTER SYMPTOMS
ABDOMINAL PAIN: 0
GASTROINTESTINAL NEGATIVE: 1
RESPIRATORY NEGATIVE: 1
CONSTIPATION: 1
VOMITING: 0
DIARRHEA: 0
NAUSEA: 0
SHORTNESS OF BREATH: 0

## 2018-07-24 ASSESSMENT — PATIENT HEALTH QUESTIONNAIRE - PHQ9
SUM OF ALL RESPONSES TO PHQ9 QUESTIONS 1 & 2: 0
1. LITTLE INTEREST OR PLEASURE IN DOING THINGS: 0
2. FEELING DOWN, DEPRESSED OR HOPELESS: 0
SUM OF ALL RESPONSES TO PHQ QUESTIONS 1-9: 0

## 2018-07-24 ASSESSMENT — PAIN DESCRIPTION - ORIENTATION: ORIENTATION: LOWER

## 2018-07-24 ASSESSMENT — PAIN DESCRIPTION - FREQUENCY: FREQUENCY: CONTINUOUS

## 2018-07-24 ASSESSMENT — PAIN DESCRIPTION - ONSET: ONSET: GRADUAL

## 2018-07-24 ASSESSMENT — PAIN DESCRIPTION - DESCRIPTORS: DESCRIPTORS: PRESSURE

## 2018-07-24 ASSESSMENT — PAIN DESCRIPTION - PROGRESSION: CLINICAL_PROGRESSION: GRADUALLY WORSENING

## 2018-07-24 NOTE — PROGRESS NOTES
Post-Discharge Transitional Care Management Services      Brittany Workman   YOB: 1933    Date of Office Visit:  7/24/2018  Date of Hospital Admission: 7/14/18  Date of Hospital Discharge: 7/18/18  Readmission Risk Score [risk of hospital readmission >=10  medium risk (chance of readmission ~ 12%) >14  high risk (chance of readmission ~18%)]:Readmission Risk Score: 12    Care management risk score Rising risk (score 2-5) and Complex Care (Scores >=6): 1       Patient Active Problem List   Diagnosis    Hyperlipidemia    Osteoarthritis    CRF (chronic renal failure)    Primary osteoarthritis of left hip    HTN (hypertension)    Cholecystitis    Stage 3 chronic kidney disease    Preoperative cardiovascular examination    Hyponatremia    Lactic acidosis    Elevated alkaline phosphatase level    Hyperbilirubinemia    Normocytic anemia    Hypocalcemia    Hypophosphatemia    Unsteady gait    Constipation    Enlarged prostate    Splenomegaly    Urinary retention    Gross hematuria    Mild aortic regurgitation    Mild mitral regurgitation    Mild tricuspid regurgitation    S/P laparoscopic cholecystectomy    S/P right inguinal hernia repair       Allergies   Allergen Reactions    Asa [Aspirin]      bleeding       Medications listed as ordered at the time of discharge from hospital   Aniya PERRIN   Home Medication Instructions JOSEMANUEL:    Printed on:07/24/18 1301   Medication Information                      amLODIPine (NORVASC) 10 MG tablet  TAKE 1 TABLET EVERY DAY             ibuprofen (ADVIL;MOTRIN) 600 MG tablet  Take 1 tablet by mouth every 6 hours as needed for Pain             lisinopril (PRINIVIL;ZESTRIL) 10 MG tablet  TAKE 1 TABLET EVERY DAY             oxyCODONE-acetaminophen (PERCOCET) 5-325 MG per tablet  Take 1 tablet by mouth every 6 hours as needed (Moderate pain, pain scale 4-6) for up to 7 days. .             simvastatin (ZOCOR) 5 MG tablet  TAKE 1 TABLET  NIGHTLY

## 2018-07-24 NOTE — ED NOTES
Unable to pass 16fr jane due to restriction.   Blood noted in end of catheter on removal.  Pt urinated on own immediately after catheter removal.       Sandra Buckner RN  07/24/18 8384

## 2018-07-24 NOTE — ED NOTES
Pt assisted to vehicle with steady gait using walker. Pt and family given discharge instructions, verbalizes understanding.       Mulgueta Padron RN  07/24/18 1790

## 2018-07-25 ENCOUNTER — TELEPHONE (OUTPATIENT)
Dept: UROLOGY | Age: 83
End: 2018-07-25

## 2018-07-26 DIAGNOSIS — I10 ESSENTIAL HYPERTENSION: ICD-10-CM

## 2018-07-26 DIAGNOSIS — E78.5 HYPERLIPIDEMIA, UNSPECIFIED HYPERLIPIDEMIA TYPE: ICD-10-CM

## 2018-07-26 RX ORDER — SIMVASTATIN 5 MG
TABLET ORAL
Qty: 90 TABLET | Refills: 1 | Status: SHIPPED | OUTPATIENT
Start: 2018-07-26 | End: 2018-09-17 | Stop reason: SDUPTHER

## 2018-07-26 RX ORDER — AMLODIPINE BESYLATE 10 MG/1
TABLET ORAL
Qty: 90 TABLET | Refills: 1 | Status: SHIPPED | OUTPATIENT
Start: 2018-07-26 | End: 2018-09-17 | Stop reason: SDUPTHER

## 2018-07-26 RX ORDER — LISINOPRIL 10 MG/1
TABLET ORAL
Qty: 90 TABLET | Refills: 1 | Status: SHIPPED | OUTPATIENT
Start: 2018-07-26 | End: 2018-09-17 | Stop reason: SDUPTHER

## 2018-07-31 ENCOUNTER — OFFICE VISIT (OUTPATIENT)
Dept: SURGERY | Age: 83
End: 2018-07-31

## 2018-07-31 VITALS
HEART RATE: 62 BPM | WEIGHT: 190.4 LBS | RESPIRATION RATE: 16 BRPM | TEMPERATURE: 99 F | SYSTOLIC BLOOD PRESSURE: 120 MMHG | OXYGEN SATURATION: 97 % | DIASTOLIC BLOOD PRESSURE: 68 MMHG | HEIGHT: 72 IN | BODY MASS INDEX: 25.79 KG/M2

## 2018-07-31 DIAGNOSIS — Z98.890 S/P RIGHT INGUINAL HERNIA REPAIR: ICD-10-CM

## 2018-07-31 DIAGNOSIS — Z90.49 S/P LAPAROSCOPIC CHOLECYSTECTOMY: Primary | ICD-10-CM

## 2018-07-31 DIAGNOSIS — Z87.19 S/P RIGHT INGUINAL HERNIA REPAIR: ICD-10-CM

## 2018-07-31 PROCEDURE — 99024 POSTOP FOLLOW-UP VISIT: CPT | Performed by: NURSE PRACTITIONER

## 2018-07-31 ASSESSMENT — ENCOUNTER SYMPTOMS
CHOKING: 0
NAUSEA: 0
COUGH: 0
EYE DISCHARGE: 0
BLOOD IN STOOL: 0
WHEEZING: 0
FACIAL SWELLING: 0
RHINORRHEA: 0
VOICE CHANGE: 0
PHOTOPHOBIA: 0
VOMITING: 0
CHEST TIGHTNESS: 0
ANAL BLEEDING: 0
TROUBLE SWALLOWING: 0
SHORTNESS OF BREATH: 0
RECTAL PAIN: 0
DIARRHEA: 0
EYE ITCHING: 0
APNEA: 0
STRIDOR: 0
SINUS PRESSURE: 0
EYE REDNESS: 0
BACK PAIN: 0
CONSTIPATION: 0
ABDOMINAL DISTENTION: 0
EYE PAIN: 0
ABDOMINAL PAIN: 0
SORE THROAT: 0
COLOR CHANGE: 0

## 2018-07-31 NOTE — PROGRESS NOTES
[address] [place] [address] Kassandra Shipman [address]  [address] [address]  Dept: [phone]  Dept Fax: [phone]  Loc: [phone]    Visit Date: 7/31/2018       Nga Mejía is a 80 y.o. male who presents today for:  Chief Complaint   Patient presents with    Post-Op Check     S/p Laparoscopic cholecystectomy. Right inguinal hernia repair with mesh. 7/17/18       HPI:     Edvin Amaya presents today for a 2 week post op check. Today He has no complaints at this time. The incisions look good, Tolerating meals, having bowel movements. Not much surgical pain. He had scrotal swelling and testicular pain that is improved. This is a normal finding after hernia repair. Discussed lifting restrictions patient doesn't do much lifting anymore. Son is present with patient. No follow up needed, Pathology reviewed and discussed.        Past Medical History:   Diagnosis Date    Hyperlipidemia     Hypertension     Osteoarthritis     PAC (premature atrial contraction)     Paget disease of bone     S/P laparoscopic cholecystectomy 7/18/2018    S/P right inguinal hernia repair 7/18/2018      Past Surgical History:   Procedure Laterality Date    JOINT REPLACEMENT Bilateral 2013    HIP    OR LAP,CHOLECYSTECTOMY N/A 7/16/2018    CHOLECYSTECTOMY LAPAROSCOPIC performed by Sara Wong MD at [facility] Right 7/16/2018    Right HERNIA INGUINAL REPAIR performed by Sara Wong MD at [address] [place] [address] History   Problem Relation Age of Onset    Diabetes Mother     Stroke Father     Stroke Sister     Cancer Brother     Heart Disease Brother     Stroke Brother      Social History   Substance Use Topics    Smoking status: Never Smoker    Smokeless tobacco: Never Used    Alcohol use No        Current Outpatient Prescriptions   Medication Sig Dispense Refill    simvastatin (ZOCOR) 5 MG tablet TAKE 1 TABLET  NIGHTLY 90 tablet 1  lisinopril (PRINIVIL;ZESTRIL) 10 MG tablet TAKE 1 TABLET EVERY DAY 90 tablet 1    amLODIPine (NORVASC) 10 MG tablet TAKE 1 TABLET EVERY DAY 90 tablet 1    tamsulosin (FLOMAX) 0.4 MG capsule Take 1 capsule by mouth 2 times daily 30 capsule 3    ibuprofen (ADVIL;MOTRIN) 600 MG tablet Take 1 tablet by mouth every 6 hours as needed for Pain 21 tablet 0     No current facility-administered medications for this visit. Allergies   Allergen Reactions    Asa [Aspirin]      bleeding       Subjective:      Review of Systems   Constitutional: Negative for activity change, appetite change, chills, diaphoresis, fatigue, fever and unexpected weight change. HENT: Negative for congestion, dental problem, drooling, ear discharge, ear pain, facial swelling, hearing loss, mouth sores, nosebleeds, postnasal drip, rhinorrhea, sinus pressure, sneezing, sore throat, tinnitus, trouble swallowing and voice change. Eyes: Negative for photophobia, pain, discharge, redness, itching and visual disturbance. Respiratory: Negative for apnea, cough, choking, chest tightness, shortness of breath, wheezing and stridor. Cardiovascular: Negative for chest pain, palpitations and leg swelling. Gastrointestinal: Negative for abdominal distention, abdominal pain, anal bleeding, blood in stool, constipation, diarrhea, nausea, rectal pain and vomiting. Endocrine: Negative for cold intolerance, heat intolerance, polydipsia, polyphagia and polyuria. Genitourinary: Positive for scrotal swelling and testicular pain. Negative for decreased urine volume, difficulty urinating, dysuria, enuresis, flank pain, frequency, genital sores, hematuria and urgency. Musculoskeletal: Negative for arthralgias, back pain, gait problem, joint swelling, myalgias, neck pain and neck stiffness. Skin: Negative for color change, pallor, rash and wound.    Allergic/Immunologic: Negative for environmental allergies, food allergies and immunocompromised Fever over 101 degrees by mouth   Increased redness, warmth, hardness at operative site   Blood soaked dressing (small amounts of oozing may be normal)   Increased or progressive drainage from the surgical area   Inability to urinate or blood in the urine   Pain not relieved by the medications ordered   Persistent nausea and/or vomiting, unable to retain fluids   Pain or swelling in your legs   Shortness of breath or chest pain    8. Signs and symptoms have been reviewed with patient that would be concerning and need her to return to office for re-evaluation. Patient states he will call if she has questions or concerns. Discussed use, benefit, and side effects of prescribed medications. All patient questions answered. Pt voiced understanding.      Electronically signed by SEAMUS Branch CNP on 8/6/2018 at 2:39 PM

## 2018-09-04 ENCOUNTER — OFFICE VISIT (OUTPATIENT)
Dept: UROLOGY | Age: 83
End: 2018-09-04
Payer: MEDICARE

## 2018-09-04 VITALS
DIASTOLIC BLOOD PRESSURE: 80 MMHG | WEIGHT: 194 LBS | BODY MASS INDEX: 27.16 KG/M2 | SYSTOLIC BLOOD PRESSURE: 138 MMHG | HEIGHT: 71 IN

## 2018-09-04 DIAGNOSIS — N40.0 ENLARGED PROSTATE: ICD-10-CM

## 2018-09-04 DIAGNOSIS — R33.9 URINARY RETENTION: Primary | ICD-10-CM

## 2018-09-04 LAB
BILIRUBIN, POC: NORMAL
BLOOD URINE, POC: NORMAL
CLARITY, POC: NORMAL
COLOR, POC: NORMAL
GLUCOSE URINE, POC: NORMAL
KETONES, POC: NORMAL
LEUKOCYTE EST, POC: NORMAL
NITRITE, POC: NORMAL
PH, POC: NORMAL
POST VOID RESIDUAL (PVR): 231 ML
PROTEIN, POC: NORMAL
SPECIFIC GRAVITY, POC: NORMAL
UROBILINOGEN, POC: NORMAL

## 2018-09-04 PROCEDURE — 1036F TOBACCO NON-USER: CPT | Performed by: NURSE PRACTITIONER

## 2018-09-04 PROCEDURE — 1101F PT FALLS ASSESS-DOCD LE1/YR: CPT | Performed by: NURSE PRACTITIONER

## 2018-09-04 PROCEDURE — 81002 URINALYSIS NONAUTO W/O SCOPE: CPT | Performed by: NURSE PRACTITIONER

## 2018-09-04 PROCEDURE — 1123F ACP DISCUSS/DSCN MKR DOCD: CPT | Performed by: NURSE PRACTITIONER

## 2018-09-04 PROCEDURE — G8419 CALC BMI OUT NRM PARAM NOF/U: HCPCS | Performed by: NURSE PRACTITIONER

## 2018-09-04 PROCEDURE — 51798 US URINE CAPACITY MEASURE: CPT | Performed by: NURSE PRACTITIONER

## 2018-09-04 PROCEDURE — 99213 OFFICE O/P EST LOW 20 MIN: CPT | Performed by: NURSE PRACTITIONER

## 2018-09-04 PROCEDURE — 4040F PNEUMOC VAC/ADMIN/RCVD: CPT | Performed by: NURSE PRACTITIONER

## 2018-09-04 PROCEDURE — G8427 DOCREV CUR MEDS BY ELIG CLIN: HCPCS | Performed by: NURSE PRACTITIONER

## 2018-09-04 RX ORDER — TAMSULOSIN HYDROCHLORIDE 0.4 MG/1
0.4 CAPSULE ORAL NIGHTLY
Qty: 30 CAPSULE | Refills: 5 | Status: SHIPPED | OUTPATIENT
Start: 2018-09-04 | End: 2018-11-06 | Stop reason: SDUPTHER

## 2018-09-04 NOTE — PROGRESS NOTES
Bladder scan was performed with a PVR of 231ml. Provider was notified.
has never smoked. He has never used smokeless tobacco. He reports that he does not drink alcohol or use drugs. Subjective:     Review of Systems  No problems with ears, nose or throat. No problems with eyes. No chest pain, shortness of breath, abdominal pain, extremity pain or weakness, and no neurological deficits. No rashes.  symptoms per HPI. The remainder of the review of symptoms is negative. Objective:     PE:   Vitals:    09/04/18 1431   BP: 138/80   Weight: 194 lb (88 kg)   Height: 5' 11\" (1.803 m)       Constitutional: Alert and oriented times 3, no acute distress and cooperative to examination with appropriate mood and affect. HENT:   Head:        Normocephalic and atraumatic. Mouth/Throat:         Mucous membranes are normal.   Eyes:         EOM are normal. No scleral icterus. PERRLA. Neck:        Supple, symmetrical, trachea midline  Pulmonary/Chest:      Chest symmetric with normal A/P diameter,Normal respiratory rate and rhthym. No use of accessory muscles. Abdominal:         Soft. No tenderness. Bowel sounds present. Musculoskeletal:         Normal range of motion. No edema or tenderness of lower extremities. Extremities: No cyanosis, clubbing, or edema present. Neurological:        Alert and oriented. No cranial nerve deficit. Carl Quiet Psychiatric:        Normal mood and affect.       Labs   Urine dip demonstrates   Results for POC orders placed in visit on 09/04/18   poct post void residual   Result Value Ref Range    post void residual 231 ml   POCT Urinalysis no Micro   Result Value Ref Range    Color, UA      Clarity, UA      Glucose, UA POC      Bilirubin, UA      Ketones, UA      Spec Grav, UA      Blood, UA POC neg     pH, UA      Protein, UA POC      Urobilinogen, UA      Leukocytes, UA neg     Nitrite, UA neg        Patients recent PSA values are as follows  No results found for: PSA, PSADIA     Recent BUN/Creatinine:  Lab Results   Component Value Date    BUN 19

## 2018-09-17 ENCOUNTER — OFFICE VISIT (OUTPATIENT)
Dept: FAMILY MEDICINE CLINIC | Age: 83
End: 2018-09-17
Payer: MEDICARE

## 2018-09-17 VITALS
HEART RATE: 84 BPM | BODY MASS INDEX: 27.24 KG/M2 | DIASTOLIC BLOOD PRESSURE: 56 MMHG | SYSTOLIC BLOOD PRESSURE: 122 MMHG | WEIGHT: 194.6 LBS | HEIGHT: 71 IN

## 2018-09-17 DIAGNOSIS — I10 ESSENTIAL HYPERTENSION: ICD-10-CM

## 2018-09-17 DIAGNOSIS — E78.5 HYPERLIPIDEMIA, UNSPECIFIED HYPERLIPIDEMIA TYPE: ICD-10-CM

## 2018-09-17 PROCEDURE — 1036F TOBACCO NON-USER: CPT | Performed by: FAMILY MEDICINE

## 2018-09-17 PROCEDURE — 1123F ACP DISCUSS/DSCN MKR DOCD: CPT | Performed by: FAMILY MEDICINE

## 2018-09-17 PROCEDURE — 99213 OFFICE O/P EST LOW 20 MIN: CPT | Performed by: FAMILY MEDICINE

## 2018-09-17 PROCEDURE — 4040F PNEUMOC VAC/ADMIN/RCVD: CPT | Performed by: FAMILY MEDICINE

## 2018-09-17 PROCEDURE — 1101F PT FALLS ASSESS-DOCD LE1/YR: CPT | Performed by: FAMILY MEDICINE

## 2018-09-17 PROCEDURE — G8427 DOCREV CUR MEDS BY ELIG CLIN: HCPCS | Performed by: FAMILY MEDICINE

## 2018-09-17 PROCEDURE — G8419 CALC BMI OUT NRM PARAM NOF/U: HCPCS | Performed by: FAMILY MEDICINE

## 2018-09-17 RX ORDER — LISINOPRIL 10 MG/1
TABLET ORAL
Qty: 90 TABLET | Refills: 1 | Status: SHIPPED | OUTPATIENT
Start: 2018-09-17 | End: 2019-03-04 | Stop reason: SDUPTHER

## 2018-09-17 RX ORDER — SIMVASTATIN 5 MG
TABLET ORAL
Qty: 90 TABLET | Refills: 1 | Status: SHIPPED | OUTPATIENT
Start: 2018-09-17 | End: 2019-03-04 | Stop reason: SDUPTHER

## 2018-09-17 RX ORDER — AMLODIPINE BESYLATE 10 MG/1
TABLET ORAL
Qty: 90 TABLET | Refills: 1 | Status: SHIPPED | OUTPATIENT
Start: 2018-09-17 | End: 2019-03-04 | Stop reason: SDUPTHER

## 2018-09-17 ASSESSMENT — ENCOUNTER SYMPTOMS
ORTHOPNEA: 0
RESPIRATORY NEGATIVE: 1
GASTROINTESTINAL NEGATIVE: 1
SHORTNESS OF BREATH: 0

## 2018-11-01 ENCOUNTER — HOSPITAL ENCOUNTER (OUTPATIENT)
Age: 83
Discharge: HOME | End: 2018-11-01
Payer: MEDICARE

## 2018-11-01 DIAGNOSIS — R33.9 URINARY RETENTION: ICD-10-CM

## 2018-11-01 PROCEDURE — 36415 COLL VENOUS BLD VENIPUNCTURE: CPT

## 2018-11-01 PROCEDURE — 84153 ASSAY OF PSA TOTAL: CPT

## 2018-11-02 LAB — PROSTATE SPECIFIC ANTIGEN: 19.49 NG/ML (ref 0–1)

## 2018-11-06 ENCOUNTER — OFFICE VISIT (OUTPATIENT)
Dept: UROLOGY | Age: 83
End: 2018-11-06
Payer: MEDICARE

## 2018-11-06 VITALS
WEIGHT: 199 LBS | SYSTOLIC BLOOD PRESSURE: 138 MMHG | DIASTOLIC BLOOD PRESSURE: 82 MMHG | BODY MASS INDEX: 26.95 KG/M2 | HEIGHT: 72 IN

## 2018-11-06 DIAGNOSIS — N40.0 BENIGN PROSTATIC HYPERPLASIA, UNSPECIFIED WHETHER LOWER URINARY TRACT SYMPTOMS PRESENT: Primary | ICD-10-CM

## 2018-11-06 DIAGNOSIS — R33.9 INCOMPLETE BLADDER EMPTYING: ICD-10-CM

## 2018-11-06 LAB
BILIRUBIN, POC: NORMAL
BLOOD URINE, POC: NORMAL
CLARITY, POC: NORMAL
COLOR, POC: NORMAL
GLUCOSE URINE, POC: NORMAL
KETONES, POC: NORMAL
LEUKOCYTE EST, POC: NORMAL
NITRITE, POC: NORMAL
PH, POC: NORMAL
POST VOID RESIDUAL (PVR): 73 ML
PROTEIN, POC: NORMAL
SPECIFIC GRAVITY, POC: NORMAL
UROBILINOGEN, POC: NORMAL

## 2018-11-06 PROCEDURE — G8484 FLU IMMUNIZE NO ADMIN: HCPCS | Performed by: NURSE PRACTITIONER

## 2018-11-06 PROCEDURE — G8427 DOCREV CUR MEDS BY ELIG CLIN: HCPCS | Performed by: NURSE PRACTITIONER

## 2018-11-06 PROCEDURE — 4040F PNEUMOC VAC/ADMIN/RCVD: CPT | Performed by: NURSE PRACTITIONER

## 2018-11-06 PROCEDURE — G8419 CALC BMI OUT NRM PARAM NOF/U: HCPCS | Performed by: NURSE PRACTITIONER

## 2018-11-06 PROCEDURE — 99213 OFFICE O/P EST LOW 20 MIN: CPT | Performed by: NURSE PRACTITIONER

## 2018-11-06 PROCEDURE — 81002 URINALYSIS NONAUTO W/O SCOPE: CPT | Performed by: NURSE PRACTITIONER

## 2018-11-06 PROCEDURE — 1036F TOBACCO NON-USER: CPT | Performed by: NURSE PRACTITIONER

## 2018-11-06 PROCEDURE — 1101F PT FALLS ASSESS-DOCD LE1/YR: CPT | Performed by: NURSE PRACTITIONER

## 2018-11-06 PROCEDURE — 51798 US URINE CAPACITY MEASURE: CPT | Performed by: NURSE PRACTITIONER

## 2018-11-06 PROCEDURE — 1123F ACP DISCUSS/DSCN MKR DOCD: CPT | Performed by: NURSE PRACTITIONER

## 2018-11-06 RX ORDER — TAMSULOSIN HYDROCHLORIDE 0.4 MG/1
0.4 CAPSULE ORAL NIGHTLY
Qty: 30 CAPSULE | Refills: 11 | Status: SHIPPED | OUTPATIENT
Start: 2018-11-06 | End: 2019-09-03 | Stop reason: SINTOL

## 2018-11-06 NOTE — PROGRESS NOTES
[facility]  [address] [place]  Dept: [phone]  Loc: [phone]  Visit Date: 11/6/2018      HPI:     Elmer Her f/u today for BPH, Urinary retention. He was a difficult catheter insertion while in the hospital and Dr. Taylor Jones placed catheter. He was discharged home on Flomax, however is no longer taking as no refills. He reports nocturia of 4 times nightly with urinary frequency. He has incomplete bladder emptying. Trend of PSA:  19.49 11/2018. No previous result  No hx of prostate cancer  He comes in today by himself. Hx is obtained from the patient and medical record. Current Outpatient Prescriptions   Medication Sig Dispense Refill    tamsulosin (FLOMAX) 0.4 MG capsule Take 1 capsule by mouth nightly 30 capsule 11    simvastatin (ZOCOR) 5 MG tablet TAKE 1 TABLET  NIGHTLY 90 tablet 1    lisinopril (PRINIVIL;ZESTRIL) 10 MG tablet TAKE 1 TABLET EVERY DAY 90 tablet 1    amLODIPine (NORVASC) 10 MG tablet TAKE 1 TABLET EVERY DAY 90 tablet 1    ibuprofen (ADVIL;MOTRIN) 600 MG tablet Take 1 tablet by mouth every 6 hours as needed for Pain 21 tablet 0     No current facility-administered medications for this visit. Past Medical History  Juan Carlos Ferraro  has a past medical history of Hyperlipidemia; Hypertension; Osteoarthritis; PAC (premature atrial contraction); Paget disease of bone; S/P laparoscopic cholecystectomy; and S/P right inguinal hernia repair. Past Surgical History  The patient  has a past surgical history that includes joint replacement (Bilateral, 2013); pr lap,cholecystectomy (N/A, 7/16/2018); and pr lap,inguinal hernia repr,initial (Right, 7/16/2018). Family History  This patient's family history includes Cancer in his brother; Diabetes in his mother; Heart Disease in his brother; Stroke in his brother, father, and sister. Social History  Juan Carlos Ferraro  reports that he has never smoked.  He has never used smokeless tobacco. He reports that he does not drink alcohol or use drugs. Subjective:     Review of Systems  No problems with ears, nose or throat. No problems with eyes. No chest pain, shortness of breath, abdominal pain, extremity pain or weakness, and no neurological deficits. No rashes.  symptoms per HPI. The remainder of the review of symptoms is negative. Objective:     PE:   Vitals:    11/06/18 1531   BP: 138/82   Weight: 199 lb (90.3 kg)   Height: 6' (1.829 m)       Constitutional: Alert and oriented times 3, no acute distress and cooperative to examination with appropriate mood and affect. HENT:   Head:        Normocephalic and atraumatic. Mouth/Throat:         Mucous membranes are normal.   Eyes:         EOM are normal. No scleral icterus. PERRLA. Neck:        Supple, symmetrical, trachea midline  Pulmonary/Chest:      Chest symmetric with normal A/P diameter,Normal respiratory rate and rhthym. No use of accessory muscles. Abdominal:         Soft. No tenderness. Bowel sounds present. Musculoskeletal:         Normal range of motion. No edema or tenderness of lower extremities. Extremities: No cyanosis, clubbing, or edema present. Neurological:        Alert and oriented. No cranial nerve deficit. Manuel Clark Psychiatric:        Normal mood and affect. Labs   Urine dip demonstrates   No results found for this visit on 11/06/18. Patients recent PSA values are as follows  Lab Results   Component Value Date    PSA 19.49 (H) 11/01/2018        Recent BUN/Creatinine:  Lab Results   Component Value Date    BUN 19 07/18/2018    CREATININE 1.3 07/18/2018       Radiology  No recent imaging       Assessment & Plan:     BPH w/ LUTs  Urinary retention  Elevated PSA    Isela Snyder f/u today for BPH, Urinary retention. He still has nocturia with urinary frequency.  Did not  prescription of Flomax, recommended to restart that as it would benefit him, hopefully prevent retention, improve nocturia. PSA was obtained, was elevated at 19. Today we discussed the role of PSA used in screening for prostate cancer. I explained that we cannot diagnose prostate cancer with an elevated PSA, strictly used as a screening tool to identify men who are risk. Discussed potential biopsy, he wishes not to pursue this in the future. Will plan on not following in the future. Return in about 1 year (around 11/6/2019) for BPH.     SEAMUS Pereira  Urology

## 2018-12-10 ENCOUNTER — TELEPHONE (OUTPATIENT)
Dept: FAMILY MEDICINE CLINIC | Age: 83
End: 2018-12-10

## 2019-03-04 ENCOUNTER — OFFICE VISIT (OUTPATIENT)
Dept: FAMILY MEDICINE CLINIC | Age: 84
End: 2019-03-04
Payer: MEDICARE

## 2019-03-04 VITALS
HEIGHT: 72 IN | DIASTOLIC BLOOD PRESSURE: 72 MMHG | HEART RATE: 88 BPM | WEIGHT: 192.4 LBS | BODY MASS INDEX: 26.06 KG/M2 | SYSTOLIC BLOOD PRESSURE: 122 MMHG

## 2019-03-04 DIAGNOSIS — I10 ESSENTIAL HYPERTENSION: Primary | ICD-10-CM

## 2019-03-04 DIAGNOSIS — E78.5 HYPERLIPIDEMIA, UNSPECIFIED HYPERLIPIDEMIA TYPE: ICD-10-CM

## 2019-03-04 DIAGNOSIS — Z91.81 AT HIGH RISK FOR FALLS: ICD-10-CM

## 2019-03-04 PROCEDURE — 1123F ACP DISCUSS/DSCN MKR DOCD: CPT | Performed by: FAMILY MEDICINE

## 2019-03-04 PROCEDURE — G8427 DOCREV CUR MEDS BY ELIG CLIN: HCPCS | Performed by: FAMILY MEDICINE

## 2019-03-04 PROCEDURE — 4040F PNEUMOC VAC/ADMIN/RCVD: CPT | Performed by: FAMILY MEDICINE

## 2019-03-04 PROCEDURE — 99213 OFFICE O/P EST LOW 20 MIN: CPT | Performed by: FAMILY MEDICINE

## 2019-03-04 PROCEDURE — G8419 CALC BMI OUT NRM PARAM NOF/U: HCPCS | Performed by: FAMILY MEDICINE

## 2019-03-04 PROCEDURE — 1036F TOBACCO NON-USER: CPT | Performed by: FAMILY MEDICINE

## 2019-03-04 PROCEDURE — 1101F PT FALLS ASSESS-DOCD LE1/YR: CPT | Performed by: FAMILY MEDICINE

## 2019-03-04 PROCEDURE — G8484 FLU IMMUNIZE NO ADMIN: HCPCS | Performed by: FAMILY MEDICINE

## 2019-03-04 RX ORDER — SIMVASTATIN 5 MG
TABLET ORAL
Qty: 90 TABLET | Refills: 1 | Status: SHIPPED | OUTPATIENT
Start: 2019-03-04 | End: 2019-09-03 | Stop reason: SDUPTHER

## 2019-03-04 RX ORDER — LISINOPRIL 10 MG/1
TABLET ORAL
Qty: 90 TABLET | Refills: 1 | Status: SHIPPED | OUTPATIENT
Start: 2019-03-04 | End: 2019-09-03 | Stop reason: SDUPTHER

## 2019-03-04 RX ORDER — AMLODIPINE BESYLATE 10 MG/1
TABLET ORAL
Qty: 90 TABLET | Refills: 1 | Status: SHIPPED | OUTPATIENT
Start: 2019-03-04 | End: 2019-09-03 | Stop reason: SDUPTHER

## 2019-03-04 ASSESSMENT — PATIENT HEALTH QUESTIONNAIRE - PHQ9
SUM OF ALL RESPONSES TO PHQ QUESTIONS 1-9: 0
1. LITTLE INTEREST OR PLEASURE IN DOING THINGS: 0
SUM OF ALL RESPONSES TO PHQ9 QUESTIONS 1 & 2: 0
SUM OF ALL RESPONSES TO PHQ QUESTIONS 1-9: 0
2. FEELING DOWN, DEPRESSED OR HOPELESS: 0

## 2019-03-04 ASSESSMENT — ENCOUNTER SYMPTOMS
ORTHOPNEA: 0
RESPIRATORY NEGATIVE: 1
GASTROINTESTINAL NEGATIVE: 1

## 2019-09-03 ENCOUNTER — OFFICE VISIT (OUTPATIENT)
Dept: FAMILY MEDICINE CLINIC | Age: 84
End: 2019-09-03
Payer: MEDICARE

## 2019-09-03 VITALS
BODY MASS INDEX: 26.19 KG/M2 | HEART RATE: 94 BPM | SYSTOLIC BLOOD PRESSURE: 130 MMHG | WEIGHT: 193.4 LBS | HEIGHT: 72 IN | DIASTOLIC BLOOD PRESSURE: 82 MMHG

## 2019-09-03 DIAGNOSIS — E78.5 HYPERLIPIDEMIA, UNSPECIFIED HYPERLIPIDEMIA TYPE: ICD-10-CM

## 2019-09-03 DIAGNOSIS — I10 ESSENTIAL HYPERTENSION: Primary | ICD-10-CM

## 2019-09-03 PROCEDURE — 1123F ACP DISCUSS/DSCN MKR DOCD: CPT | Performed by: FAMILY MEDICINE

## 2019-09-03 PROCEDURE — G8419 CALC BMI OUT NRM PARAM NOF/U: HCPCS | Performed by: FAMILY MEDICINE

## 2019-09-03 PROCEDURE — 99213 OFFICE O/P EST LOW 20 MIN: CPT | Performed by: FAMILY MEDICINE

## 2019-09-03 PROCEDURE — 1036F TOBACCO NON-USER: CPT | Performed by: FAMILY MEDICINE

## 2019-09-03 PROCEDURE — G8427 DOCREV CUR MEDS BY ELIG CLIN: HCPCS | Performed by: FAMILY MEDICINE

## 2019-09-03 PROCEDURE — 4040F PNEUMOC VAC/ADMIN/RCVD: CPT | Performed by: FAMILY MEDICINE

## 2019-09-03 RX ORDER — LISINOPRIL 10 MG/1
TABLET ORAL
Qty: 90 TABLET | Refills: 1 | Status: SHIPPED | OUTPATIENT
Start: 2019-09-03 | End: 2020-03-03 | Stop reason: SDUPTHER

## 2019-09-03 RX ORDER — SIMVASTATIN 5 MG
TABLET ORAL
Qty: 90 TABLET | Refills: 1 | Status: SHIPPED | OUTPATIENT
Start: 2019-09-03 | End: 2020-03-03 | Stop reason: SDUPTHER

## 2019-09-03 RX ORDER — AMLODIPINE BESYLATE 10 MG/1
TABLET ORAL
Qty: 90 TABLET | Refills: 1 | Status: SHIPPED | OUTPATIENT
Start: 2019-09-03 | End: 2020-03-03 | Stop reason: SDUPTHER

## 2019-09-03 ASSESSMENT — ENCOUNTER SYMPTOMS
BACK PAIN: 1
SHORTNESS OF BREATH: 0
RESPIRATORY NEGATIVE: 1
GASTROINTESTINAL NEGATIVE: 1

## 2020-03-03 ENCOUNTER — OFFICE VISIT (OUTPATIENT)
Dept: FAMILY MEDICINE CLINIC | Age: 85
End: 2020-03-03
Payer: MEDICARE

## 2020-03-03 VITALS
DIASTOLIC BLOOD PRESSURE: 80 MMHG | HEIGHT: 72 IN | HEART RATE: 88 BPM | BODY MASS INDEX: 26.03 KG/M2 | WEIGHT: 192.2 LBS | SYSTOLIC BLOOD PRESSURE: 124 MMHG

## 2020-03-03 LAB
ALBUMIN SERPL-MCNC: 3.9 G/DL (ref 3.5–5.1)
ALP BLD-CCNC: 228 U/L (ref 38–126)
ALT SERPL-CCNC: 13 U/L (ref 11–66)
ANION GAP SERPL CALCULATED.3IONS-SCNC: 15 MEQ/L (ref 8–16)
AST SERPL-CCNC: 18 U/L (ref 5–40)
BILIRUB SERPL-MCNC: 0.4 MG/DL (ref 0.3–1.2)
BUN BLDV-MCNC: 22 MG/DL (ref 7–22)
CALCIUM SERPL-MCNC: 9 MG/DL (ref 8.5–10.5)
CHLORIDE BLD-SCNC: 101 MEQ/L (ref 98–111)
CHOLESTEROL, TOTAL: 107 MG/DL (ref 100–199)
CO2: 21 MEQ/L (ref 23–33)
CREAT SERPL-MCNC: 1.5 MG/DL (ref 0.4–1.2)
GFR SERPL CREATININE-BSD FRML MDRD: 44 ML/MIN/1.73M2
GLUCOSE BLD-MCNC: 117 MG/DL (ref 70–108)
HDLC SERPL-MCNC: 37 MG/DL
LDL CHOLESTEROL CALCULATED: 49 MG/DL
POTASSIUM SERPL-SCNC: 4 MEQ/L (ref 3.5–5.2)
SODIUM BLD-SCNC: 137 MEQ/L (ref 135–145)
TOTAL PROTEIN: 7.3 G/DL (ref 6.1–8)
TRIGL SERPL-MCNC: 106 MG/DL (ref 0–199)

## 2020-03-03 PROCEDURE — 1123F ACP DISCUSS/DSCN MKR DOCD: CPT | Performed by: FAMILY MEDICINE

## 2020-03-03 PROCEDURE — G8427 DOCREV CUR MEDS BY ELIG CLIN: HCPCS | Performed by: FAMILY MEDICINE

## 2020-03-03 PROCEDURE — G8417 CALC BMI ABV UP PARAM F/U: HCPCS | Performed by: FAMILY MEDICINE

## 2020-03-03 PROCEDURE — 4040F PNEUMOC VAC/ADMIN/RCVD: CPT | Performed by: FAMILY MEDICINE

## 2020-03-03 PROCEDURE — 99213 OFFICE O/P EST LOW 20 MIN: CPT | Performed by: FAMILY MEDICINE

## 2020-03-03 PROCEDURE — 1036F TOBACCO NON-USER: CPT | Performed by: FAMILY MEDICINE

## 2020-03-03 PROCEDURE — G8484 FLU IMMUNIZE NO ADMIN: HCPCS | Performed by: FAMILY MEDICINE

## 2020-03-03 RX ORDER — AMLODIPINE BESYLATE 10 MG/1
TABLET ORAL
Qty: 90 TABLET | Refills: 3 | Status: SHIPPED | OUTPATIENT
Start: 2020-03-03 | End: 2021-02-26 | Stop reason: SDUPTHER

## 2020-03-03 RX ORDER — SIMVASTATIN 5 MG
TABLET ORAL
Qty: 90 TABLET | Refills: 3 | Status: SHIPPED | OUTPATIENT
Start: 2020-03-03 | End: 2021-02-26 | Stop reason: SDUPTHER

## 2020-03-03 RX ORDER — LISINOPRIL 10 MG/1
TABLET ORAL
Qty: 90 TABLET | Refills: 3 | Status: SHIPPED | OUTPATIENT
Start: 2020-03-03 | End: 2021-02-26 | Stop reason: SDUPTHER

## 2020-03-03 ASSESSMENT — PATIENT HEALTH QUESTIONNAIRE - PHQ9
SUM OF ALL RESPONSES TO PHQ9 QUESTIONS 1 & 2: 0
2. FEELING DOWN, DEPRESSED OR HOPELESS: 0
SUM OF ALL RESPONSES TO PHQ QUESTIONS 1-9: 0
1. LITTLE INTEREST OR PLEASURE IN DOING THINGS: 0
SUM OF ALL RESPONSES TO PHQ QUESTIONS 1-9: 0

## 2020-03-03 ASSESSMENT — ENCOUNTER SYMPTOMS
GASTROINTESTINAL NEGATIVE: 1
ORTHOPNEA: 0
RESPIRATORY NEGATIVE: 1

## 2020-03-03 NOTE — PROGRESS NOTES
[facility]  [address] [facility]  Dept: [phone]  Dept Fax: [phone]: [phone]    Visit Date: 3/3/2020    Troy Kumar is a 80 y.o.male who presents today for:   Chief Complaint   Patient presents with    6 Month Follow-Up    Hyperlipidemia    Hypertension    Labs Only         HPI:      Doing well. Home with wife and son. Low activity but fair ADL's. Hyperlipidemia   This is a chronic problem. The current episode started more than 1 year ago. The problem is controlled. Recent lipid tests were reviewed and are normal. He has no history of chronic renal disease, diabetes or hypothyroidism. Pertinent negatives include no chest pain or myalgias. Current antihyperlipidemic treatment includes statins. The current treatment provides significant improvement of lipids. There are no compliance problems. Risk factors for coronary artery disease include dyslipidemia, family history and male sex. Hypertension   This is a chronic problem. The current episode started more than 1 year ago. The problem has been resolved since onset. The problem is controlled. Pertinent negatives include no chest pain, orthopnea or palpitations. There are no associated agents to hypertension. Risk factors for coronary artery disease include family history and male gender. Past treatments include ACE inhibitors and calcium channel blockers. The current treatment provides significant improvement. There are no compliance problems. There is no history of kidney disease or CVA. There is no history of chronic renal disease or a thyroid problem.        Current Outpatient Medications   Medication Sig Dispense Refill    simvastatin (ZOCOR) 5 MG tablet TAKE 1 TABLET  NIGHTLY 90 tablet 3    amLODIPine (NORVASC) 10 MG tablet TAKE 1 TABLET EVERY DAY 90 tablet 3    lisinopril (PRINIVIL;ZESTRIL) 10 MG tablet TAKE 1 TABLET EVERY DAY 90 tablet 3

## 2020-03-04 ENCOUNTER — TELEPHONE (OUTPATIENT)
Dept: FAMILY MEDICINE CLINIC | Age: 85
End: 2020-03-04

## 2020-09-01 ENCOUNTER — OFFICE VISIT (OUTPATIENT)
Dept: FAMILY MEDICINE CLINIC | Age: 85
End: 2020-09-01
Payer: MEDICARE

## 2020-09-01 ENCOUNTER — HOSPITAL ENCOUNTER (OUTPATIENT)
Age: 85
Discharge: HOME | End: 2020-09-01
Payer: MEDICARE

## 2020-09-01 VITALS
HEART RATE: 62 BPM | TEMPERATURE: 97.1 F | OXYGEN SATURATION: 99 % | WEIGHT: 180.6 LBS | DIASTOLIC BLOOD PRESSURE: 72 MMHG | SYSTOLIC BLOOD PRESSURE: 128 MMHG | HEIGHT: 72 IN | BODY MASS INDEX: 24.46 KG/M2

## 2020-09-01 DIAGNOSIS — I10 ESSENTIAL HYPERTENSION: ICD-10-CM

## 2020-09-01 DIAGNOSIS — R73.09 ELEVATED GLUCOSE: ICD-10-CM

## 2020-09-01 LAB
ALBUMIN SERPL-MCNC: 3.7 G/DL (ref 3.5–5.1)
ALP BLD-CCNC: 213 U/L (ref 38–126)
ALT SERPL-CCNC: 17 U/L (ref 11–66)
ANION GAP SERPL CALCULATED.3IONS-SCNC: 10 MEQ/L (ref 8–16)
AST SERPL-CCNC: 24 U/L (ref 5–40)
AVERAGE GLUCOSE: 108 MG/DL (ref 70–126)
BILIRUB SERPL-MCNC: 0.4 MG/DL (ref 0.3–1.2)
BUN BLDV-MCNC: 27 MG/DL (ref 7–22)
CALCIUM SERPL-MCNC: 9.4 MG/DL (ref 8.5–10.5)
CHLORIDE BLD-SCNC: 103 MEQ/L (ref 98–111)
CO2: 25 MEQ/L (ref 23–33)
CREAT SERPL-MCNC: 1.6 MG/DL (ref 0.4–1.2)
ERYTHROCYTE [DISTWIDTH] IN BLOOD BY AUTOMATED COUNT: 13.2 % (ref 11.5–14.5)
ERYTHROCYTE [DISTWIDTH] IN BLOOD BY AUTOMATED COUNT: 48.5 FL (ref 35–45)
GFR SERPL CREATININE-BSD FRML MDRD: 41 ML/MIN/1.73M2
GLUCOSE BLD-MCNC: 88 MG/DL (ref 70–108)
HBA1C MFR BLD: 5.6 % (ref 4.4–6.4)
HCT VFR BLD CALC: 46.2 % (ref 42–52)
HEMOGLOBIN: 14.2 GM/DL (ref 14–18)
MCH RBC QN AUTO: 30.7 PG (ref 26–33)
MCHC RBC AUTO-ENTMCNC: 30.7 GM/DL (ref 32.2–35.5)
MCV RBC AUTO: 100 FL (ref 80–94)
PLATELET # BLD: 157 THOU/MM3 (ref 130–400)
PMV BLD AUTO: 10.7 FL (ref 9.4–12.4)
POTASSIUM SERPL-SCNC: 4.6 MEQ/L (ref 3.5–5.2)
RBC # BLD: 4.62 MILL/MM3 (ref 4.7–6.1)
SODIUM BLD-SCNC: 138 MEQ/L (ref 135–145)
TOTAL PROTEIN: 7.4 G/DL (ref 6.1–8)
WBC # BLD: 5.3 THOU/MM3 (ref 4.8–10.8)

## 2020-09-01 PROCEDURE — 1123F ACP DISCUSS/DSCN MKR DOCD: CPT | Performed by: FAMILY MEDICINE

## 2020-09-01 PROCEDURE — 83036 HEMOGLOBIN GLYCOSYLATED A1C: CPT

## 2020-09-01 PROCEDURE — 99213 OFFICE O/P EST LOW 20 MIN: CPT | Performed by: FAMILY MEDICINE

## 2020-09-01 PROCEDURE — 36415 COLL VENOUS BLD VENIPUNCTURE: CPT

## 2020-09-01 PROCEDURE — 85027 COMPLETE CBC AUTOMATED: CPT

## 2020-09-01 PROCEDURE — G8427 DOCREV CUR MEDS BY ELIG CLIN: HCPCS | Performed by: FAMILY MEDICINE

## 2020-09-01 PROCEDURE — 80053 COMPREHEN METABOLIC PANEL: CPT

## 2020-09-01 PROCEDURE — 1036F TOBACCO NON-USER: CPT | Performed by: FAMILY MEDICINE

## 2020-09-01 PROCEDURE — 4040F PNEUMOC VAC/ADMIN/RCVD: CPT | Performed by: FAMILY MEDICINE

## 2020-09-01 PROCEDURE — G8420 CALC BMI NORM PARAMETERS: HCPCS | Performed by: FAMILY MEDICINE

## 2020-09-01 ASSESSMENT — ENCOUNTER SYMPTOMS
WHEEZING: 0
SHORTNESS OF BREATH: 0

## 2020-09-01 NOTE — PROGRESS NOTES
[facility]  [facility]  [address] [place] [address]  Dept: [phone]  Dept Fax: [phone]  Loc: [phone]  PROGRESS NOTE      Visit Date: 9/1/2020    Everardo Gore is a 80 y.o. male who presents today for:  Chief Complaint   Patient presents with    6 Month Follow-Up    Hypertension       Subjective:  HPI    6-month follow-up    Hypertension: On Norvasc and lisinopril. Has CKD stage III. Home  this morning. Does not do much physical activity. Hypercholesterolemia: On Zocor. LDL of 49 in March 2020. Watches TV a lot. Lives with wife and son. Son does the cooking. Review of Systems   Respiratory: Negative for shortness of breath and wheezing. Cardiovascular: Negative for chest pain and leg swelling. Neurological: Negative for syncope and light-headedness.      Patient Active Problem List   Diagnosis    Hyperlipidemia    Osteoarthritis    CRF (chronic renal failure)    Primary osteoarthritis of left hip    HTN (hypertension)    Cholecystitis    Stage 3 chronic kidney disease (HCC)    Hyponatremia    Lactic acidosis    Elevated alkaline phosphatase level    Hyperbilirubinemia    Normocytic anemia    Hypocalcemia    Hypophosphatemia    Unsteady gait    Constipation    Enlarged prostate    Splenomegaly    Urinary retention    Gross hematuria    Mild aortic regurgitation    Mild mitral regurgitation    Mild tricuspid regurgitation    S/P laparoscopic cholecystectomy    S/P right inguinal hernia repair     Past Medical History:   Diagnosis Date    Hyperlipidemia     Hypertension     Osteoarthritis     PAC (premature atrial contraction)     Paget disease of bone     S/P laparoscopic cholecystectomy 7/18/2018    S/P right inguinal hernia repair 7/18/2018      Past Surgical History:   Procedure Laterality Date    JOINT REPLACEMENT Bilateral 2013    HIP    GA LAP,CHOLECYSTECTOMY N/A 7/16/2018    CHOLECYSTECTOMY LAPAROSCOPIC performed by Shara Portillo MD at [address] Right 7/16/2018    Right HERNIA INGUINAL REPAIR performed by Shara Portillo MD at [facility] History   Problem Relation Age of Onset    Diabetes Mother     Stroke Father     Stroke Sister     Cancer Brother     Heart Disease Brother     Stroke Brother      Social History     Tobacco Use    Smoking status: Never Smoker    Smokeless tobacco: Never Used   Substance Use Topics    Alcohol use: No      Current Outpatient Medications   Medication Sig Dispense Refill    simvastatin (ZOCOR) 5 MG tablet TAKE 1 TABLET  NIGHTLY 90 tablet 3    amLODIPine (NORVASC) 10 MG tablet TAKE 1 TABLET EVERY DAY 90 tablet 3    lisinopril (PRINIVIL;ZESTRIL) 10 MG tablet TAKE 1 TABLET EVERY DAY 90 tablet 3     No current facility-administered medications for this visit. Allergies   Allergen Reactions    Asa [Aspirin]      bleeding     Health Maintenance   Topic Date Due    DTaP/Tdap/Td vaccine (1 - Tdap) 09[birth year]19/1952    Shingles Vaccine (1 of 2) 09/19/1983    Pneumococcal 65+ years Vaccine (1 of 1 - PPSV23) 09/19/1998    Annual Wellness Visit (AWV)  05/29/2019    PSA counseling  11/01/2019    Flu vaccine (1) 09/01/2020    Lipid screen  03/03/2021    Potassium monitoring  03/03/2021    Creatinine monitoring  03/03/2021    Hepatitis A vaccine  Aged Out    Hepatitis B vaccine  Aged Out    Hib vaccine  Aged Out    Meningococcal (ACWY) vaccine  Aged Out       Objective:  /72 (Site: Left Upper Arm, Position: Sitting)   Pulse 62   Temp 97.1 °F (36.2 °C)   Ht 6' (1.829 m)   Wt 180 lb 9.6 oz (81.9 kg)   SpO2 99%   BMI 24.49 kg/m²   Physical Exam  Vitals signs reviewed. Constitutional:       General: He is not in acute distress. Appearance: He is well-developed. He is not ill-appearing. Cardiovascular:      Rate and Rhythm: Normal rate and regular rhythm.       Heart sounds: No murmur. Pulmonary:      Effort: Pulmonary effort is normal. No respiratory distress. Breath sounds: Normal breath sounds. No wheezing. Musculoskeletal:      Right lower leg: No edema. Left lower leg: No edema. Neurological:      Mental Status: He is alert. Mental status is at baseline. Psychiatric:         Mood and Affect: Mood normal.         Behavior: Behavior normal.         Impression/Plan:  1. Essential hypertension  Chronic. Well-controlled. Continue Norvasc and lisinopril. - Comprehensive Metabolic Panel; Future  - CBC; Future    2. Hyperlipidemia, unspecified hyperlipidemia type  Chronic. Controlled. Continue Zocor    3. Stage 3 chronic kidney disease (HCC)  Chronic. Continue blood pressure control    4. Elevated glucose  History of mildly elevated glucose. Check A1c to rule out diabetes  - Hemoglobin A1C; Future    5. At high risk for falls      They voiced understanding. All questions answered. They agreed with treatment plan. See patient instructions for any educational materials that may have been given. Discussed use, benefit, and side effects of prescribed medications. Reviewed health maintenance. (Please note that portions of this note may have been completed with a voice recognition program.  Efforts were made to edit the dictation but occasionally words are mis-transcribed.)    Return in about 6 months (around 3/1/2021) for medicare wellness + HTN. Electronically signed by Emily Galvin MD on 9/1/2020 at 8:27 AM  On the basis of positive falls risk screening, assessment and plan is as follows: patient declines any further evaluation/treatment for increased falls risk.

## 2021-02-26 ENCOUNTER — OFFICE VISIT (OUTPATIENT)
Dept: FAMILY MEDICINE CLINIC | Age: 86
End: 2021-02-26
Payer: MEDICARE

## 2021-02-26 VITALS
TEMPERATURE: 97.2 F | HEART RATE: 92 BPM | WEIGHT: 188.4 LBS | OXYGEN SATURATION: 98 % | BODY MASS INDEX: 25.52 KG/M2 | DIASTOLIC BLOOD PRESSURE: 72 MMHG | HEIGHT: 72 IN | SYSTOLIC BLOOD PRESSURE: 110 MMHG

## 2021-02-26 DIAGNOSIS — N18.32 STAGE 3B CHRONIC KIDNEY DISEASE (HCC): ICD-10-CM

## 2021-02-26 DIAGNOSIS — I10 ESSENTIAL HYPERTENSION: ICD-10-CM

## 2021-02-26 DIAGNOSIS — E78.5 HYPERLIPIDEMIA, UNSPECIFIED HYPERLIPIDEMIA TYPE: ICD-10-CM

## 2021-02-26 DIAGNOSIS — Z00.00 ROUTINE GENERAL MEDICAL EXAMINATION AT A HEALTH CARE FACILITY: Primary | ICD-10-CM

## 2021-02-26 DIAGNOSIS — R74.8 ELEVATED ALKALINE PHOSPHATASE LEVEL: ICD-10-CM

## 2021-02-26 LAB
ALBUMIN SERPL-MCNC: 3.6 G/DL (ref 3.5–5.1)
ALP BLD-CCNC: 198 U/L (ref 38–126)
ALT SERPL-CCNC: 15 U/L (ref 11–66)
ANION GAP SERPL CALCULATED.3IONS-SCNC: 7 MEQ/L (ref 8–16)
AST SERPL-CCNC: 23 U/L (ref 5–40)
BILIRUB SERPL-MCNC: 0.5 MG/DL (ref 0.3–1.2)
BUN BLDV-MCNC: 29 MG/DL (ref 7–22)
CALCIUM SERPL-MCNC: 8.9 MG/DL (ref 8.5–10.5)
CHLORIDE BLD-SCNC: 104 MEQ/L (ref 98–111)
CO2: 24 MEQ/L (ref 23–33)
CREAT SERPL-MCNC: 1.8 MG/DL (ref 0.4–1.2)
ERYTHROCYTE [DISTWIDTH] IN BLOOD BY AUTOMATED COUNT: 13.2 % (ref 11.5–14.5)
ERYTHROCYTE [DISTWIDTH] IN BLOOD BY AUTOMATED COUNT: 45.4 FL (ref 35–45)
GFR SERPL CREATININE-BSD FRML MDRD: 36 ML/MIN/1.73M2
GLUCOSE BLD-MCNC: 117 MG/DL (ref 70–108)
HCT VFR BLD CALC: 40.4 % (ref 42–52)
HEMOGLOBIN: 12.9 GM/DL (ref 14–18)
MCH RBC QN AUTO: 30.6 PG (ref 26–33)
MCHC RBC AUTO-ENTMCNC: 31.9 GM/DL (ref 32.2–35.5)
MCV RBC AUTO: 95.7 FL (ref 80–94)
PLATELET # BLD: 147 THOU/MM3 (ref 130–400)
PMV BLD AUTO: 10.9 FL (ref 9.4–12.4)
POTASSIUM SERPL-SCNC: 4.2 MEQ/L (ref 3.5–5.2)
RBC # BLD: 4.22 MILL/MM3 (ref 4.7–6.1)
SODIUM BLD-SCNC: 135 MEQ/L (ref 135–145)
TOTAL PROTEIN: 7.4 G/DL (ref 6.1–8)
WBC # BLD: 3.9 THOU/MM3 (ref 4.8–10.8)

## 2021-02-26 PROCEDURE — G8484 FLU IMMUNIZE NO ADMIN: HCPCS | Performed by: FAMILY MEDICINE

## 2021-02-26 PROCEDURE — 1123F ACP DISCUSS/DSCN MKR DOCD: CPT | Performed by: FAMILY MEDICINE

## 2021-02-26 PROCEDURE — G0438 PPPS, INITIAL VISIT: HCPCS | Performed by: FAMILY MEDICINE

## 2021-02-26 PROCEDURE — 4040F PNEUMOC VAC/ADMIN/RCVD: CPT | Performed by: FAMILY MEDICINE

## 2021-02-26 RX ORDER — SIMVASTATIN 5 MG
TABLET ORAL
Qty: 90 TABLET | Refills: 1 | Status: SHIPPED | OUTPATIENT
Start: 2021-02-26 | End: 2021-08-27 | Stop reason: SDUPTHER

## 2021-02-26 RX ORDER — LISINOPRIL 10 MG/1
TABLET ORAL
Qty: 90 TABLET | Refills: 1 | Status: SHIPPED | OUTPATIENT
Start: 2021-02-26 | End: 2021-08-27 | Stop reason: SDUPTHER

## 2021-02-26 RX ORDER — AMLODIPINE BESYLATE 10 MG/1
TABLET ORAL
Qty: 90 TABLET | Refills: 1 | Status: SHIPPED | OUTPATIENT
Start: 2021-02-26 | End: 2021-08-27 | Stop reason: SDUPTHER

## 2021-02-26 NOTE — PROGRESS NOTES
Medicare Annual Wellness Visit  Name: Aaron Combs Date: 2021   MRN: [id number] Sex: Male   Age: 80 y.o. Ethnicity: Non-/Non    : 1933 Race: Oscar Hough is here for Medicare AWV and 6 Month Follow-Up    Screenings for behavioral, psychosocial and functional/safety risks, and cognitive dysfunction are all negative except as indicated below. These results, as well as other patient data from the [address] [facility], are documented in Flowsheets linked to this Encounter. Allergies   Allergen Reactions    Asa [Aspirin]      bleeding       Prior to Visit Medications    Medication Sig Taking?  Authorizing Provider   simvastatin (ZOCOR) 5 MG tablet TAKE 1 TABLET  NIGHTLY Yes Matthew Recinos MD   amLODIPine (NORVASC) 10 MG tablet TAKE 1 TABLET EVERY DAY Yes Matthew Recinos MD   lisinopril (PRINIVIL;ZESTRIL) 10 MG tablet TAKE 1 TABLET EVERY DAY Yes Gabriela Grimm MD       Past Medical History:   Diagnosis Date    Hyperlipidemia     Hypertension     Osteoarthritis     PAC (premature atrial contraction)     Paget disease of bone     S/P laparoscopic cholecystectomy 2018    S/P right inguinal hernia repair 2018       Past Surgical History:   Procedure Laterality Date    JOINT REPLACEMENT Bilateral     HIP    NE LAP,CHOLECYSTECTOMY N/A 2018    CHOLECYSTECTOMY LAPAROSCOPIC performed by Nga Fish MD at [address] [place] [address] Right 2018    Right HERNIA INGUINAL REPAIR performed by Nga Fish MD at [place] [address] History   Problem Relation Age of Onset    Diabetes Mother     Stroke Father     Stroke Sister     Cancer Brother     Heart Disease Brother     Stroke Brother        [facility]cluding outside providers/suppliers regularly involved in providing care):   Patient Care Team:  Royal Quinonez MD as PCP - General (Family Medicine)  Royal Quinonez MD as PCP - [facility] Provider    Wt Readings from Last 3 Encounters:   02/26/21 188 lb 6.4 oz (85.5 kg)   09/01/20 180 lb 9.6 oz (81.9 kg)   03/03/20 192 lb 3.2 oz (87.2 kg)     Vitals:    02/26/21 0753   BP: 110/72   Site: Left Upper Arm   Position: Sitting   Pulse: 92   Temp: 97.2 °F (36.2 °C)   SpO2: 98%   Weight: 188 lb 6.4 oz (85.5 kg)   Height: 6' (1.829 m)     Body mass index is 25.55 kg/m². Based upon direct observation of the patient, evaluation of cognition reveals recent and remote memory intact per patient    Physical Exam  Vitals signs reviewed. Constitutional:       General: He is not in acute distress. Appearance: He is well-developed. He is not ill-appearing. Cardiovascular:      Rate and Rhythm: Normal rate and regular rhythm. Heart sounds: No murmur. Pulmonary:      Effort: Pulmonary effort is normal. No respiratory distress. Breath sounds: Normal breath sounds. No wheezing. Musculoskeletal:      Right lower leg: No edema. Left lower leg: No edema. Neurological:      Mental Status: He is alert. Mental status is at baseline. Psychiatric:         Mood and Affect: Mood normal.         Behavior: Behavior normal.           Patient's complete Health Risk Assessment and screening values have been reviewed and are found in Flowsheets. The following problems were reviewed today and where indicated follow up appointments were made and/or referrals ordered. Positive Risk Factor Screenings with Interventions:          General Health and ACP:  General  In general, how would you say your health is?: Fair  In the past 7 days, have you experienced any of the following?  New or Increased Pain, New or Increased Fatigue, Loneliness, Social Isolation, Stress or Anger?: None of These  Do you get the social and emotional support that you need?: Yes  Do you have a Living Will?: Yes  Advance Directives     Power of  Living Will ACP-Advance Directive ACP-Power of     Not on File Not on File Not on File Not on File      General Health Risk Interventions:  · Poor self-assessment of health status: patient declines any further evaluation/treatment for this issue    Health Habits/Nutrition:  Health Habits/Nutrition  Do you exercise for at least 20 minutes 2-3 times per week?: (!) No  Have you lost any weight without trying in the past 3 months?: No  Do you eat only one meal per day?: No  Have you seen the dentist within the past year?: (!) No(dentures)  Body mass index: (!) 25.55  Health Habits/Nutrition Interventions:  · Inadequate physical activity:  patient is not ready to increase his/her physical activity level at this time  · Dental exam overdue:  patient declines dental evaluation, has false teeth    Hearing/Vision:  No exam data present  Hearing/Vision  Do you or your family notice any trouble with your hearing that hasn't been managed with hearing aids?: No  Do you have difficulty driving, watching TV, or doing any of your daily activities because of your eyesight?: No  Have you had an eye exam within the past year?: (!) No  Hearing/Vision Interventions:  · Vision concerns:  patient encouraged to make appointment with his/her eye specialist      Personalized Preventive Plan   Current Health Maintenance Status    There is no immunization history on file for this patient.      Health Maintenance   Topic Date Due    COVID-19 Vaccine (1 of 2) 09/19/1949    DTaP/Tdap/Td vaccine (1 - Tdap) 09/19/1952    Shingles Vaccine (1 of 2) 09/19/1983    Pneumococcal 65+ years Vaccine (1 of 1 - PPSV23) 09/19/1998    Annual Wellness Visit (AWV)  05/29/2019    PSA counseling  11/01/2019    Lipid screen  03/03/2021    Flu vaccine (1) 06/30/2021 (Originally 9/1/2020)    Potassium monitoring  09/01/2021    Creatinine monitoring  09/01/2021    Hepatitis A vaccine  Aged Out    Hepatitis B vaccine  Aged Out    Hib vaccine  Aged Out    Meningococcal (ACWY) vaccine  Aged Out     Recommendations for Preventive Services Due: see orders and patient instructions/AVS.  . Recommended screening schedule for the next 5-10 years is provided to the patient in written form: see Patient Maday Escobedo was seen today for medicare awv and 6 month follow-up. Diagnoses and all orders for this visit:    Routine general medical examination at a health care facility    Essential hypertension  -     lisinopril (PRINIVIL;ZESTRIL) 10 MG tablet; TAKE 1 TABLET EVERY DAY  -     amLODIPine (NORVASC) 10 MG tablet; TAKE 1 TABLET EVERY DAY  -     Cancel: Basic Metabolic Panel; Future  -     CBC; Future  -     Comprehensive Metabolic Panel; Future  -     Comprehensive Metabolic Panel  -     CBC    Hyperlipidemia, unspecified hyperlipidemia type  -     simvastatin (ZOCOR) 5 MG tablet; TAKE 1 TABLET  NIGHTLY  -     Comprehensive Metabolic Panel; Future  -     Comprehensive Metabolic Panel    Stage 3b chronic kidney disease  -     Cancel: Basic Metabolic Panel; Future  -     Comprehensive Metabolic Panel; Future  -     Comprehensive Metabolic Panel    Elevated alkaline phosphatase level  -     Comprehensive Metabolic Panel; Future  -     Comprehensive Metabolic Panel             Above problems are deemed controlled and medications are refilled.   Recheck alk phos elevation from previous labs    declines pneumonia vaccine  declines lipid panel    F/u 6 months HTN      Won Ferrell MD

## 2021-02-26 NOTE — PATIENT INSTRUCTIONS
Personalized Preventive Plan for Aline Cho - 2/26/2021  Medicare offers a range of preventive health benefits. Some of the tests and screenings are paid in full while other may be subject to a deductible, co-insurance, and/or copay. Some of these benefits include a comprehensive review of your medical history including lifestyle, illnesses that may run in your family, and various assessments and screenings as appropriate. After reviewing your medical record and screening and assessments performed today your provider may have ordered immunizations, labs, imaging, and/or referrals for you. A list of these orders (if applicable) as well as your Preventive Care list are included within your After Visit Summary for your review. Other Preventive Recommendations:    · A preventive eye exam performed by an eye specialist is recommended every 1-2 years to screen for glaucoma; cataracts, macular degeneration, and other eye disorders. · A preventive dental visit is recommended every 6 months. · Try to get at least 150 minutes of exercise per week or 10,000 steps per day on a pedometer . · Order or download the FREE \"Exercise & Physical Activity: Your Everyday Guide\" from [employer]. Call 8-447.896.1952 or search [employer] online. · You need 8117-8206 mg of calcium and 1033-1813 IU of vitamin D per day. It is possible to meet your calcium requirement with diet alone, but a vitamin D supplement is usually necessary to meet this goal.  · When exposed to the sun, use a sunscreen that protects against both UVA and UVB radiation with an SPF of 30 or greater. Reapply every 2 to 3 hours or after sweating, drying off with a towel, or swimming. · Always wear a seat belt when traveling in a car. Always wear a helmet when riding a bicycle or motorcycle.

## 2021-03-01 DIAGNOSIS — D72.818 OTHER DECREASED WHITE BLOOD CELL (WBC) COUNT: Primary | ICD-10-CM

## 2021-07-02 DIAGNOSIS — I10 ESSENTIAL HYPERTENSION: ICD-10-CM

## 2021-07-02 DIAGNOSIS — E78.5 HYPERLIPIDEMIA, UNSPECIFIED HYPERLIPIDEMIA TYPE: ICD-10-CM

## 2021-07-02 RX ORDER — AMLODIPINE BESYLATE 10 MG/1
TABLET ORAL
Qty: 90 TABLET | Refills: 1 | OUTPATIENT
Start: 2021-07-02

## 2021-07-02 RX ORDER — LISINOPRIL 10 MG/1
TABLET ORAL
Qty: 90 TABLET | Refills: 1 | OUTPATIENT
Start: 2021-07-02

## 2021-07-02 RX ORDER — SIMVASTATIN 5 MG
TABLET ORAL
Qty: 90 TABLET | Refills: 1 | OUTPATIENT
Start: 2021-07-02

## 2021-08-27 ENCOUNTER — OFFICE VISIT (OUTPATIENT)
Dept: FAMILY MEDICINE CLINIC | Age: 86
End: 2021-08-27
Payer: MEDICARE

## 2021-08-27 VITALS
TEMPERATURE: 97 F | BODY MASS INDEX: 25.68 KG/M2 | DIASTOLIC BLOOD PRESSURE: 76 MMHG | RESPIRATION RATE: 16 BRPM | WEIGHT: 189.6 LBS | SYSTOLIC BLOOD PRESSURE: 120 MMHG | OXYGEN SATURATION: 98 % | HEART RATE: 82 BPM | HEIGHT: 72 IN

## 2021-08-27 DIAGNOSIS — R73.09 ELEVATED GLUCOSE: ICD-10-CM

## 2021-08-27 DIAGNOSIS — E78.5 HYPERLIPIDEMIA, UNSPECIFIED HYPERLIPIDEMIA TYPE: ICD-10-CM

## 2021-08-27 DIAGNOSIS — N18.32 STAGE 3B CHRONIC KIDNEY DISEASE (HCC): ICD-10-CM

## 2021-08-27 DIAGNOSIS — I10 ESSENTIAL HYPERTENSION: Primary | ICD-10-CM

## 2021-08-27 PROCEDURE — G8427 DOCREV CUR MEDS BY ELIG CLIN: HCPCS | Performed by: FAMILY MEDICINE

## 2021-08-27 PROCEDURE — G8417 CALC BMI ABV UP PARAM F/U: HCPCS | Performed by: FAMILY MEDICINE

## 2021-08-27 PROCEDURE — 1036F TOBACCO NON-USER: CPT | Performed by: FAMILY MEDICINE

## 2021-08-27 PROCEDURE — 99214 OFFICE O/P EST MOD 30 MIN: CPT | Performed by: FAMILY MEDICINE

## 2021-08-27 PROCEDURE — 4040F PNEUMOC VAC/ADMIN/RCVD: CPT | Performed by: FAMILY MEDICINE

## 2021-08-27 PROCEDURE — 1123F ACP DISCUSS/DSCN MKR DOCD: CPT | Performed by: FAMILY MEDICINE

## 2021-08-27 RX ORDER — LISINOPRIL 10 MG/1
TABLET ORAL
Qty: 90 TABLET | Refills: 1 | Status: SHIPPED | OUTPATIENT
Start: 2021-08-27 | End: 2022-02-07

## 2021-08-27 RX ORDER — SIMVASTATIN 5 MG
TABLET ORAL
Qty: 90 TABLET | Refills: 1 | Status: SHIPPED | OUTPATIENT
Start: 2021-08-27 | End: 2022-02-07

## 2021-08-27 RX ORDER — AMLODIPINE BESYLATE 10 MG/1
TABLET ORAL
Qty: 90 TABLET | Refills: 1 | Status: SHIPPED | OUTPATIENT
Start: 2021-08-27 | End: 2022-01-24

## 2021-08-27 SDOH — ECONOMIC STABILITY: FOOD INSECURITY: WITHIN THE PAST 12 MONTHS, THE FOOD YOU BOUGHT JUST DIDN'T LAST AND YOU DIDN'T HAVE MONEY TO GET MORE.: NEVER TRUE

## 2021-08-27 SDOH — ECONOMIC STABILITY: FOOD INSECURITY: WITHIN THE PAST 12 MONTHS, YOU WORRIED THAT YOUR FOOD WOULD RUN OUT BEFORE YOU GOT MONEY TO BUY MORE.: NEVER TRUE

## 2021-08-27 ASSESSMENT — PATIENT HEALTH QUESTIONNAIRE - PHQ9
1. LITTLE INTEREST OR PLEASURE IN DOING THINGS: 0
SUM OF ALL RESPONSES TO PHQ QUESTIONS 1-9: 0
SUM OF ALL RESPONSES TO PHQ QUESTIONS 1-9: 0
SUM OF ALL RESPONSES TO PHQ9 QUESTIONS 1 & 2: 0
SUM OF ALL RESPONSES TO PHQ QUESTIONS 1-9: 0
2. FEELING DOWN, DEPRESSED OR HOPELESS: 0

## 2021-08-27 ASSESSMENT — ENCOUNTER SYMPTOMS
SHORTNESS OF BREATH: 0
WHEEZING: 0

## 2021-08-27 ASSESSMENT — SOCIAL DETERMINANTS OF HEALTH (SDOH): HOW HARD IS IT FOR YOU TO PAY FOR THE VERY BASICS LIKE FOOD, HOUSING, MEDICAL CARE, AND HEATING?: NOT HARD AT ALL

## 2021-08-27 NOTE — PROGRESS NOTES
[facility]ESSIONAL [facility]  [facility]  [address] [place] [address]  Dept: [phone]  Dept Fax: [phone]  Loc: [phone]  PROGRESS NOTE      Visit Date: 8/27/2021    Bernice Jenkins is a 80 y.o. male who presents today for:  Chief Complaint   Patient presents with    6 Month Follow-Up    Hypertension       Subjective:  HPI     6-month follow-up     hypertension: On Norvasc and lisinopril. Has CKD stage III. Home SBP in 130-140s. Hyperlipidemia: On Zocor. No myalgias. No concerns    Review of Systems   Respiratory: Negative for shortness of breath and wheezing. Cardiovascular: Negative for chest pain. Neurological: Negative for dizziness and syncope.      Patient Active Problem List   Diagnosis    Hyperlipidemia    Osteoarthritis    CRF (chronic renal failure)    Primary osteoarthritis of left hip    HTN (hypertension)    Cholecystitis    Stage 3 chronic kidney disease (HCC)    Elevated alkaline phosphatase level    Constipation    Enlarged prostate    Splenomegaly    Urinary retention    Mild aortic regurgitation    Mild mitral regurgitation    Mild tricuspid regurgitation    S/P laparoscopic cholecystectomy    S/P right inguinal hernia repair     Past Medical History:   Diagnosis Date    Hyperlipidemia     Hypertension     Osteoarthritis     PAC (premature atrial contraction)     Paget disease of bone     S/P laparoscopic cholecystectomy 7/18/2018    S/P right inguinal hernia repair 7/18/2018      Past Surgical History:   Procedure Laterality Date    JOINT REPLACEMENT Bilateral 2013    HIP    MD LAP,CHOLECYSTECTOMY N/A 7/16/2018    CHOLECYSTECTOMY LAPAROSCOPIC performed by Indra Klein MD at [address] [place] [address] Right 7/16/2018    Right HERNIA INGUINAL REPAIR performed by Indra Klein MD at [facility]     Family History   Problem Relation Age of Onset    Diabetes Mother    Nando Bain Stroke Father     Stroke Sister     Cancer Brother     Heart Disease Brother     Stroke Brother      Social History     Tobacco Use    Smoking status: Never Smoker    Smokeless tobacco: Never Used   Substance Use Topics    Alcohol use: No      Current Outpatient Medications   Medication Sig Dispense Refill    lisinopril (PRINIVIL;ZESTRIL) 10 MG tablet TAKE 1 TABLET EVERY DAY 90 tablet 1    amLODIPine (NORVASC) 10 MG tablet TAKE 1 TABLET EVERY DAY 90 tablet 1    simvastatin (ZOCOR) 5 MG tablet TAKE 1 TABLET  NIGHTLY 90 tablet 1     No current facility-administered medications for this visit. Allergies   Allergen Reactions    Asa [Aspirin]      bleeding     Health Maintenance   Topic Date Due    DTaP/Tdap/Td vaccine (1 - Tdap) Never done    Shingles Vaccine (1 of 2) Never done    Pneumococcal 65+ years Vaccine (1 of 1 - PPSV23) Never done    PSA counseling  11/01/2019    Lipid screen  03/03/2021    Flu vaccine (1) 09/01/2021    Potassium monitoring  02/26/2022    Creatinine monitoring  02/26/2022    Annual Wellness Visit (AWV)  02/27/2022    COVID-19 Vaccine  Completed    Hepatitis A vaccine  Aged Out    Hepatitis B vaccine  Aged Out    Hib vaccine  Aged Out    Meningococcal (ACWY) vaccine  Aged Out       Objective:  /76 (Site: Left Upper Arm, Position: Sitting)   Pulse 82   Temp 97 °F (36.1 °C)   Resp 16   Ht 6' (1.829 m)   Wt 189 lb 9.6 oz (86 kg)   SpO2 98%   BMI 25.71 kg/m²   Physical Exam  Vitals reviewed. Constitutional:       General: He is not in acute distress. Appearance: He is well-developed. He is not ill-appearing or toxic-appearing. Cardiovascular:      Rate and Rhythm: Normal rate and regular rhythm. Heart sounds: No murmur heard. Pulmonary:      Effort: Pulmonary effort is normal. No respiratory distress. Breath sounds: Normal breath sounds. No wheezing. Musculoskeletal:      Right lower leg: No edema. Left lower leg: No edema.

## 2021-08-28 ENCOUNTER — HOSPITAL ENCOUNTER (OUTPATIENT)
Age: 86
Discharge: HOME | End: 2021-08-28
Payer: MEDICARE

## 2021-08-28 DIAGNOSIS — R73.09 ELEVATED GLUCOSE: ICD-10-CM

## 2021-08-28 LAB
AVERAGE GLUCOSE: 105 MG/DL (ref 70–126)
HBA1C MFR BLD: 5.5 % (ref 4.4–6.4)

## 2021-08-28 PROCEDURE — 83036 HEMOGLOBIN GLYCOSYLATED A1C: CPT

## 2021-08-28 PROCEDURE — 36415 COLL VENOUS BLD VENIPUNCTURE: CPT

## 2021-08-30 ENCOUNTER — TELEPHONE (OUTPATIENT)
Dept: FAMILY MEDICINE CLINIC | Age: 86
End: 2021-08-30

## 2021-08-30 NOTE — TELEPHONE ENCOUNTER
----- Message from Romie Conroy MD sent at 8/29/2021  8:28 PM EDT -----  Normal a1c. No diabetes. Please advise patient.   Romie Conroy MD

## 2022-01-24 DIAGNOSIS — I10 ESSENTIAL HYPERTENSION: ICD-10-CM

## 2022-01-24 RX ORDER — AMLODIPINE BESYLATE 10 MG/1
TABLET ORAL
Qty: 90 TABLET | Refills: 1 | Status: SHIPPED | OUTPATIENT
Start: 2022-01-24 | End: 2022-08-22

## 2022-02-02 DIAGNOSIS — I10 ESSENTIAL HYPERTENSION: ICD-10-CM

## 2022-02-02 DIAGNOSIS — E78.5 HYPERLIPIDEMIA, UNSPECIFIED HYPERLIPIDEMIA TYPE: ICD-10-CM

## 2022-02-07 RX ORDER — SIMVASTATIN 5 MG
TABLET ORAL
Qty: 90 TABLET | Refills: 1 | Status: SHIPPED | OUTPATIENT
Start: 2022-02-07 | End: 2022-08-08

## 2022-02-07 RX ORDER — LISINOPRIL 10 MG/1
TABLET ORAL
Qty: 90 TABLET | Refills: 1 | Status: SHIPPED | OUTPATIENT
Start: 2022-02-07 | End: 2022-08-08

## 2022-02-07 NOTE — TELEPHONE ENCOUNTER
[facility] called requesting a refill on the following medications:  Requested Prescriptions     Pending Prescriptions Disp Refills    simvastatin (ZOCOR) 5 MG tablet [Pharmacy Med Name: SIMVASTATIN 5 MG Tablet] 90 tablet 1     Sig: TAKE 1 TABLET EVERY NIGHT    lisinopril (PRINIVIL;ZESTRIL) 10 MG tablet [Pharmacy Med Name: LISINOPRIL 10 MG Tablet] 90 tablet 1     Sig: TAKE 1 TABLET EVERY DAY       Date of last visit: 8/27/2021  Date of next visit (if applicable):2/28/2022  Date of last refill: 8/27/21  Pharmacy Name: [facility] LPN

## 2022-02-28 ENCOUNTER — OFFICE VISIT (OUTPATIENT)
Dept: FAMILY MEDICINE CLINIC | Age: 87
End: 2022-02-28
Payer: MEDICARE

## 2022-02-28 VITALS
DIASTOLIC BLOOD PRESSURE: 72 MMHG | HEART RATE: 99 BPM | OXYGEN SATURATION: 72 % | WEIGHT: 188.6 LBS | TEMPERATURE: 97.6 F | HEIGHT: 72 IN | SYSTOLIC BLOOD PRESSURE: 132 MMHG | BODY MASS INDEX: 25.55 KG/M2

## 2022-02-28 DIAGNOSIS — Z00.00 MEDICARE ANNUAL WELLNESS VISIT, SUBSEQUENT: Primary | ICD-10-CM

## 2022-02-28 DIAGNOSIS — E78.49 OTHER HYPERLIPIDEMIA: ICD-10-CM

## 2022-02-28 DIAGNOSIS — I10 PRIMARY HYPERTENSION: ICD-10-CM

## 2022-02-28 DIAGNOSIS — N18.32 STAGE 3B CHRONIC KIDNEY DISEASE (HCC): ICD-10-CM

## 2022-02-28 PROCEDURE — G0439 PPPS, SUBSEQ VISIT: HCPCS | Performed by: FAMILY MEDICINE

## 2022-02-28 PROCEDURE — 1123F ACP DISCUSS/DSCN MKR DOCD: CPT | Performed by: FAMILY MEDICINE

## 2022-02-28 PROCEDURE — 4040F PNEUMOC VAC/ADMIN/RCVD: CPT | Performed by: FAMILY MEDICINE

## 2022-02-28 PROCEDURE — G8484 FLU IMMUNIZE NO ADMIN: HCPCS | Performed by: FAMILY MEDICINE

## 2022-02-28 ASSESSMENT — PATIENT HEALTH QUESTIONNAIRE - PHQ9
SUM OF ALL RESPONSES TO PHQ9 QUESTIONS 1 & 2: 0
1. LITTLE INTEREST OR PLEASURE IN DOING THINGS: 0
SUM OF ALL RESPONSES TO PHQ QUESTIONS 1-9: 0
2. FEELING DOWN, DEPRESSED OR HOPELESS: 0
SUM OF ALL RESPONSES TO PHQ QUESTIONS 1-9: 0

## 2022-02-28 ASSESSMENT — LIFESTYLE VARIABLES: HOW OFTEN DO YOU HAVE A DRINK CONTAINING ALCOHOL: NEVER

## 2022-02-28 NOTE — PATIENT INSTRUCTIONS
Personalized Preventive Plan for Leland Sos - 2/28/2022  Medicare offers a range of preventive health benefits. Some of the tests and screenings are paid in full while other may be subject to a deductible, co-insurance, and/or copay. Some of these benefits include a comprehensive review of your medical history including lifestyle, illnesses that may run in your family, and various assessments and screenings as appropriate. After reviewing your medical record and screening and assessments performed today your provider may have ordered immunizations, labs, imaging, and/or referrals for you. A list of these orders (if applicable) as well as your Preventive Care list are included within your After Visit Summary for your review. Other Preventive Recommendations:    · A preventive eye exam performed by an eye specialist is recommended every 1-2 years to screen for glaucoma; cataracts, macular degeneration, and other eye disorders. · A preventive dental visit is recommended every 6 months. · Try to get at least 150 minutes of exercise per week or 10,000 steps per day on a pedometer . · Order or download the FREE \"Exercise & Physical Activity: Your Everyday Guide\" from [employer]. Call 9-385.966.1692 or search [employer] online. · You need 8343-8193 mg of calcium and 5469-4355 IU of vitamin D per day. It is possible to meet your calcium requirement with diet alone, but a vitamin D supplement is usually necessary to meet this goal.  · When exposed to the sun, use a sunscreen that protects against both UVA and UVB radiation with an SPF of 30 or greater. Reapply every 2 to 3 hours or after sweating, drying off with a towel, or swimming. · Always wear a seat belt when traveling in a car. Always wear a helmet when riding a bicycle or motorcycle.     Keeping Home a Merged with [facility]       As we get older, changes in balance, gait, strength, vision, hearing, and cognition make even the most youthful senior more prone to accidents. Falls are one of the leading health risks for older people. This increased risk of falling is related to:   Aging process (eg, decreased muscle strength, slowed reflexes)   Higher incidence of chronic health problems (eg, arthritis, diabetes) that may limit mobility, agility or sensory awareness   Side effects of medicine (eg, dizziness, blurred vision)especially medicines like prescription pain medicines and drugs used to treat mental health conditions   Depending on the brittleness of your bones, the consequences of a fall can be serious and long lasting. Home Life   Research by the Association of Aging [facility]) shows that some home accidents among older adults can be prevented by making simple lifestyle changes and basic modifications and repairs to the home environment. Here are some lifestyle changes that experts recommend:   Have your hearing and vision checked regularly. Be sure to wear prescription glasses that are right for you. Speak to your doctor or pharmacist about the possible side effects of your medicines. A number of medicines can cause dizziness. If you have problems with sleep, talk to your doctor. Limit your intake of alcohol. If necessary, use a cane or walker to help maintain your balance. Wear supportive, rubber-soled shoes, even at home. If you live in a region that gets wintry weather, you may want to put special cleats on your shoes to prevent you from slipping on the snow and ice. Exercise regularly to help maintain muscle tone, agility, and balance. Always hold the banister when going up or down stairs. Also, use  bars when getting in or out of the bath or shower, or using the toilet. To avoid dizziness, get up slowly from a lying down position. Sit up first, dangling your legs for a minute or two before rising to a standing position.    Overall Home Safety Check   According to the Consumer Product Safety Commision's \"Older Consumer Home Safety Checklist,\" it is important to check for potential hazards in each room. And remember, proper lighting is an essential factor in home safety. If you cannot see clearly, you are more likely to fall. Important questions to ask yourself include:   Are lamp, electric, extension, and telephone cords placed out of the flow of traffic and maintained in good condition? Have frayed cords been replaced? Are all small rugs and runners slip resistant? If not, you can secure them to the floor with a special double-sided carpet tape. Are smoke detectors properly locatedone on every floor of your home and one outside of every sleeping area? Are they in good working order? Are batteries replaced at least once a year? Do you have a well-maintained carbon monoxide detector outside every sleeping are in your home? Does your furniture layout leave plenty of space to maneuver between and around chairs, tables, beds, and sofas? Are hallways, stairs and passages between rooms well lit? Can you reach a lamp without getting out of bed? Are floor surfaces well maintained? Shag rugs, high-pile carpeting, tile floors, and polished wood floors can be particularly slippery. Stairs should always have handrails and be carpeted or fitted with a non-skid tread. Is your telephone easily reachable. Is the cord safely tucked away? Room by Room   According to the Association of Aging, bathrooms and susan are the two most potentially hazardous rooms in your home. In the Kitchen    Be sure your stove is in proper working order and always make sure burners and the oven are off before you go out or go to sleep. Keep pots on the back burners, turn handles away from the front of the stove, and keep stove clean and free of grease build-up. Kitchen ventilation systems and range exhausts should be working properly.     Keep flammable objects such as towels and pot holders away from the cooking area except when in use. Make sure kitchen curtains are tied back. Move cords and appliances away from the sink and hot surfaces. If extension cords are needed, install wiring guides so they do not hang over the sink, range, or working areas. Look for coffee pots, kettles and toaster ovens with automatic shut-offs. Keep a mop handy in the kitchen so you can wipe up spills instantly. You should also have a small fire extinguisher. Arrange your kitchen with frequently used items on lower shelves to avoid the need to stand on a stepstool to reach them. Make sure countertops are well-lit to avoid injuries while cutting and preparing food. In the Bathroom    Use a non-slip mat or decals in the tub and shower, since wet, soapy tile or porcelain surfaces are extremely slippery. Make sure bathroom rugs are non-skid or tape them firmly to the floor. Bathtubs should have at least one, preferably two, grab bars, firmly attached to structural supports in the wall. (Do not use built-in soap holders or glass shower doors as grab bars.)    Tub seats fitted with non-slip material on the legs allow you to wash sitting down. For people with limited mobility, bathtub transfer benches allow you to slide safely into the tub. Raised toilet seats and toilet safety rails are helpful for those with knee or hip problems. In the [facility]    Make sure you use a nightlight and that the area around your bed is clear of potential obstacles. Be careful with electric blankets and never go to sleep with a heating pad, which can cause serious burns even if on a low setting. Use fire-resistant mattress covers and pillows, and NEVER smoke in bed. Keep a phone next to the bed that is programmed to dial 911 at the push of a button. If you have a chronic condition, you may want to sign on with an automatic call-in service.  Typically the system includes a small pendant that connects directly to an emergency medical voice-response system. You should also make arrangements to stay in contact with someonefriend, neighbor, family memberon a regular schedule. Fire Prevention   According to the [employer] (Smoke Alarms for Every) [facility], senior citizens are one of the two highest risk groups for death and serious injuries due to residential fires. When cooking, wear short-sleeved items, never a bulky long-sleeved robe. The [facility]'s Safety Checklist for Older Consumers emphasizes the importance of checking basements, garages, workshops and storage areas for fire hazards, such as volatile liquids, piles of old rags or clothing and overloaded circuits. Never smoke in bed or when lying down on a couch or recliner chair. Small portable electric or kerosene heaters are responsible for many home fires and should be used cautiously if at all. If you do use one, be sure to keep them away from flammable materials. In case of fire, make sure you have a pre-established emergency exit plan. Have a professional check your fireplace and other fuel-burning appliances yearly. [employer]   Baby boomers entering the watson years will continue to see the development of new products to help older adults live safely and independently in spite of age-related changes. Making Life More Livable  , by Marc Farris, lists over 1,000 products for \"living well in the mature years,\" such as bathing and mobility aids, household security devices, ergonomically designed knives and peelers, and faucet valves and knobs for temperature control. Medical supply stores and organizations are good sources of information about products that improve your quality of life and insure your safety.      Last Reviewed: November 2009 Andrew Schulz MD   Updated: 3/7/2011     ·

## 2022-02-28 NOTE — PROGRESS NOTES
Medicare Annual Wellness Visit    Kita Mathew is here for Medicare [address] Chang was seen today for medicare awv. Diagnoses and all orders for this visit:    Medicare annual wellness visit, subsequent    Stage 3b chronic kidney disease ([facility] [address]  -     Comprehensive Metabolic Panel; Future    Primary hypertension  -     CBC; Future  -     Comprehensive Metabolic Panel; Future    Other hyperlipidemia  -     Lipid Panel; Future              Routine labs as listed above. Continue current medication regimen    declines pneumonia vaccine and flu vaccine    Hx of elevated psa. Likely prostate cancer and he declines further management and eval.      Recommendations for Preventive Services Due: see orders and patient instructions/AVS.  Recommended screening schedule for the next 5-10 years is provided to the patient in written form: see Patient Instructions/AVS.     Return in about 6 months (around 8/28/2022) for HTN, CKD. Subjective       Patient's complete Health Risk Assessment and screening values have been reviewed and are found in Flowsheets. The following problems were reviewed today and where indicated follow up appointments were made and/or referrals ordered. Positive Risk Factor Screenings with Interventions:     Cognitive:   Words recalled: 0 Words Recalled  Clock Drawing Test (CDT): (!) Abnormal  Total Score Interpretation: Abnormal Mini-Cog    Cognitive Impairment Interventions:  · Patient declines any further evaluation/treatment for cognitive impairment           General Health and ACP:  General  In general, how would you say your health is?: Very Good  In the past 7 days, have you experienced any of the following: New or Increased Pain, New or Increased Fatigue, Loneliness, Social Isolation, Stress or Anger?: No  Do you get the social and emotional support that you need?: Yes  Do you have a Living Will?: Yes    Advance Directives     Power of  Living Will ACP-Advance Directive ACP-Power of     Not on File Not on File Not on File Not on File      General Health Risk Interventions:  · n/a    Health Habits/Nutrition:     Physical Activity: Inactive    Days of Exercise per Week: 0 days    Minutes of Exercise per Session: 0 min     Have you lost any weight without trying in the past 3 months?: No  Body mass index: (!) 25.58  Have you seen the dentist within the past year?: N/A - wear dentures    Health Habits/Nutrition Interventions:  · Inadequate physical activity:  patient is not ready to increase his/her physical activity level at this time    Hearing/Vision:  Do you or your family notice any trouble with your hearing that hasn't been managed with hearing aids?: No  Do you have difficulty driving, watching TV, or doing any of your daily activities because of your eyesight?: (!) Yes  Have you had an eye exam within the past year?: (!) No  No exam data present    Hearing/Vision Interventions:  · Vision concerns:  patient declines any further evaluation/treatment for this issue    Safety:  Do you have working smoke detectors?: Yes  Do you have any tripping hazards - loose or unsecured carpets or rugs?: No  Do you have any tripping hazards - clutter in doorways, halls, or stairs?: No  Do you have either shower bars, grab bars, non-slip mats or non-slip surfaces in your shower or bathtub?: (!) No  Do all of your stairways have a railing or banister?: Yes  Do you always fasten your seatbelt when you are in a car?: Yes    Safety Interventions:  · Home safety tips provided    ADLs:  In the past 7 days, did you need help from others to perform any of the following everyday activities: Eating, dressing, grooming, bathing, toileting, or walking/balance?: No  In the past 7 days, did you need help from others to take care of any of the following: Laundry, housekeeping, banking/finances, shopping, telephone use, food preparation, transportation, or taking medications?: (!) Yes  Select all that apply: (!) Transportation    ADL Interventions:  · Patient declines any further evaluation/treatment for this issue          Objective   Vitals:    02/28/22 0857   BP: 132/72   Site: Right Upper Arm   Position: Sitting   Pulse: 99   Temp: 97.6 °F (36.4 °C)   SpO2: (!) 72%   Weight: 188 lb 9.6 oz (85.5 kg)   Height: 6' (1.829 m)      Body mass index is 25.58 kg/m². Physical Exam  Vitals reviewed. Constitutional:       General: He is not in acute distress. Appearance: He is well-developed. He is not ill-appearing or toxic-appearing. Cardiovascular:      Rate and Rhythm: Normal rate and regular rhythm. Heart sounds: No murmur heard. Pulmonary:      Effort: Pulmonary effort is normal. No respiratory distress. Breath sounds: Normal breath sounds. No wheezing. Musculoskeletal:      Right lower leg: No edema. Left lower leg: No edema. Neurological:      Mental Status: He is alert. Mental status is at baseline. Psychiatric:         Mood and Affect: Mood normal.         Behavior: Behavior normal.         Cognition and Memory: Memory is impaired. Allergies   Allergen Reactions    Asa [Aspirin]      bleeding     Prior to Visit Medications    Medication Sig Taking?  Authorizing Provider   simvastatin (ZOCOR) 5 MG tablet TAKE 1 TABLET EVERY NIGHT Yes Teri Kelsey MD   lisinopril (PRINIVIL;ZESTRIL) 10 MG tablet TAKE 1 TABLET EVERY DAY Yes Teri Kelsey MD   amLODIPine (NORVASC) 10 MG tablet TAKE 1 TABLET EVERY DAY Yes Teri Kelsey MD       [facility] (Including outside providers/suppliers regularly involved in providing care):   Patient Care Team:  Teri Kelsey MD as PCP - General (Family Medicine)  Teri Kelsey MD as PCP - [facility] Empaneled Provider    Reviewed and updated this visit:  Tobacco  Allergies  Meds  Problems  Med Hx  Surg Hx  Soc Hx  Fam Hx

## 2022-03-01 ENCOUNTER — HOSPITAL ENCOUNTER (OUTPATIENT)
Age: 87
Discharge: HOME | End: 2022-03-01
Payer: MEDICARE

## 2022-03-01 DIAGNOSIS — E78.49 OTHER HYPERLIPIDEMIA: ICD-10-CM

## 2022-03-01 DIAGNOSIS — N18.32 STAGE 3B CHRONIC KIDNEY DISEASE (HCC): ICD-10-CM

## 2022-03-01 DIAGNOSIS — I10 PRIMARY HYPERTENSION: ICD-10-CM

## 2022-03-01 LAB
ALBUMIN SERPL-MCNC: 3.8 G/DL (ref 3.5–5.1)
ALP BLD-CCNC: 221 U/L (ref 38–126)
ALT SERPL-CCNC: 9 U/L (ref 11–66)
ANION GAP SERPL CALCULATED.3IONS-SCNC: 10 MEQ/L (ref 8–16)
AST SERPL-CCNC: 18 U/L (ref 5–40)
BILIRUB SERPL-MCNC: 0.5 MG/DL (ref 0.3–1.2)
BUN BLDV-MCNC: 22 MG/DL (ref 7–22)
CALCIUM SERPL-MCNC: 8.9 MG/DL (ref 8.5–10.5)
CHLORIDE BLD-SCNC: 101 MEQ/L (ref 98–111)
CHOLESTEROL, TOTAL: 107 MG/DL (ref 100–199)
CO2: 27 MEQ/L (ref 23–33)
CREAT SERPL-MCNC: 1.7 MG/DL (ref 0.4–1.2)
ERYTHROCYTE [DISTWIDTH] IN BLOOD BY AUTOMATED COUNT: 13.7 % (ref 11.5–14.5)
ERYTHROCYTE [DISTWIDTH] IN BLOOD BY AUTOMATED COUNT: 47.8 FL (ref 35–45)
GFR SERPL CREATININE-BSD FRML MDRD: 38 ML/MIN/1.73M2
GLUCOSE BLD-MCNC: 81 MG/DL (ref 70–108)
HCT VFR BLD CALC: 40.8 % (ref 42–52)
HDLC SERPL-MCNC: 40 MG/DL
HEMOGLOBIN: 12.7 GM/DL (ref 14–18)
LDL CHOLESTEROL CALCULATED: 52 MG/DL
MCH RBC QN AUTO: 30 PG (ref 26–33)
MCHC RBC AUTO-ENTMCNC: 31.1 GM/DL (ref 32.2–35.5)
MCV RBC AUTO: 96.5 FL (ref 80–94)
PLATELET # BLD: 145 THOU/MM3 (ref 130–400)
PMV BLD AUTO: 9.9 FL (ref 9.4–12.4)
POTASSIUM SERPL-SCNC: 4.3 MEQ/L (ref 3.5–5.2)
RBC # BLD: 4.23 MILL/MM3 (ref 4.7–6.1)
SODIUM BLD-SCNC: 138 MEQ/L (ref 135–145)
TOTAL PROTEIN: 7.4 G/DL (ref 6.1–8)
TRIGL SERPL-MCNC: 75 MG/DL (ref 0–199)
WBC # BLD: 4.2 THOU/MM3 (ref 4.8–10.8)

## 2022-03-01 PROCEDURE — 36415 COLL VENOUS BLD VENIPUNCTURE: CPT

## 2022-03-01 PROCEDURE — 80061 LIPID PANEL: CPT

## 2022-03-01 PROCEDURE — 80053 COMPREHEN METABOLIC PANEL: CPT

## 2022-03-01 PROCEDURE — 85027 COMPLETE CBC AUTOMATED: CPT

## 2022-03-07 ENCOUNTER — TELEPHONE (OUTPATIENT)
Dept: FAMILY MEDICINE CLINIC | Age: 87
End: 2022-03-07

## 2022-03-07 ENCOUNTER — HOSPITAL ENCOUNTER (OUTPATIENT)
Age: 87
Discharge: HOME | End: 2022-03-07
Payer: MEDICARE

## 2022-03-07 DIAGNOSIS — R74.8 ELEVATED ALKALINE PHOSPHATASE LEVEL: Primary | ICD-10-CM

## 2022-03-07 DIAGNOSIS — R74.8 ELEVATED ALKALINE PHOSPHATASE LEVEL: ICD-10-CM

## 2022-03-07 PROCEDURE — 84080 ASSAY ALKALINE PHOSPHATASES: CPT

## 2022-03-07 PROCEDURE — 84075 ASSAY ALKALINE PHOSPHATASE: CPT

## 2022-03-07 PROCEDURE — 36415 COLL VENOUS BLD VENIPUNCTURE: CPT

## 2022-03-12 LAB — ALKALINE PHOSPHATASE ISOENZYMES: NORMAL

## 2022-03-16 ENCOUNTER — TELEPHONE (OUTPATIENT)
Dept: FAMILY MEDICINE CLINIC | Age: 87
End: 2022-03-16

## 2022-03-16 NOTE — TELEPHONE ENCOUNTER
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 No further eval needed. Please advise patient.   Teri Kelsey MD

## 2022-05-31 ENCOUNTER — OFFICE VISIT (OUTPATIENT)
Dept: FAMILY MEDICINE CLINIC | Age: 87
End: 2022-05-31
Payer: MEDICARE

## 2022-05-31 VITALS
OXYGEN SATURATION: 99 % | TEMPERATURE: 97.2 F | HEART RATE: 94 BPM | DIASTOLIC BLOOD PRESSURE: 68 MMHG | BODY MASS INDEX: 25.3 KG/M2 | HEIGHT: 72 IN | WEIGHT: 186.8 LBS | RESPIRATION RATE: 18 BRPM | SYSTOLIC BLOOD PRESSURE: 122 MMHG

## 2022-05-31 DIAGNOSIS — M75.51 ACUTE BURSITIS OF RIGHT SHOULDER: Primary | ICD-10-CM

## 2022-05-31 PROCEDURE — 20610 DRAIN/INJ JOINT/BURSA W/O US: CPT | Performed by: NURSE PRACTITIONER

## 2022-05-31 PROCEDURE — 1123F ACP DISCUSS/DSCN MKR DOCD: CPT | Performed by: NURSE PRACTITIONER

## 2022-05-31 PROCEDURE — 99213 OFFICE O/P EST LOW 20 MIN: CPT | Performed by: NURSE PRACTITIONER

## 2022-05-31 RX ORDER — METHYLPREDNISOLONE ACETATE 80 MG/ML
80 INJECTION, SUSPENSION INTRA-ARTICULAR; INTRALESIONAL; INTRAMUSCULAR; SOFT TISSUE ONCE
Status: COMPLETED | OUTPATIENT
Start: 2022-05-31 | End: 2022-05-31

## 2022-05-31 RX ORDER — BUPIVACAINE HYDROCHLORIDE 5 MG/ML
30 INJECTION, SOLUTION PERINEURAL ONCE
Status: COMPLETED | OUTPATIENT
Start: 2022-05-31 | End: 2022-05-31

## 2022-05-31 RX ORDER — COVID-19 ANTIGEN TEST
KIT MISCELLANEOUS
COMMUNITY
End: 2022-08-29

## 2022-05-31 RX ADMIN — BUPIVACAINE HYDROCHLORIDE 150 MG: 5 INJECTION, SOLUTION PERINEURAL at 10:49

## 2022-05-31 RX ADMIN — METHYLPREDNISOLONE ACETATE 80 MG: 80 INJECTION, SUSPENSION INTRA-ARTICULAR; INTRALESIONAL; INTRAMUSCULAR; SOFT TISSUE at 10:37

## 2022-05-31 ASSESSMENT — PATIENT HEALTH QUESTIONNAIRE - PHQ9
SUM OF ALL RESPONSES TO PHQ9 QUESTIONS 1 & 2: 0
2. FEELING DOWN, DEPRESSED OR HOPELESS: 0
1. LITTLE INTEREST OR PLEASURE IN DOING THINGS: 0
SUM OF ALL RESPONSES TO PHQ QUESTIONS 1-9: 0

## 2022-05-31 NOTE — PATIENT INSTRUCTIONS
Patient Education        Bursitis: Care Instructions  Your Care Instructions     A bursa is a small sac of fluid that helps the tissues around a joint slide over one another easily. Injury or overuse of a joint can cause pain, redness, and inflammation in the bursa (bursitis). Bursitis usually gets better if you avoid the activity that caused it. You can help prevent bursitis from coming back by doing stretching and strengthening exercises. You may also need tochange the way you do some activities. Follow-up care is a key part of your treatment and safety. Be sure to make and go to all appointments, and call your doctor if you are having problems. It's also a good idea to know your test results and keep alist of the medicines you take. How can you care for yourself at home?  Put ice or a cold pack on the area for 10 to 20 minutes at a time. Try to do this every 1 to 2 hours for the next 3 days (when you are awake) or until the swelling goes down. Put a thin cloth between the ice and your skin.  After the 3 days of using ice, you may use heat on the area. You can use a hot water bottle; a warm, moist towel; or a heating pad set on low. You can also try alternating heat and ice.  Rest the area where you have pain. Stop any activities that cause pain. Switch to activities that do not stress the area.  Take pain medicines exactly as directed. ? If the doctor gave you a prescription medicine for pain, take it as prescribed. ? If you are not taking a prescription pain medicine, ask your doctor if you can take an over-the-counter medicine. ? Do not take two or more pain medicines at the same time unless the doctor told you to. Many pain medicines have acetaminophen, which is Tylenol. Too much acetaminophen (Tylenol) can be harmful.  To prevent stiffness, gently move the joint as much as you can without pain every day. As the pain gets better, keep doing range-of-motion exercises.  Ask your doctor for exercises that will make the muscles around the joint stronger. Do these as directed.  You can slowly return to the activity that caused the pain, but do it with less effort until you can do it without pain or swelling. Be sure to warm up before and stretch after you do the activity. When should you call for help? Call your doctor now or seek immediate medical care if:     You have new or worse symptoms of infection, such as:  ? Increased pain, swelling, warmth, or redness. ? Red streaks leading from the area. ? Pus draining from the area. ? A fever. Watch closely for changes in your health, and be sure to contact your doctor if:     You do not get better as expected. Where can you learn more? Go to [email or web address] and sign in to your Spock account. Enter Y647 in the Asterisk box to learn more about \"Bursitis: Care Instructions. \"     If you do not have an account, please click on the \"Sign Up Now\" link. Current as of: July 1, 2021               Content Version: 13.2  © 1803-7266 [employer], [employer]orporated. Care instructions adapted under license by [facility] ([facility]). If you have questions about a medical condition or this instruction, always ask your healthcare professional. [address] any warranty or liability for your use of this information.

## 2022-05-31 NOTE — PROGRESS NOTES
Jong Freire (:  1933) is a 80 y.o. male,Established patient, here for evaluation of the following chief complaint(s):  Shoulder Pain (right x 1 week)         ASSESSMENT/PLAN:  1. Acute bursitis of right shoulder  Continue to use Aleve as needed. Return if symptoms worsen or fail to improve. Procedure:  Right shoulder injection  Verbal consent was received the right shoulder was cleansed with alcohol using a needle and sterile technique the needle was inserted into the right shoulder bursa and injected with 80 mg of Depo-Medrol and 4 cc of 0.5% bupivacaine. Hemostasis obtained patient tolerated well  Offered physical therapy he had declined at this time. Subjective   SUBJECTIVE/OBJECTIVE:  Shoulder Pain   The pain is present in the right shoulder. This is a new problem. The current episode started in the past 7 days. There has been no history of extremity trauma. The problem occurs constantly. The problem has been unchanged. The quality of the pain is described as aching. The pain is at a severity of 5/10. The pain is moderate. Associated symptoms include a limited range of motion and stiffness. Pertinent negatives include no fever, joint swelling, numbness or tingling. The symptoms are aggravated by activity (Lying on his right side). He has tried NSAIDS for the symptoms. The treatment provided no relief. His past medical history is significant for osteoarthritis. There is no history of gout or rheumatoid arthritis. He is right-hand-dominant male    Review of Systems   Constitutional: Positive for activity change. Negative for fever. Musculoskeletal: Positive for myalgias, neck pain, neck stiffness and stiffness. Negative for gout. Neurological: Negative for dizziness, tingling and numbness. Objective   Physical Exam  Constitutional:       General: He is not in acute distress. Appearance: Normal appearance. He is normal weight.    Cardiovascular:      Rate and Rhythm: Normal rate. Pulmonary:      Effort: Pulmonary effort is normal.   Musculoskeletal:      Comments: Right upper extremity was inspected his skin is intact there is no swelling or ecchymosis. He does have mild tenderness along the bicipital groove. His motion is limited 2/2 pain. Radial pulse 2+. SILT. Spurling's negative. Skin:     General: Skin is warm and dry. Neurological:      Mental Status: He is alert. An electronic signature was used to authenticate this note.     --Gian Mcbride, SEAMUS - CNP

## 2022-06-07 DIAGNOSIS — Z96.643 STATUS POST BILATERAL HIP REPLACEMENTS: Primary | ICD-10-CM

## 2022-06-15 DIAGNOSIS — I10 ESSENTIAL HYPERTENSION: ICD-10-CM

## 2022-06-15 NOTE — ED NOTES
Patient resting on cart with complaint of abdominal pain and headache. Patient denies nausea at this time. Patient states pain medication decreased abdominal pain. Patient updated on plan of care. Call light in reach. Side rails up times 2.      Tad Jimenez RN  07/14/18 6520 no cough/no exertional dyspnea/no shortness of breath Pfizer dose 1 and 2

## 2022-06-16 RX ORDER — AMLODIPINE BESYLATE 10 MG/1
TABLET ORAL
Qty: 90 TABLET | Refills: 1 | OUTPATIENT
Start: 2022-06-16

## 2022-08-08 DIAGNOSIS — E78.5 HYPERLIPIDEMIA, UNSPECIFIED HYPERLIPIDEMIA TYPE: ICD-10-CM

## 2022-08-08 DIAGNOSIS — I10 ESSENTIAL HYPERTENSION: ICD-10-CM

## 2022-08-08 RX ORDER — SIMVASTATIN 5 MG
TABLET ORAL
Qty: 90 TABLET | Refills: 1 | Status: SHIPPED | OUTPATIENT
Start: 2022-08-08

## 2022-08-08 RX ORDER — LISINOPRIL 10 MG/1
TABLET ORAL
Qty: 90 TABLET | Refills: 1 | Status: SHIPPED | OUTPATIENT
Start: 2022-08-08

## 2022-08-08 NOTE — TELEPHONE ENCOUNTER
Cinda Church called requesting a refill on the following medications:  Requested Prescriptions     Pending Prescriptions Disp Refills    simvastatin (ZOCOR) 5 MG tablet [Pharmacy Med Name: SIMVASTATIN 5 MG Tablet] 90 tablet 1     Sig: TAKE 1 TABLET EVERY NIGHT    lisinopril (PRINIVIL;ZESTRIL) 10 MG tablet [Pharmacy Med Name: LISINOPRIL 10 MG Tablet] 90 tablet 1     Sig: TAKE 1 TABLET EVERY DAY       Date of last visit: 5/31/2022  Date of next visit (if applicable):8/29/2022  Date of last refill:2*/7/2022  Pharmacy [address] Mail delivery      Thanks,  Alison Segovia MA

## 2022-08-20 DIAGNOSIS — I10 ESSENTIAL HYPERTENSION: ICD-10-CM

## 2022-08-22 RX ORDER — AMLODIPINE BESYLATE 10 MG/1
TABLET ORAL
Qty: 90 TABLET | Refills: 1 | Status: SHIPPED | OUTPATIENT
Start: 2022-08-22

## 2022-08-22 NOTE — TELEPHONE ENCOUNTER
Joel Horn is requesting a refill on the following medications:  Requested Prescriptions     Pending Prescriptions Disp Refills    amLODIPine (NORVASC) 10 MG tablet [Pharmacy Med Name: AMLODIPINE BESYLATE 10 MG Tablet] 90 tablet 1     Sig: TAKE 1 TABLET EVERY DAY       Date of last visit: 5/31/2022  Date of next visit (if applicable):8/29/2022  Date of last refill: 1/24/2022  Pharmacy Name: [facility]ingo [place]

## 2022-08-29 ENCOUNTER — OFFICE VISIT (OUTPATIENT)
Dept: FAMILY MEDICINE CLINIC | Age: 87
End: 2022-08-29
Payer: MEDICARE

## 2022-08-29 ENCOUNTER — HOSPITAL ENCOUNTER (OUTPATIENT)
Age: 87
Discharge: HOME | End: 2022-08-29
Payer: MEDICARE

## 2022-08-29 VITALS
TEMPERATURE: 97.4 F | RESPIRATION RATE: 16 BRPM | WEIGHT: 184 LBS | BODY MASS INDEX: 24.92 KG/M2 | DIASTOLIC BLOOD PRESSURE: 60 MMHG | SYSTOLIC BLOOD PRESSURE: 112 MMHG | HEART RATE: 90 BPM | OXYGEN SATURATION: 99 % | HEIGHT: 72 IN

## 2022-08-29 DIAGNOSIS — N18.32 STAGE 3B CHRONIC KIDNEY DISEASE (HCC): ICD-10-CM

## 2022-08-29 DIAGNOSIS — I10 ESSENTIAL HYPERTENSION: Primary | ICD-10-CM

## 2022-08-29 DIAGNOSIS — E78.5 HYPERLIPIDEMIA, UNSPECIFIED HYPERLIPIDEMIA TYPE: ICD-10-CM

## 2022-08-29 DIAGNOSIS — I10 ESSENTIAL HYPERTENSION: ICD-10-CM

## 2022-08-29 LAB
ALBUMIN SERPL-MCNC: 4.1 G/DL (ref 3.5–5.1)
ALP BLD-CCNC: 190 U/L (ref 38–126)
ALT SERPL-CCNC: 8 U/L (ref 11–66)
ANION GAP SERPL CALCULATED.3IONS-SCNC: 14 MEQ/L (ref 8–16)
AST SERPL-CCNC: 17 U/L (ref 5–40)
BILIRUB SERPL-MCNC: 0.5 MG/DL (ref 0.3–1.2)
BUN BLDV-MCNC: 22 MG/DL (ref 7–22)
CALCIUM SERPL-MCNC: 9.2 MG/DL (ref 8.5–10.5)
CHLORIDE BLD-SCNC: 101 MEQ/L (ref 98–111)
CO2: 23 MEQ/L (ref 23–33)
CREAT SERPL-MCNC: 1.8 MG/DL (ref 0.4–1.2)
ERYTHROCYTE [DISTWIDTH] IN BLOOD BY AUTOMATED COUNT: 13.2 % (ref 11.5–14.5)
ERYTHROCYTE [DISTWIDTH] IN BLOOD BY AUTOMATED COUNT: 47.3 FL (ref 35–45)
GFR SERPL CREATININE-BSD FRML MDRD: 36 ML/MIN/1.73M2
GLUCOSE BLD-MCNC: 86 MG/DL (ref 70–108)
HCT VFR BLD CALC: 41.5 % (ref 42–52)
HEMOGLOBIN: 13 GM/DL (ref 14–18)
MCH RBC QN AUTO: 30.2 PG (ref 26–33)
MCHC RBC AUTO-ENTMCNC: 31.3 GM/DL (ref 32.2–35.5)
MCV RBC AUTO: 96.5 FL (ref 80–94)
PLATELET # BLD: 142 THOU/MM3 (ref 130–400)
PMV BLD AUTO: 9.5 FL (ref 9.4–12.4)
POTASSIUM SERPL-SCNC: 4.4 MEQ/L (ref 3.5–5.2)
RBC # BLD: 4.3 MILL/MM3 (ref 4.7–6.1)
SODIUM BLD-SCNC: 138 MEQ/L (ref 135–145)
TOTAL PROTEIN: 7.7 G/DL (ref 6.1–8)
WBC # BLD: 4.9 THOU/MM3 (ref 4.8–10.8)

## 2022-08-29 PROCEDURE — G8420 CALC BMI NORM PARAMETERS: HCPCS | Performed by: FAMILY MEDICINE

## 2022-08-29 PROCEDURE — 99214 OFFICE O/P EST MOD 30 MIN: CPT | Performed by: FAMILY MEDICINE

## 2022-08-29 PROCEDURE — G8427 DOCREV CUR MEDS BY ELIG CLIN: HCPCS | Performed by: FAMILY MEDICINE

## 2022-08-29 PROCEDURE — 1036F TOBACCO NON-USER: CPT | Performed by: FAMILY MEDICINE

## 2022-08-29 PROCEDURE — 36415 COLL VENOUS BLD VENIPUNCTURE: CPT

## 2022-08-29 PROCEDURE — 85027 COMPLETE CBC AUTOMATED: CPT

## 2022-08-29 PROCEDURE — 80053 COMPREHEN METABOLIC PANEL: CPT

## 2022-08-29 PROCEDURE — 1123F ACP DISCUSS/DSCN MKR DOCD: CPT | Performed by: FAMILY MEDICINE

## 2022-08-29 SDOH — ECONOMIC STABILITY: FOOD INSECURITY: WITHIN THE PAST 12 MONTHS, THE FOOD YOU BOUGHT JUST DIDN'T LAST AND YOU DIDN'T HAVE MONEY TO GET MORE.: NEVER TRUE

## 2022-08-29 SDOH — ECONOMIC STABILITY: FOOD INSECURITY: WITHIN THE PAST 12 MONTHS, YOU WORRIED THAT YOUR FOOD WOULD RUN OUT BEFORE YOU GOT MONEY TO BUY MORE.: NEVER TRUE

## 2022-08-29 ASSESSMENT — ENCOUNTER SYMPTOMS
SHORTNESS OF BREATH: 0
WHEEZING: 0

## 2022-08-29 ASSESSMENT — SOCIAL DETERMINANTS OF HEALTH (SDOH): HOW HARD IS IT FOR YOU TO PAY FOR THE VERY BASICS LIKE FOOD, HOUSING, MEDICAL CARE, AND HEATING?: NOT HARD AT ALL

## 2022-08-29 NOTE — PROGRESS NOTES
[facility]  [address]  [facility] [facility] [id number]  Dept: [phone]  Dept Fax: [phone]  Loc: [phone]  PROGRESS NOTE      Visit Date: 8/29/2022    Carlton Dove is a 80 y.o. male who presents today for:  Chief Complaint   Patient presents with    6 Month Follow-Up    Hypertension    Hip Pain     Pt is having right hip pain month or so        Subjective:  Hypertension  Pertinent negatives include no chest pain or shortness of breath. Hip Pain        6-month follow-up     hypertension: On Norvasc and lisinopril. Has CKD stage III. Home SBP in 130-140s. Hyperlipidemia: On Zocor. No myalgias. LDL 52 in March. Gets intermittent right hip pain. No pain today. Hx of prosthesis. He has been taking aleve. No concerns    Wife and son are present    Review of Systems   Respiratory:  Negative for shortness of breath and wheezing. Cardiovascular:  Negative for chest pain. Musculoskeletal:  Positive for arthralgias. Neurological:  Negative for dizziness and syncope.    Patient Active Problem List   Diagnosis    Hyperlipidemia    Osteoarthritis    CRF (chronic renal failure)    Primary osteoarthritis of left hip    HTN (hypertension)    Cholecystitis    Stage 3 chronic kidney disease (HCC)    Elevated alkaline phosphatase level    Constipation    Enlarged prostate    Splenomegaly    Urinary retention    Mild aortic regurgitation    Mild mitral regurgitation    Mild tricuspid regurgitation    S/P laparoscopic cholecystectomy    S/P right inguinal hernia repair     Past Medical History:   Diagnosis Date    Hyperlipidemia     Hypertension     Osteoarthritis     PAC (premature atrial contraction)     Paget disease of bone     S/P laparoscopic cholecystectomy 7/18/2018    S/P right inguinal hernia repair 7/18/2018      Past Surgical History:   Procedure Laterality Date    JOINT REPLACEMENT Bilateral 2013    HIP    [place] LAP,CHOLECYSTECTOMY N/A 7/16/2018    CHOLECYSTECTOMY LAPAROSCOPIC performed by Julisa De MD at [address] [place] [address] Right 7/16/2018    Right HERNIA INGUINAL REPAIR performed by Julisa De MD at [facility] History   Problem Relation Age of Onset    Diabetes Mother     Stroke Father     Stroke Sister     Cancer Brother     Heart Disease Brother     Stroke Brother      Social History     Tobacco Use    Smoking status: Never    Smokeless tobacco: Never   Substance Use Topics    Alcohol use: No      Current Outpatient Medications   Medication Sig Dispense Refill    amLODIPine (NORVASC) 10 MG tablet TAKE 1 TABLET EVERY DAY 90 tablet 1    simvastatin (ZOCOR) 5 MG tablet TAKE 1 TABLET EVERY NIGHT 90 tablet 1    lisinopril (PRINIVIL;ZESTRIL) 10 MG tablet TAKE 1 TABLET EVERY DAY 90 tablet 1    Handicap Placard MISC by Does not apply route Dx:  S/p hip replacement; duration 5 years 1 each 0    Naproxen Sodium 220 MG CAPS Take by mouth      diclofenac sodium (VOLTAREN) 1 % GEL Apply topically 2 times daily       No current facility-administered medications for this visit.      Allergies   Allergen Reactions    Asa [Aspirin]      bleeding     Health Maintenance   Topic Date Due    DTaP/Tdap/Td vaccine (1 - Tdap) Never done    Shingles vaccine (1 of 2) Never done    Pneumococcal 65+ years Vaccine (1 - PCV) Never done    Prostate Specific Antigen (PSA) Screening or Monitoring  11/01/2019    COVID-19 Vaccine (4 - Booster for Moderna series) 03/18/2022    Flu vaccine (1) 09/01/2022    Lipids  03/01/2023    Annual Wellness Visit (AWV)  03/01/2023    Depression Screen  05/31/2023    Hepatitis A vaccine  Aged Out    Hepatitis B vaccine  Aged Out    Hib vaccine  Aged Out    Meningococcal (ACWY) vaccine  Aged Out       Objective:  /60   Pulse 90   Temp 97.4 °F (36.3 °C)   Resp 16   Ht 6' (1.829 m)   Wt 184 lb (83.5 kg)   SpO2 99%   BMI 24.95 kg/m²   Physical Exam  Vitals reviewed. Constitutional:       General: He is not in acute distress. Appearance: He is well-developed. He is not ill-appearing or toxic-appearing. Cardiovascular:      Rate and Rhythm: Normal rate and regular rhythm. Heart sounds: No murmur heard. Pulmonary:      Effort: Pulmonary effort is normal. No respiratory distress. Breath sounds: Normal breath sounds. No wheezing. Musculoskeletal:      Right lower leg: No edema. Left lower leg: No edema. Neurological:      Mental Status: He is alert. Mental status is at baseline. Psychiatric:         Mood and Affect: Mood normal.         Behavior: Behavior normal.       Impression/Plan:  1. Essential hypertension  Chronic. Controlled. Continue Norvasc and lisinopril. Check labs  - Comprehensive Metabolic Panel; Future  - CBC; Future    2. Hyperlipidemia, unspecified hyperlipidemia type  Chronic. Stable. Continue simvastatin    3. Stage 3b chronic kidney disease (HCC)  Chronic. Check status of control with CMP. Avoid oral NSAIDs: discussed in detail including stopping Aleve. May use Tylenol for pain. - Comprehensive Metabolic Panel; Future      He declines pneumonia vaccine    They voiced understanding. All questions answered. They agreed with treatment plan. See patient instructions for any educational materials that may have been given. Discussed use, benefit, and side effects of prescribed medications. Reviewed health maintenance. (Please note that portions of this note may have been completed with a voice recognition program.  Efforts were made to edit the dictation but occasionally words are mis-transcribed.)    Return in about 6 months (around 2/28/2023) for medicare wellness.       Electronically signed by Nohemy Alfonso MD on 8/29/2022 at 8:24 AM

## 2022-10-06 ENCOUNTER — OFFICE VISIT (OUTPATIENT)
Dept: FAMILY MEDICINE CLINIC | Age: 87
End: 2022-10-06
Payer: MEDICARE

## 2022-10-06 ENCOUNTER — HOSPITAL ENCOUNTER (OUTPATIENT)
Age: 87
Discharge: HOME | End: 2022-10-06
Payer: MEDICARE

## 2022-10-06 ENCOUNTER — HOSPITAL ENCOUNTER (OUTPATIENT)
Dept: GENERAL RADIOLOGY | Age: 87
Discharge: HOME | End: 2022-10-06
Payer: MEDICARE

## 2022-10-06 VITALS
HEIGHT: 72 IN | RESPIRATION RATE: 16 BRPM | BODY MASS INDEX: 25.09 KG/M2 | SYSTOLIC BLOOD PRESSURE: 134 MMHG | TEMPERATURE: 97 F | DIASTOLIC BLOOD PRESSURE: 72 MMHG | OXYGEN SATURATION: 99 % | WEIGHT: 185.2 LBS | HEART RATE: 85 BPM

## 2022-10-06 DIAGNOSIS — G89.29 CHRONIC RIGHT SHOULDER PAIN: ICD-10-CM

## 2022-10-06 DIAGNOSIS — M19.011 ARTHRITIS OF RIGHT SHOULDER REGION: ICD-10-CM

## 2022-10-06 DIAGNOSIS — M25.511 CHRONIC RIGHT SHOULDER PAIN: ICD-10-CM

## 2022-10-06 DIAGNOSIS — G89.29 CHRONIC RIGHT SHOULDER PAIN: Primary | ICD-10-CM

## 2022-10-06 DIAGNOSIS — M25.511 CHRONIC RIGHT SHOULDER PAIN: Primary | ICD-10-CM

## 2022-10-06 PROCEDURE — G8427 DOCREV CUR MEDS BY ELIG CLIN: HCPCS | Performed by: FAMILY MEDICINE

## 2022-10-06 PROCEDURE — 73030 X-RAY EXAM OF SHOULDER: CPT

## 2022-10-06 PROCEDURE — 1123F ACP DISCUSS/DSCN MKR DOCD: CPT | Performed by: FAMILY MEDICINE

## 2022-10-06 PROCEDURE — 1036F TOBACCO NON-USER: CPT | Performed by: FAMILY MEDICINE

## 2022-10-06 PROCEDURE — G8417 CALC BMI ABV UP PARAM F/U: HCPCS | Performed by: FAMILY MEDICINE

## 2022-10-06 PROCEDURE — 20610 DRAIN/INJ JOINT/BURSA W/O US: CPT | Performed by: FAMILY MEDICINE

## 2022-10-06 PROCEDURE — G8484 FLU IMMUNIZE NO ADMIN: HCPCS | Performed by: FAMILY MEDICINE

## 2022-10-06 RX ORDER — BUPIVACAINE HYDROCHLORIDE 5 MG/ML
4 INJECTION, SOLUTION PERINEURAL ONCE
Status: COMPLETED | OUTPATIENT
Start: 2022-10-06 | End: 2022-10-06

## 2022-10-06 RX ORDER — LIDOCAINE HYDROCHLORIDE 10 MG/ML
4 INJECTION, SOLUTION INFILTRATION; PERINEURAL ONCE
Status: COMPLETED | OUTPATIENT
Start: 2022-10-06 | End: 2022-10-06

## 2022-10-06 RX ORDER — TRIAMCINOLONE ACETONIDE 40 MG/ML
80 INJECTION, SUSPENSION INTRA-ARTICULAR; INTRAMUSCULAR ONCE
Status: COMPLETED | OUTPATIENT
Start: 2022-10-06 | End: 2022-10-06

## 2022-10-06 RX ADMIN — TRIAMCINOLONE ACETONIDE 80 MG: 40 INJECTION, SUSPENSION INTRA-ARTICULAR; INTRAMUSCULAR at 14:23

## 2022-10-06 RX ADMIN — LIDOCAINE HYDROCHLORIDE 4 ML: 10 INJECTION, SOLUTION INFILTRATION; PERINEURAL at 14:23

## 2022-10-06 RX ADMIN — BUPIVACAINE HYDROCHLORIDE 20 MG: 5 INJECTION, SOLUTION PERINEURAL at 14:24

## 2022-10-06 NOTE — PROGRESS NOTES
[facility] PROFESSIONAL SERVS  [facility]  [address] [facility]  Dept: [phone]  Dept Fax: [phone]  Loc: [phone]  PROGRESS NOTE      Visit Date: 10/6/2022    Ruy Chou is a 80 y.o. male who presents today for:  Chief Complaint   Patient presents with    Shoulder Pain     Right shoulder has been bothering pt for a while would like injection       Subjective:  HPI    Right shoulder pain. Steroid injection in may. Worsening pain over past few weeks. Pain with lifting arm. Feels weak. Takes aleve which helps. xray in 2013 showed arthritis. Pain is 5/10. No previous shoulder surgery. right hand dominant    Wife and son are present    Review of Systems   Constitutional:  Negative for chills and fever. Musculoskeletal:  Positive for arthralgias. Neurological:  Positive for weakness.    Patient Active Problem List   Diagnosis    Hyperlipidemia    Osteoarthritis    CRF (chronic renal failure)    Primary osteoarthritis of left hip    HTN (hypertension)    Cholecystitis    Stage 3 chronic kidney disease (HCC)    Elevated alkaline phosphatase level    Constipation    Enlarged prostate    Splenomegaly    Urinary retention    Mild aortic regurgitation    Mild mitral regurgitation    Mild tricuspid regurgitation    S/P laparoscopic cholecystectomy    S/P right inguinal hernia repair     Past Medical History:   Diagnosis Date    Hyperlipidemia     Hypertension     Osteoarthritis     PAC (premature atrial contraction)     Paget disease of bone     S/P laparoscopic cholecystectomy 7/18/2018    S/P right inguinal hernia repair 7/18/2018      Past Surgical History:   Procedure Laterality Date    JOINT REPLACEMENT Bilateral 2013    HIP    [place] LAP,CHOLECYSTECTOMY N/A 7/16/2018    CHOLECYSTECTOMY LAPAROSCOPIC performed by Cally Mcdermott MD at [address] Right 7/16/2018    Right HERNIA INGUINAL REPAIR performed by Maricruz Anderson Yoly Evans MD at [address] [facility] History   Problem Relation Age of Onset    Diabetes Mother     Stroke Father     Stroke Sister     Cancer Brother     Heart Disease Brother     Stroke Brother      Social History     Tobacco Use    Smoking status: Never    Smokeless tobacco: Never   Substance Use Topics    Alcohol use: No      Current Outpatient Medications   Medication Sig Dispense Refill    amLODIPine (NORVASC) 10 MG tablet TAKE 1 TABLET EVERY DAY 90 tablet 1    simvastatin (ZOCOR) 5 MG tablet TAKE 1 TABLET EVERY NIGHT 90 tablet 1    lisinopril (PRINIVIL;ZESTRIL) 10 MG tablet TAKE 1 TABLET EVERY DAY 90 tablet 1    Handicap Placard MISC by Does not apply route Dx:  S/p hip replacement; duration 5 years 1 each 0    diclofenac sodium (VOLTAREN) 1 % GEL Apply topically 2 times daily       No current facility-administered medications for this visit. Allergies   Allergen Reactions    Asa [Aspirin]      bleeding     Health Maintenance   Topic Date Due    DTaP/Tdap/Td vaccine (1 - Tdap) Never done    Shingles vaccine (1 of 2) Never done    Pneumococcal 65+ years Vaccine (1 - PCV) Never done    Prostate Specific Antigen (PSA) Screening or Monitoring  11/01/2019    COVID-19 Vaccine (4 - Booster for Moderna series) 03/18/2022    Flu vaccine (1) Never done    Lipids  03/01/2023    Annual Wellness Visit (AWV)  03/01/2023    Depression Screen  05/31/2023    Hepatitis A vaccine  Aged Out    Hepatitis B vaccine  Aged Out    Hib vaccine  Aged Out    Meningococcal (ACWY) vaccine  Aged Out       Objective:  /72   Pulse 85   Temp 97 °F (36.1 °C)   Resp 16   Ht 6' (1.829 m)   Wt 185 lb 3.2 oz (84 kg)   SpO2 99%   BMI 25.12 kg/m²   Physical Exam  Vitals reviewed. Constitutional:       General: He is not in acute distress. Appearance: He is not ill-appearing. Pulmonary:      Effort: Pulmonary effort is normal. No respiratory distress. Neurological:      Mental Status: He is alert.       Gait: Gait abnormal.   Psychiatric:         Mood and Affect: Affect is flat. Behavior: Behavior is slowed. Right shoulder: Tenderness of the anterior glenohumeral and subacromial area. Abduction to 90 degrees. Decreased external rotation and internal rotation. 3/5 strength for external rotation and abduction. 4/5 strength for internal rotation. Positive Arita impingement test.  No erythema or ecchymosis    Right shoulder x-ray was obtained and reviewed today. He return to the office after obtaining the x-ray. X-ray shows glenohumeral and AC joint arthritis. No fracture. Radiology read is pending at the time patient was seen    Procedure Note Right Shoulder Injection    Discussed risks and benefits of steroid injection, including but not limited to infection, skin discoloration, pain, and bleeding. With the patient's verbal informed consent, his right shoulder was prepped in standard sterile fashion with Betadine and Alcohol. Ethyl chloride was used to anesthetize the skin. A mixture of 4 cc of 0.5% Bupivacaine, 4 cc of 1% Lidocaine,  and 2 cc of Kenalog 40 mg was injected into the right shoulder using a posterior-lateral approach. The patient tolerated this well without difficulty. A band-aid was applied and the patient was advised to ice the shoulder for 15-20 minutes to relieve any injection site related pain. Impression/Plan:  1. Chronic right shoulder pain  Chronic right shoulder pain secondary to arthritis. May use over-the-counter medications such as Tylenol and topical analgesics. Recommend home exercise program to maintain motion which he declines. Steroid injection was performed as described above. Ice as needed. - XR SHOULDER RIGHT (MIN 2 VIEWS); Future  - [place] ARTHROCENTESIS ASPIR&/INJ MAJOR JT/BURSA W/O US  - triamcinolone acetonide (KENALOG-40) injection 80 mg  - bupivacaine (MARCAINE) 0.5 % injection 20 mg  - lidocaine 1 % injection 4 mL    2.  Arthritis of right shoulder region  - XR SHOULDER RIGHT (MIN 2 VIEWS); Future  - [place] ARTHROCENTESIS ASPIR&/INJ MAJOR JT/BURSA W/O US  - triamcinolone acetonide (KENALOG-40) injection 80 mg  - bupivacaine (MARCAINE) 0.5 % injection 20 mg  - lidocaine 1 % injection 4 mL    They voiced understanding. All questions answered. They agreed with treatment plan. See patient instructions for any educational materials that may have been given. Discussed use, benefit, and side effects of prescribed medications. Reviewed health maintenance. (Please note that portions of this note may have been completed with a voice recognition program.  Efforts were made to edit the dictation but occasionally words are mis-transcribed.)    Return if symptoms worsen or fail to improve.       Electronically signed by Sd Mendez MD on 10/6/2022 at 1:35 PM

## 2023-03-06 ENCOUNTER — OFFICE VISIT (OUTPATIENT)
Dept: FAMILY MEDICINE CLINIC | Age: 88
End: 2023-03-06
Payer: MEDICARE

## 2023-03-06 ENCOUNTER — HOSPITAL ENCOUNTER (OUTPATIENT)
Age: 88
Discharge: HOME | End: 2023-03-06
Payer: MEDICARE

## 2023-03-06 VITALS
DIASTOLIC BLOOD PRESSURE: 82 MMHG | RESPIRATION RATE: 18 BRPM | SYSTOLIC BLOOD PRESSURE: 138 MMHG | OXYGEN SATURATION: 99 % | BODY MASS INDEX: 24.38 KG/M2 | HEIGHT: 72 IN | WEIGHT: 180 LBS | TEMPERATURE: 97.3 F | HEART RATE: 101 BPM

## 2023-03-06 DIAGNOSIS — N18.32 STAGE 3B CHRONIC KIDNEY DISEASE (HCC): ICD-10-CM

## 2023-03-06 DIAGNOSIS — I10 ESSENTIAL HYPERTENSION: ICD-10-CM

## 2023-03-06 DIAGNOSIS — Z00.00 MEDICARE ANNUAL WELLNESS VISIT, SUBSEQUENT: Primary | ICD-10-CM

## 2023-03-06 DIAGNOSIS — E78.5 HYPERLIPIDEMIA, UNSPECIFIED HYPERLIPIDEMIA TYPE: ICD-10-CM

## 2023-03-06 DIAGNOSIS — R74.8 ELEVATED ALKALINE PHOSPHATASE LEVEL: ICD-10-CM

## 2023-03-06 DIAGNOSIS — M88.9 PAGET DISEASE OF BONE: ICD-10-CM

## 2023-03-06 LAB
ALBUMIN SERPL BCG-MCNC: 3.8 G/DL (ref 3.5–5.1)
ALP SERPL-CCNC: 179 U/L (ref 38–126)
ALT SERPL W/O P-5'-P-CCNC: 8 U/L (ref 11–66)
ANION GAP SERPL CALC-SCNC: 10 MEQ/L (ref 8–16)
AST SERPL-CCNC: 15 U/L (ref 5–40)
BASOPHILS ABSOLUTE: 0 THOU/MM3 (ref 0–0.1)
BASOPHILS NFR BLD AUTO: 0.4 %
BILIRUB SERPL-MCNC: 0.4 MG/DL (ref 0.3–1.2)
BUN SERPL-MCNC: 23 MG/DL (ref 7–22)
CALCIUM SERPL-MCNC: 8.9 MG/DL (ref 8.5–10.5)
CHLORIDE SERPL-SCNC: 101 MEQ/L (ref 98–111)
CO2 SERPL-SCNC: 25 MEQ/L (ref 23–33)
CREAT SERPL-MCNC: 1.5 MG/DL (ref 0.4–1.2)
DEPRECATED RDW RBC AUTO: 46.9 FL (ref 35–45)
EOSINOPHIL NFR BLD AUTO: 0.8 %
EOSINOPHILS ABSOLUTE: 0 THOU/MM3 (ref 0–0.4)
ERYTHROCYTE [DISTWIDTH] IN BLOOD BY AUTOMATED COUNT: 13.2 % (ref 11.5–14.5)
GFR SERPL CREATININE-BSD FRML MDRD: 44 ML/MIN/1.73M2
GLUCOSE SERPL-MCNC: 77 MG/DL (ref 70–108)
HCT VFR BLD AUTO: 39 % (ref 42–52)
HGB BLD-MCNC: 12.5 GM/DL (ref 14–18)
IMM GRANULOCYTES # BLD AUTO: 0.02 THOU/MM3 (ref 0–0.07)
IMM GRANULOCYTES NFR BLD AUTO: 0.4 %
LYMPHOCYTES ABSOLUTE: 0.7 THOU/MM3 (ref 1–4.8)
LYMPHOCYTES NFR BLD AUTO: 14.6 %
MCH RBC QN AUTO: 30.9 PG (ref 26–33)
MCHC RBC AUTO-ENTMCNC: 32.1 GM/DL (ref 32.2–35.5)
MCV RBC AUTO: 96.5 FL (ref 80–94)
MONOCYTES ABSOLUTE: 0.4 THOU/MM3 (ref 0.4–1.3)
MONOCYTES NFR BLD AUTO: 8.6 %
NEUTROPHILS NFR BLD AUTO: 75.2 %
NRBC BLD AUTO-RTO: 0 /100 WBC
PLATELET # BLD AUTO: 157 THOU/MM3 (ref 130–400)
PMV BLD AUTO: 9.6 FL (ref 9.4–12.4)
POTASSIUM SERPL-SCNC: 4 MEQ/L (ref 3.5–5.2)
PROT SERPL-MCNC: 7.6 G/DL (ref 6.1–8)
RBC # BLD AUTO: 4.04 MILL/MM3 (ref 4.7–6.1)
SEGMENTED NEUTROPHILS ABSOLUTE COUNT: 3.7 THOU/MM3 (ref 1.8–7.7)
SODIUM SERPL-SCNC: 136 MEQ/L (ref 135–145)
WBC # BLD AUTO: 4.9 THOU/MM3 (ref 4.8–10.8)

## 2023-03-06 PROCEDURE — G8484 FLU IMMUNIZE NO ADMIN: HCPCS | Performed by: FAMILY MEDICINE

## 2023-03-06 PROCEDURE — 1123F ACP DISCUSS/DSCN MKR DOCD: CPT | Performed by: FAMILY MEDICINE

## 2023-03-06 PROCEDURE — 80053 COMPREHEN METABOLIC PANEL: CPT

## 2023-03-06 PROCEDURE — 36415 COLL VENOUS BLD VENIPUNCTURE: CPT

## 2023-03-06 PROCEDURE — 85025 COMPLETE CBC W/AUTO DIFF WBC: CPT

## 2023-03-06 PROCEDURE — G0439 PPPS, SUBSEQ VISIT: HCPCS | Performed by: FAMILY MEDICINE

## 2023-03-06 RX ORDER — LISINOPRIL 10 MG/1
10 TABLET ORAL DAILY
Qty: 90 TABLET | Refills: 3 | Status: SHIPPED | OUTPATIENT
Start: 2023-03-06

## 2023-03-06 RX ORDER — SIMVASTATIN 5 MG
5 TABLET ORAL NIGHTLY
Qty: 90 TABLET | Refills: 3 | Status: SHIPPED | OUTPATIENT
Start: 2023-03-06

## 2023-03-06 RX ORDER — AMLODIPINE BESYLATE 10 MG/1
10 TABLET ORAL DAILY
Qty: 90 TABLET | Refills: 3 | Status: SHIPPED | OUTPATIENT
Start: 2023-03-06

## 2023-03-06 SDOH — ECONOMIC STABILITY: FOOD INSECURITY: WITHIN THE PAST 12 MONTHS, THE FOOD YOU BOUGHT JUST DIDN'T LAST AND YOU DIDN'T HAVE MONEY TO GET MORE.: NEVER TRUE

## 2023-03-06 SDOH — ECONOMIC STABILITY: HOUSING INSECURITY
IN THE LAST 12 MONTHS, WAS THERE A TIME WHEN YOU DID NOT HAVE A STEADY PLACE TO SLEEP OR SLEPT IN A SHELTER (INCLUDING NOW)?: NO

## 2023-03-06 SDOH — ECONOMIC STABILITY: INCOME INSECURITY: HOW HARD IS IT FOR YOU TO PAY FOR THE VERY BASICS LIKE FOOD, HOUSING, MEDICAL CARE, AND HEATING?: NOT HARD AT ALL

## 2023-03-06 SDOH — ECONOMIC STABILITY: FOOD INSECURITY: WITHIN THE PAST 12 MONTHS, YOU WORRIED THAT YOUR FOOD WOULD RUN OUT BEFORE YOU GOT MONEY TO BUY MORE.: NEVER TRUE

## 2023-03-06 ASSESSMENT — LIFESTYLE VARIABLES
HOW OFTEN DO YOU HAVE A DRINK CONTAINING ALCOHOL: NEVER
HOW MANY STANDARD DRINKS CONTAINING ALCOHOL DO YOU HAVE ON A TYPICAL DAY: PATIENT DOES NOT DRINK

## 2023-03-06 ASSESSMENT — PATIENT HEALTH QUESTIONNAIRE - PHQ9
SUM OF ALL RESPONSES TO PHQ QUESTIONS 1-9: 0
SUM OF ALL RESPONSES TO PHQ QUESTIONS 1-9: 0
2. FEELING DOWN, DEPRESSED OR HOPELESS: 0
SUM OF ALL RESPONSES TO PHQ QUESTIONS 1-9: 0
SUM OF ALL RESPONSES TO PHQ9 QUESTIONS 1 & 2: 0
SUM OF ALL RESPONSES TO PHQ QUESTIONS 1-9: 0
1. LITTLE INTEREST OR PLEASURE IN DOING THINGS: 0

## 2023-03-06 NOTE — PROGRESS NOTES
Medicare Annual Wellness Visit    Dianna Schulz is here for Medicare AWV (Legs are weak and given out and pain in left hip)    Assessment & Plan   Medicare annual wellness visit, subsequent  Essential hypertension  -     amLODIPine (NORVASC) 10 MG tablet; Take 1 tablet by mouth daily, Disp-90 tablet, R-3Normal  -     lisinopril (PRINIVIL;ZESTRIL) 10 MG tablet; Take 1 tablet by mouth daily, Disp-90 tablet, R-3Normal  -     CBC with Auto Differential; Future  -     Comprehensive Metabolic Panel; Future  Hyperlipidemia, unspecified hyperlipidemia type  -     simvastatin (ZOCOR) 5 MG tablet; Take 1 tablet by mouth nightly, Disp-90 tablet, R-3Normal  Stage 3b chronic kidney disease ([address]  -     CBC with Auto Differential; Future  -     Comprehensive Metabolic Panel; Future  Elevated alkaline phosphatase level  Paget disease of bone        Chronic stable problems as above. Refill medications and check labs. Declines flu and pneumonia vaccines  Recommend shingles vaccine    He declines eval  management of left knee. Decliens xray    Recommendations for Preventive Services Due: see orders and patient instructions/AVS.  Recommended screening schedule for the next 5-10 years is provided to the patient in written form: see Patient Instructions/AVS.     Return in about 6 months (around 9/6/2023) for HTN. Subjective       Left hip pain which he clarifies as his knee pain. S/p LISHA bilateral in about 2013. No hip pain currently. Does not walk much      Son and wife is present    Patient's complete Health Risk Assessment and screening values have been reviewed and are found in Flowsheets. The following problems were reviewed today and where indicated follow up appointments were made and/or referrals ordered.     Positive Risk Factor Screenings with Interventions:    Fall Risk:  Do you feel unsteady or are you worried about falling? : (!) yes  2 or more falls in past year?: (!) yes  Fall with injury in past year?: no Interventions:    Patient declines any further evaluation or treatment    Cognitive: Words recalled: 2 Words Recalled   Clock Drawing Test (CDT): (!) Abnormal   Total Score: (!) 2   Total Score Interpretation: Abnormal Mini-Cog      Interventions:  Patient declines any further evaluation or treatment           General HRA Questions:  Select all that apply: (!) New or Increased Pain    Pain Interventions:  Patient comments: intermittent left knee pain. Weight and Activity:  Physical Activity: Inactive    Days of Exercise per Week: 0 days    Minutes of Exercise per Session: 0 min     On average, how many days per week do you engage in moderate to strenuous exercise (like a brisk walk)?: 0 days  Have you lost any weight without trying in the past 3 months?: No  Body mass index: 24.41      Inactivity Interventions:  Patient declined any further interventions or treatment        Vision Screen:  Do you have difficulty driving, watching TV, or doing any of your daily activities because of your eyesight?: No  Have you had an eye exam within the past year?: (!) No  No results found. Interventions:   Patient declines any further evaluation or treatment    Safety:  Do you have either shower bars, grab bars, non-slip mats or non-slip surfaces in your shower or bathtub?: (!) No    Interventions:  Patient declined any further interventions or treatment                     Objective   Vitals:    03/06/23 0810   BP: 138/82   Pulse: (!) 101   Resp: 18   Temp: 97.3 °F (36.3 °C)   SpO2: 99%   Weight: 180 lb (81.6 kg)   Height: 6' (1.829 m)      Body mass index is 24.41 kg/m². Physical Exam  Vitals reviewed. Constitutional:       General: He is not in acute distress. Appearance: He is well-developed. He is not ill-appearing or toxic-appearing. Cardiovascular:      Rate and Rhythm: Normal rate and regular rhythm. Heart sounds: No murmur heard.   Pulmonary:      Effort: Pulmonary effort is normal. No respiratory distress. Breath sounds: Normal breath sounds. No wheezing. Musculoskeletal:      Right lower leg: No edema. Left lower leg: No edema. Neurological:      Mental Status: He is alert. Mental status is at baseline. Psychiatric:         Mood and Affect: Mood normal.         Behavior: Behavior normal.           Allergies   Allergen Reactions    Asa [Aspirin]      bleeding     Prior to Visit Medications    Medication Sig Taking?  Authorizing Provider   amLODIPine (NORVASC) 10 MG tablet TAKE 1 TABLET EVERY DAY Yes Marnie Barrientos MD   simvastatin (ZOCOR) 5 MG tablet TAKE 1 TABLET EVERY NIGHT Yes Marnie Barrientos MD   lisinopril (PRINIVIL;ZESTRIL) 10 MG tablet TAKE 1 TABLET EVERY DAY Yes Marnie Barrientos MD   Handicap Placard MISC by Does not apply route Dx:  S/p hip replacement; duration 5 years Yes Marnie Barrientos MD   diclofenac sodium (VOLTAREN) 1 % GEL Apply topically 2 times daily Yes Historical Provider, MD Bhakta (Including outside providers/suppliers regularly involved in providing care):   Patient Care Team:  Marnie Barrientos MD as PCP - General (Family Medicine)  Marnie Barrientos MD as PCP - Empaneled Provider     Reviewed and updated this visit:  Tobacco  Allergies  Meds  Problems  Med Hx  Surg Hx  Soc Hx  Fam Hx               Marnie Barrientos MD

## 2023-03-06 NOTE — PATIENT INSTRUCTIONS
Preventing Falls: Care Instructions  Overview     Getting around your home safely can be a challenge if you have injuries or health problems that make it easy for you to fall. Loose rugs and furniture in walkways are among the dangers for many older people who have problems walking or who have poor eyesight. People who have conditions such as arthritis, osteoporosis, or dementia also have to be careful not to fall. You can make your home safer with a few simple measures. Follow-up care is a key part of your treatment and safety. Be sure to make and go to all appointments, and call your doctor if you are having problems. It's also a good idea to know your test results and keep a list of the medicines you take. How can you care for yourself at home? Taking care of yourself  Exercise regularly to improve your strength, muscle tone, and balance. Walk if you can. Swimming may be a good choice if you cannot walk easily. Have your vision and hearing checked each year or any time you notice a change. If you have trouble seeing and hearing, you might not be able to avoid objects and could lose your balance. Know the side effects of the medicines you take. Ask your doctor or pharmacist whether the medicines you take can affect your balance. Sleeping pills or sedatives can affect your balance. Limit the amount of alcohol you drink. Alcohol can impair your balance and other senses. Ask your doctor whether calluses or corns on your feet need to be removed. If you wear loose-fitting shoes because of calluses or corns, you can lose your balance and fall. Talk to your doctor if you have numbness in your feet. You may get dizzy if you do not drink enough water. To prevent dehydration, drink plenty of fluids. Choose water and other clear liquids. If you have kidney, heart, or liver disease and have to limit fluids, talk with your doctor before you increase the amount of fluids you drink.   Preventing falls at home  Remove raised doorway thresholds, throw rugs, and clutter. Repair loose carpet or raised areas in the floor. Move furniture and electrical cords to keep them out of walking paths. Use nonskid floor wax, and wipe up spills right away, especially on ceramic tile floors. If you use a walker or cane, put rubber tips on it. If you use crutches, clean the bottoms of them regularly with an abrasive pad, such as steel wool. Keep your house well lit, especially stairways, porches, and outside walkways. Use night-lights in areas such as hallways and bathrooms. Add extra light switches or use remote switches (such as switches that go on or off when you clap your hands) to make it easier to turn lights on if you have to get up during the night. Install sturdy handrails on stairways. Move items in your cabinets so that the things you use a lot are on the lower shelves (about waist level). Keep a cordless phone and a flashlight with new batteries by your bed. If possible, put a phone in each of the main rooms of your house, or carry a cell phone in case you fall and cannot reach a phone. Or, you can wear a device around your neck or wrist. You push a button that sends a signal for help. Wear low-heeled shoes that fit well and give your feet good support. Use footwear with nonskid soles. Check the heels and soles of your shoes for wear. Repair or replace worn heels or soles. Do not wear socks without shoes on smooth floors, such as wood. Walk on the grass when the sidewalks are slippery. If you live in an area that gets snow and ice in the winter, sprinkle salt on slippery steps and sidewalks. Or ask a family member or friend to do this for you. Preventing falls in the bath  Install grab bars and nonskid mats inside and outside your shower or tub and near the toilet and sinks. Use shower chairs and bath benches. Use a hand-held shower head that will allow you to sit while showering.   Get into a tub or shower by putting the weaker leg in first. Get out of a tub or shower with your strong side first.  Repair loose toilet seats and consider installing a raised toilet seat to make getting on and off the toilet easier. Keep your bathroom door unlocked while you are in the shower. Where can you learn more? Go to http://www.broussard.com/ and enter G117 to learn more about \"Preventing Falls: Care Instructions. \"  Current as of: May 4, 2022               Content Version: 13.5  © 6137-4899 Healthwise, [employer]. Care instructions adapted under license by [facility] ([facility]). If you have questions about a medical condition or this instruction, always ask your healthcare professional. [address] any warranty or liability for your use of this information. Learning About Being Active as an Older Adult  Why is being active important as you get older? Being active is one of the best things you can do for your health. And it's never too late to start. Being active--or getting active, if you aren't already--has definite benefits. It can:  Give you more energy,  Keep your mind sharp. Improve balance to reduce your risk of falls. Help you manage chronic illness with fewer medicines. No matter how old you are, how fit you are, or what health problems you have, there is a form of activity that will work for you. And the more physical activity you can do, the better your overall health will be. What kinds of activity can help you stay healthy? Being more active will make your daily activities easier. Physical activity includes planned exercise and things you do in daily life. There are four types of activity:  Aerobic. Doing aerobic activity makes your heart and lungs strong. Includes walking, dancing, and gardening. Aim for at least 2½ hours spread throughout the week. It improves your energy and can help you sleep better. Muscle-strengthening.   This type of activity can help maintain muscle and strengthen bones. Includes climbing stairs, using resistance bands, and lifting or carrying heavy loads. Aim for at least twice a week. It can help protect the knees and other joints. Stretching. Stretching gives you better range of motion in joints and muscles. Includes upper arm stretches, calf stretches, and gentle yoga. Aim for at least twice a week, preferably after your muscles are warmed up from other activities. It can help you function better in daily life. Balancing. This helps you stay coordinated and have good posture. Includes heel-to-toe walking, daniel chi, and certain types of yoga. Aim for at least 3 days a week. It can reduce your risk of falling. Even if you have a hard time meeting the recommendations, it's better to be more active than less active. All activity done in each category counts toward your weekly total. You'd be surprised how daily things like carrying groceries, keeping up with grandchildren, and taking the stairs can add up. What keeps you from being active? If you've had a hard time being more active, you're not alone. Maybe you remember being able to do more. Or maybe you've never thought of yourself as being active. It's frustrating when you can't do the things you want. Being more active can help. What's holding you back? Getting started. Have a goal, but break it into easy tasks. Small steps build into big accomplishments. Staying motivated. If you feel like skipping your activity, remember your goal. Maybe you want to move better and stay independent. Every activity gets you one step closer. Not feeling your best.  Start with 5 minutes of an activity you enjoy. Prove to yourself you can do it. As you get comfortable, increase your time. You may not be where you want to be. But you're in the process of getting there. Everyone starts somewhere. How can you find safe ways to stay active?   Talk with your doctor about any physical challenges you're facing. Make a plan with your doctor if you have a health problem or aren't sure how to get started with activity. If you're already active, ask your doctor if there is anything you should change to stay safe as your body and health change. If you tend to feel dizzy after you take medicine, avoid activity at that time. Try being active before you take your medicine. This will reduce your risk of falls. If you plan to be active at home, make sure to clear your space before you get started. Remove things like TV cords, coffee tables, and throw rugs. It's safest to have plenty of space to move freely. The key to getting more active is to take it slow and steady. Try to improve only a little bit at a time. Pick just one area to improve on at first. And if an activity hurts, stop and talk to your doctor. Where can you learn more? Go to http://[employer].broussard.com/ and enter P600 to learn more about \"Learning About Being Active as an Older Adult. \"  Current as of: October 10, 2022               Content Version: 13.5  © 6539-0758 [employer]. Care instructions adapted under license by [facility] If you have questions about a medical condition or this instruction, always ask your healthcare professional. [address] any warranty or liability for your use of this information. A Healthy Heart: Care Instructions  Your Care Instructions     Coronary artery disease, also called heart disease, occurs when a substance called plaque builds up in the vessels that supply oxygen-rich blood to your heart muscle. This can narrow the blood vessels and reduce blood flow. A heart attack happens when blood flow is completely blocked. A high-fat diet, smoking, and other factors increase the risk of heart disease. Your doctor has found that you have a chance of having heart disease. You can do lots of things to keep your heart healthy.  It may not be easy, but you can change your diet, exercise more, and quit smoking. These steps really work to lower your chance of heart disease. Follow-up care is a key part of your treatment and safety. Be sure to make and go to all appointments, and call your doctor if you are having problems. It's also a good idea to know your test results and keep a list of the medicines you take. How can you care for yourself at home? Diet    Use less salt when you cook and eat. This helps lower your blood pressure. Taste food before salting. Add only a little salt when you think you need it. With time, your taste buds will adjust to less salt.     Eat fewer snack items, fast foods, canned soups, and other high-salt, high-fat, processed foods.     Read food labels and try to avoid saturated and trans fats. They increase your risk of heart disease by raising cholesterol levels.     Limit the amount of solid fat-butter, margarine, and shortening-you eat. Use olive, peanut, or canola oil when you cook. Bake, broil, and steam foods instead of frying them.     Eat a variety of fruit and vegetables every day. Dark green, deep orange, red, or yellow fruits and vegetables are especially good for you. Examples include spinach, carrots, peaches, and berries.     Foods high in fiber can reduce your cholesterol and provide important vitamins and minerals. High-fiber foods include whole-grain cereals and breads, oatmeal, beans, brown rice, citrus fruits, and apples.     Eat lean proteins. Heart-healthy proteins include seafood, lean meats and poultry, eggs, beans, peas, nuts, seeds, and soy products.     Limit drinks and foods with added sugar. These include candy, desserts, and soda pop. Lifestyle changes    If your doctor recommends it, get more exercise. Walking is a good choice. Bit by bit, increase the amount you walk every day. Try for at least 30 minutes on most days of the week. You also may want to swim, bike, or do other activities.     Do not smoke.  If you need help quitting, talk to your doctor about stop-smoking programs and medicines. These can increase your chances of quitting for good. Quitting smoking may be the most important step you can take to protect your heart. It is never too late to quit.     Limit alcohol to 2 drinks a day for men and 1 drink a day for women. Too much alcohol can cause health problems.     Manage other health problems such as diabetes, high blood pressure, and high cholesterol. If you think you may have a problem with alcohol or drug use, talk to your doctor. Medicines    Take your medicines exactly as prescribed. Call your doctor if you think you are having a problem with your medicine.     If your doctor recommends aspirin, take the amount directed each day. Make sure you take aspirin and not another kind of pain reliever, such as acetaminophen (Tylenol). When should you call for help? Call 911 if you have symptoms of a heart attack. These may include:    Chest pain or pressure, or a strange feeling in the chest.     Sweating.     Shortness of breath.     Pain, pressure, or a strange feeling in the back, neck, jaw, or upper belly or in one or both shoulders or arms.     Lightheadedness or sudden weakness.     A fast or irregular heartbeat. After you call 911, the  may tell you to chew 1 adult-strength or 2 to 4 low-dose aspirin. Wait for an ambulance. Do not try to drive yourself. Watch closely for changes in your health, and be sure to contact your doctor if you have any problems. Where can you learn more? Go to http://www.broussard.com/ and enter F075 to learn more about \"A Healthy Heart: Care Instructions. \"  Current as of: September 7, 2022               Content Version: 13.5  © 1403-0101 Healthwise, Incorporated. Care instructions adapted under license by [facility] ([facility]).  If you have questions about a medical condition or this instruction, always ask your healthcare professional. Abundio Uriostegui disclaims any warranty or liability for your use of this information. Personalized Preventive Plan for Lupe Hanks - 3/6/2023  Medicare offers a range of preventive health benefits. Some of the tests and screenings are paid in full while other may be subject to a deductible, co-insurance, and/or copay. Some of these benefits include a comprehensive review of your medical history including lifestyle, illnesses that may run in your family, and various assessments and screenings as appropriate. After reviewing your medical record and screening and assessments performed today your provider may have ordered immunizations, labs, imaging, and/or referrals for you. A list of these orders (if applicable) as well as your Preventive Care list are included within your After Visit Summary for your review. Other Preventive Recommendations:    A preventive eye exam performed by an eye specialist is recommended every 1-2 years to screen for glaucoma; cataracts, macular degeneration, and other eye disorders. A preventive dental visit is recommended every 6 months. Try to get at least 150 minutes of exercise per week or 10,000 steps per day on a pedometer . Order or download the FREE \"Exercise & Physical Activity: Your Everyday Guide\" from [employer]. Call 1-157.999.2125 or search [employer] online. You need 8143-8270 mg of calcium and 2535-3468 IU of vitamin D per day. It is possible to meet your calcium requirement with diet alone, but a vitamin D supplement is usually necessary to meet this goal.  When exposed to the sun, use a sunscreen that protects against both UVA and UVB radiation with an SPF of 30 or greater. Reapply every 2 to 3 hours or after sweating, drying off with a towel, or swimming. Always wear a seat belt when traveling in a car. Always wear a helmet when riding a bicycle or motorcycle.

## 2023-03-14 ENCOUNTER — HOSPITAL ENCOUNTER (OUTPATIENT)
Age: 88
Discharge: HOME | End: 2023-03-14
Payer: MEDICARE

## 2023-03-14 ENCOUNTER — HOSPITAL ENCOUNTER (OUTPATIENT)
Dept: GENERAL RADIOLOGY | Age: 88
Discharge: HOME | End: 2023-03-14
Payer: MEDICARE

## 2023-03-14 DIAGNOSIS — M25.562 LEFT KNEE PAIN, UNSPECIFIED CHRONICITY: ICD-10-CM

## 2023-03-14 PROCEDURE — 73564 X-RAY EXAM KNEE 4 OR MORE: CPT

## 2023-03-31 ENCOUNTER — OFFICE VISIT (OUTPATIENT)
Dept: FAMILY MEDICINE CLINIC | Age: 88
End: 2023-03-31
Payer: MEDICARE

## 2023-03-31 VITALS
SYSTOLIC BLOOD PRESSURE: 128 MMHG | HEART RATE: 85 BPM | DIASTOLIC BLOOD PRESSURE: 82 MMHG | BODY MASS INDEX: 24.41 KG/M2 | HEIGHT: 72 IN | TEMPERATURE: 96.8 F | RESPIRATION RATE: 16 BRPM | OXYGEN SATURATION: 99 %

## 2023-03-31 DIAGNOSIS — G89.29 CHRONIC PAIN OF LEFT KNEE: Primary | ICD-10-CM

## 2023-03-31 DIAGNOSIS — M25.562 CHRONIC PAIN OF LEFT KNEE: Primary | ICD-10-CM

## 2023-03-31 DIAGNOSIS — M17.12 ARTHRITIS OF LEFT KNEE: ICD-10-CM

## 2023-03-31 PROCEDURE — G8427 DOCREV CUR MEDS BY ELIG CLIN: HCPCS | Performed by: FAMILY MEDICINE

## 2023-03-31 PROCEDURE — 1036F TOBACCO NON-USER: CPT | Performed by: FAMILY MEDICINE

## 2023-03-31 PROCEDURE — G8484 FLU IMMUNIZE NO ADMIN: HCPCS | Performed by: FAMILY MEDICINE

## 2023-03-31 PROCEDURE — G8420 CALC BMI NORM PARAMETERS: HCPCS | Performed by: FAMILY MEDICINE

## 2023-03-31 PROCEDURE — 99213 OFFICE O/P EST LOW 20 MIN: CPT | Performed by: FAMILY MEDICINE

## 2023-03-31 PROCEDURE — 1123F ACP DISCUSS/DSCN MKR DOCD: CPT | Performed by: FAMILY MEDICINE

## 2023-03-31 RX ORDER — PREDNISONE 20 MG/1
20 TABLET ORAL 2 TIMES DAILY
Qty: 10 TABLET | Refills: 0 | Status: SHIPPED | OUTPATIENT
Start: 2023-03-31 | End: 2023-04-05

## 2023-03-31 NOTE — PROGRESS NOTES
[facility]  [address] [facility]  Dept: [phone]  Dept Fax: [phone]  Loc: [phone]  PROGRESS NOTE      Visit Date: 3/31/2023    Aspen Rubi is a 80 y.o. male who presents today for:  Chief Complaint   Patient presents with    Knee Pain     Left knee pain, started a couple months ago but pain is getting really bad. Subjective:  Knee Pain       Left knee pain. Seen 3/14 by Eddie Saez Prior and had steroid injection in knee. Knee felt better and he did exercise bike for the next 2 days and has had worse knee pain since. No new injuries. Pain is entire knee. Taking aleve, tylenol (1 pill per day) and fish oil. Not icing it. Able to ambulate with walker. Wife and son are present    Review of Systems   Constitutional:  Negative for chills and fever.    Patient Active Problem List   Diagnosis    Hyperlipidemia    Osteoarthritis    CRF (chronic renal failure)    Primary osteoarthritis of left hip    HTN (hypertension)    Cholecystitis    Stage 3 chronic kidney disease (HCC)    Elevated alkaline phosphatase level    Constipation    Enlarged prostate    Splenomegaly    Urinary retention    Mild aortic regurgitation    Mild mitral regurgitation    Mild tricuspid regurgitation    S/P laparoscopic cholecystectomy    S/P right inguinal hernia repair    Paget disease of bone     Past Medical History:   Diagnosis Date    Hyperlipidemia     Hypertension     Osteoarthritis     PAC (premature atrial contraction)     Paget disease of bone     S/P laparoscopic cholecystectomy 7/18/2018    S/P right inguinal hernia repair 7/18/2018      Past Surgical History:   Procedure Laterality Date    JOINT REPLACEMENT Bilateral 2013    HIP    [place] LAPAROSCOPY SURG CHOLECYSTECTOMY N/A 7/16/2018    CHOLECYSTECTOMY LAPAROSCOPIC performed by Jonathan Louis MD at [facility] Right 7/16/2018

## 2023-04-06 ENCOUNTER — TELEPHONE (OUTPATIENT)
Dept: FAMILY MEDICINE CLINIC | Age: 88
End: 2023-04-06

## 2023-04-06 NOTE — TELEPHONE ENCOUNTER
Patient was in for an appt. With you on 3/31/23 for left knee pain. Son is wanting to know if you could call him in something for pain or would you like to see him for OV? Please advise.

## 2023-04-06 NOTE — TELEPHONE ENCOUNTER
----- Message from Bernie Nichols sent at 4/6/2023  1:50 PM EDT -----  Subject: Message to Provider    QUESTIONS  Information for Provider? Spoke with patient's son Dov Hurley, he is needing   to know if Robert Maxwell would send in a prescription for pain medication   for his knee. Please return this call to address, thank you.   ---------------------------------------------------------------------------  --------------  Cierra Ornelas INFO  [phone]; OK to leave message on voicemail  ---------------------------------------------------------------------------  --------------  SCRIPT ANSWERS  Relationship to Patient? Other  Representative Name? CORTEZ/ BRITNEY  Is the representative on the Communication Release of Information (JUANCARLOS)   form in Epic?  Yes

## 2023-04-07 ENCOUNTER — HOSPITAL ENCOUNTER (OUTPATIENT)
Dept: GENERAL RADIOLOGY | Age: 88
Discharge: HOME | End: 2023-04-07
Payer: MEDICARE

## 2023-04-07 ENCOUNTER — TELEPHONE (OUTPATIENT)
Dept: FAMILY MEDICINE CLINIC | Age: 88
End: 2023-04-07

## 2023-04-07 ENCOUNTER — OFFICE VISIT (OUTPATIENT)
Dept: FAMILY MEDICINE CLINIC | Age: 88
End: 2023-04-07
Payer: MEDICARE

## 2023-04-07 ENCOUNTER — HOSPITAL ENCOUNTER (OUTPATIENT)
Age: 88
Discharge: HOME | End: 2023-04-07
Payer: MEDICARE

## 2023-04-07 ENCOUNTER — HOSPITAL ENCOUNTER (OUTPATIENT)
Dept: ULTRASOUND IMAGING | Age: 88
Discharge: HOME | End: 2023-04-07
Payer: MEDICARE

## 2023-04-07 VITALS
TEMPERATURE: 97.8 F | SYSTOLIC BLOOD PRESSURE: 110 MMHG | DIASTOLIC BLOOD PRESSURE: 70 MMHG | OXYGEN SATURATION: 99 % | RESPIRATION RATE: 16 BRPM | HEART RATE: 100 BPM

## 2023-04-07 DIAGNOSIS — M25.562 CHRONIC PAIN OF LEFT KNEE: ICD-10-CM

## 2023-04-07 DIAGNOSIS — G89.29 CHRONIC PAIN OF LEFT KNEE: ICD-10-CM

## 2023-04-07 DIAGNOSIS — R10.32 LEFT LOWER QUADRANT ABDOMINAL PAIN: ICD-10-CM

## 2023-04-07 DIAGNOSIS — R10.32 LEFT LOWER QUADRANT ABDOMINAL PAIN: Primary | ICD-10-CM

## 2023-04-07 PROCEDURE — 73502 X-RAY EXAM HIP UNI 2-3 VIEWS: CPT

## 2023-04-07 PROCEDURE — 99214 OFFICE O/P EST MOD 30 MIN: CPT | Performed by: NURSE PRACTITIONER

## 2023-04-07 PROCEDURE — 76705 ECHO EXAM OF ABDOMEN: CPT

## 2023-04-07 PROCEDURE — 1123F ACP DISCUSS/DSCN MKR DOCD: CPT | Performed by: NURSE PRACTITIONER

## 2023-04-07 RX ORDER — HYDROCODONE BITARTRATE AND ACETAMINOPHEN 5; 325 MG/1; MG/1
1 TABLET ORAL EVERY 6 HOURS PRN
Qty: 120 TABLET | Refills: 0 | Status: SHIPPED | OUTPATIENT
Start: 2023-04-07 | End: 2023-05-07

## 2023-04-07 ASSESSMENT — ENCOUNTER SYMPTOMS
CHEST TIGHTNESS: 0
NAUSEA: 0
ABDOMINAL PAIN: 1
SHORTNESS OF BREATH: 0
VOMITING: 0

## 2023-04-07 NOTE — PROGRESS NOTES
[facility] MEDICINE  [address]  [address] [address]  Dept: [phone]  Loc:  Ruth Ogdne (:  1933) is a 80 y.o. male, here for evaluation of the following chief complaint(s):  Abdominal Pain (Left lower quadrant pain especially with pressure like sneezing or coughing. )      ASSESSMENT/PLAN:  1. Left lower quadrant abdominal pain  -     XR HIP LEFT (2-3 VIEWS); Future  -     US ABDOMEN COMPLETE; Future  2. Chronic pain of left knee  -     HYDROcodone-acetaminophen (NORCO) 5-325 MG per tablet; Take 1 tablet by mouth every 6 hours as needed for Pain for up to 30 days. Max Daily Amount: 4 tablets, Disp-120 tablet, R-0Normal    If symptoms do not improve or significantly worsen please let me know. Concern for possible hip fracture with fall. Will obtain xray of hip. Will obtain ultrasound for possible hematoma or hernia. OARRS reviewed. Will start Tiarno Di Sopra for chronic persistent knee pain. Stop Aleve and Advil. May continue to use topical. If you have any issues with side effects please call. Update us via message in 1-2 weeks to let us know how it is going. Discussed risks with opioid prescription and patient and his family voiced there understanding. Return in about 30 years (around 2053) for medication change. SUBJECTIVE/OBJECTIVE:  Patient is here with his son and wife for chronic left knee pain. Patient was seen 23 by Dr Dayanna Sullivan and was started on prednisone. He has not seen any relief with the prednisone. He has been taking Aleve and Advil daily. Has also tried topical. These also have given no relief. He did have steroid injection with ortho previously but this did not give any sustained relief. Patient rates the pain as 10/10. States pain radiates to the entire leg. States pain is constant, aching. Pain will wake him up from bed at night. No fever or chills. No redness of the joint.   Patient also

## 2023-04-07 NOTE — TELEPHONE ENCOUNTER
Called and spoke with patient's son Crow Downs. I have reviewed results with him and he has verbalized an understanding.

## 2023-08-09 ENCOUNTER — TELEPHONE (OUTPATIENT)
Dept: FAMILY MEDICINE CLINIC | Age: 88
End: 2023-08-09

## 2023-08-09 ENCOUNTER — HOSPITAL ENCOUNTER (EMERGENCY)
Age: 88
Discharge: HOME | End: 2023-08-09
Attending: EMERGENCY MEDICINE
Payer: MEDICARE

## 2023-08-09 ENCOUNTER — APPOINTMENT (OUTPATIENT)
Dept: CT IMAGING | Age: 88
End: 2023-08-09
Payer: MEDICARE

## 2023-08-09 VITALS
TEMPERATURE: 98.2 F | DIASTOLIC BLOOD PRESSURE: 80 MMHG | WEIGHT: 180 LBS | BODY MASS INDEX: 24.38 KG/M2 | SYSTOLIC BLOOD PRESSURE: 149 MMHG | OXYGEN SATURATION: 97 % | HEART RATE: 90 BPM | RESPIRATION RATE: 17 BRPM | HEIGHT: 72 IN

## 2023-08-09 DIAGNOSIS — R33.9 URINARY RETENTION: Primary | ICD-10-CM

## 2023-08-09 LAB
ANION GAP SERPL CALC-SCNC: 12 MEQ/L (ref 8–16)
BASOPHILS ABSOLUTE: 0 THOU/MM3 (ref 0–0.1)
BASOPHILS NFR BLD AUTO: 0.4 %
BUN SERPL-MCNC: 29 MG/DL (ref 7–22)
CALCIUM SERPL-MCNC: 8.9 MG/DL (ref 8.5–10.5)
CHLORIDE SERPL-SCNC: 103 MEQ/L (ref 98–111)
CK SERPL-CCNC: 170 U/L (ref 55–170)
CO2 SERPL-SCNC: 22 MEQ/L (ref 23–33)
CREAT SERPL-MCNC: 1.8 MG/DL (ref 0.4–1.2)
DEPRECATED RDW RBC AUTO: 44.2 FL (ref 35–45)
EOSINOPHIL NFR BLD AUTO: 0.4 %
EOSINOPHILS ABSOLUTE: 0 THOU/MM3 (ref 0–0.4)
ERYTHROCYTE [DISTWIDTH] IN BLOOD BY AUTOMATED COUNT: 13 % (ref 11.5–14.5)
GFR SERPL CREATININE-BSD FRML MDRD: 35 ML/MIN/1.73M2
GLUCOSE SERPL-MCNC: 119 MG/DL (ref 70–108)
HCT VFR BLD AUTO: 36.4 % (ref 42–52)
HGB BLD-MCNC: 11.8 GM/DL (ref 14–18)
IMM GRANULOCYTES # BLD AUTO: 0.01 THOU/MM3 (ref 0–0.07)
IMM GRANULOCYTES NFR BLD AUTO: 0.2 %
LYMPHOCYTES ABSOLUTE: 0.6 THOU/MM3 (ref 1–4.8)
LYMPHOCYTES NFR BLD AUTO: 12.2 %
MCH RBC QN AUTO: 29.9 PG (ref 26–33)
MCHC RBC AUTO-ENTMCNC: 32.4 GM/DL (ref 32.2–35.5)
MCV RBC AUTO: 92.4 FL (ref 80–94)
MONOCYTES ABSOLUTE: 0.4 THOU/MM3 (ref 0.4–1.3)
MONOCYTES NFR BLD AUTO: 8.3 %
NEUTROPHILS NFR BLD AUTO: 78.5 %
NRBC BLD AUTO-RTO: 0 /100 WBC
OSMOLALITY SERPL CALC.SUM OF ELEC: 280.8 MOSMOL/KG (ref 275–300)
PLATELET # BLD AUTO: 133 THOU/MM3 (ref 130–400)
PMV BLD AUTO: 9 FL (ref 9.4–12.4)
POTASSIUM SERPL-SCNC: 4 MEQ/L (ref 3.5–5.2)
RBC # BLD AUTO: 3.94 MILL/MM3 (ref 4.7–6.1)
SEGMENTED NEUTROPHILS ABSOLUTE COUNT: 3.7 THOU/MM3 (ref 1.8–7.7)
SODIUM SERPL-SCNC: 137 MEQ/L (ref 135–145)
TROPONIN, HIGH SENSITIVITY: 36 NG/L (ref 0–12)
WBC # BLD AUTO: 4.7 THOU/MM3 (ref 4.8–10.8)

## 2023-08-09 PROCEDURE — 51702 INSERT TEMP BLADDER CATH: CPT

## 2023-08-09 PROCEDURE — 93005 ELECTROCARDIOGRAM TRACING: CPT | Performed by: EMERGENCY MEDICINE

## 2023-08-09 PROCEDURE — 80048 BASIC METABOLIC PNL TOTAL CA: CPT

## 2023-08-09 PROCEDURE — 6370000000 HC RX 637 (ALT 250 FOR IP): Performed by: EMERGENCY MEDICINE

## 2023-08-09 PROCEDURE — 51798 US URINE CAPACITY MEASURE: CPT

## 2023-08-09 PROCEDURE — 99284 EMERGENCY DEPT VISIT MOD MDM: CPT

## 2023-08-09 PROCEDURE — 36415 COLL VENOUS BLD VENIPUNCTURE: CPT

## 2023-08-09 PROCEDURE — 85025 COMPLETE CBC W/AUTO DIFF WBC: CPT

## 2023-08-09 PROCEDURE — 70450 CT HEAD/BRAIN W/O DYE: CPT

## 2023-08-09 PROCEDURE — 84484 ASSAY OF TROPONIN QUANT: CPT

## 2023-08-09 PROCEDURE — 82550 ASSAY OF CK (CPK): CPT

## 2023-08-09 RX ORDER — LIDOCAINE HYDROCHLORIDE 20 MG/ML
JELLY TOPICAL PRN
Status: DISCONTINUED | OUTPATIENT
Start: 2023-08-09 | End: 2023-08-09 | Stop reason: HOSPADM

## 2023-08-09 RX ADMIN — LIDOCAINE HYDROCHLORIDE: 20 JELLY TOPICAL at 16:44

## 2023-08-09 ASSESSMENT — PAIN - FUNCTIONAL ASSESSMENT: PAIN_FUNCTIONAL_ASSESSMENT: NONE - DENIES PAIN

## 2023-08-09 NOTE — ED NOTES
Pt discharged in satisfactory condition to return home with his wife.      Greyson Quiroga RN  08/09/23 [id number]

## 2023-08-09 NOTE — TELEPHONE ENCOUNTER
[facility]- Nurse triage call, Patient's son Mary Roman called and reports patient has been having issues urinating. Mary Roman reports he has the urge and gets up to go and them unable to urinate x 2 days. Mary Roman reports patient has been incontinent of urine x 2. Patient reports he \"peed\" his pants t/o the night and that was the last time he urinated. Karla Addison to take patient to go to ER for eval and tx.

## 2023-08-09 NOTE — ED TRIAGE NOTES
Pt arrived to ED 21 with family from home c/o unable to pee for a couple weeks. Pt reports he just wets himself a little, but is unable to have an adequate void.

## 2023-08-10 PROCEDURE — 93010 ELECTROCARDIOGRAM REPORT: CPT | Performed by: INTERNAL MEDICINE

## 2023-08-11 LAB
EKG ATRIAL RATE: 93 BPM
EKG P AXIS: 77 DEGREES
EKG P-R INTERVAL: 176 MS
EKG Q-T INTERVAL: 356 MS
EKG QRS DURATION: 76 MS
EKG QTC CALCULATION (BAZETT): 442 MS
EKG R AXIS: -34 DEGREES
EKG T AXIS: 34 DEGREES
EKG VENTRICULAR RATE: 93 BPM

## 2023-08-14 ENCOUNTER — TELEPHONE (OUTPATIENT)
Dept: UROLOGY | Age: 88
End: 2023-08-14

## 2023-08-14 NOTE — TELEPHONE ENCOUNTER
Urology consulted for urinary retention. Patient had 18F coude catheter placed in ER by Dr. Tommy Montesinos on 8/10/23. He will need follow-up in 1-4 weeks for cystoscopy in office by Dr. Tommy Montesinos.

## 2023-08-15 ENCOUNTER — OFFICE VISIT (OUTPATIENT)
Dept: UROLOGY | Age: 88
End: 2023-08-15
Payer: MEDICARE

## 2023-08-15 ENCOUNTER — TELEPHONE (OUTPATIENT)
Dept: UROLOGY | Age: 88
End: 2023-08-15

## 2023-08-15 ENCOUNTER — NURSE ONLY (OUTPATIENT)
Dept: LAB | Age: 88
End: 2023-08-15

## 2023-08-15 VITALS — WEIGHT: 180 LBS | RESPIRATION RATE: 16 BRPM | BODY MASS INDEX: 24.38 KG/M2 | HEIGHT: 72 IN | TEMPERATURE: 96.8 F

## 2023-08-15 DIAGNOSIS — R97.20 ELEVATED PSA: ICD-10-CM

## 2023-08-15 DIAGNOSIS — R97.20 BPH WITH ELEVATED PSA: ICD-10-CM

## 2023-08-15 DIAGNOSIS — N40.0 BPH WITH ELEVATED PSA: ICD-10-CM

## 2023-08-15 DIAGNOSIS — R33.9 URINARY RETENTION: Primary | ICD-10-CM

## 2023-08-15 LAB — PSA SERPL-MCNC: 27.36 NG/ML (ref 0–1)

## 2023-08-15 PROCEDURE — 1036F TOBACCO NON-USER: CPT

## 2023-08-15 PROCEDURE — G8420 CALC BMI NORM PARAMETERS: HCPCS

## 2023-08-15 PROCEDURE — 1123F ACP DISCUSS/DSCN MKR DOCD: CPT

## 2023-08-15 PROCEDURE — G8427 DOCREV CUR MEDS BY ELIG CLIN: HCPCS

## 2023-08-15 PROCEDURE — 99204 OFFICE O/P NEW MOD 45 MIN: CPT

## 2023-08-15 NOTE — TELEPHONE ENCOUNTER
Per Lilian's check out note     Call with decision   Follow-up within 4 weeks for a catheter change  Call with questions, comments, or concerns. I recommend going to the ED for further evaluation if you develop fever, chills, nausea, vomiting, chest pain, SOB, or calf pain.      Pt refused follow up appointment will call if he decides to proceed

## 2023-08-15 NOTE — PATIENT INSTRUCTIONS
Call with decision   Follow-up within 4 weeks for a catheter change  Call with questions, comments, or concerns. I recommend going to the ED for further evaluation if you develop fever, chills, nausea, vomiting, chest pain, SOB, or calf pain.

## 2023-08-15 NOTE — PROGRESS NOTES
ANTIONETTE Ochoa is a 80 y.o. male that presents to the urology clinic for an ED follow-up         8/15/23  Mr. Melchor Coburn is an 68-year-old male that presents after having been evaluated in the ED for dysuria. He notes not being able to empty his bladder over the duration of the past 2 weeks. An 18F coude catheter was placed while the patient was in the ED by Dr. Shanon Grider on 8/10/2023. He has seen Ruiz Schuster in the past for elevated PSA, BPH, urinary retention, as well as difficult placement of a jane catheter. He has taken flomax for BPH symptoms and for management of urinary retention. However, he discontinued its use. His last PSA obtained was in 11/2018 and was elevated at 19.49. Per Ruiz Schuster CNP note, the patient was made aware that a PSA level is obtained for prostate cancer screening and cannot diagnose prostate cancer alone. He was offered a prostate biopsy at the time, and did not wish to pursue it then or in the future.        Most recent PSA:   Lab Results   Component Value Date    PSA 19.49 (H) 11/01/2018       Last BUN and creatinine:  Lab Results   Component Value Date    BUN 29 (H) 08/09/2023     Lab Results   Component Value Date    CREATININE 1.8 (H) 08/09/2023           PAST MEDICAL, FAMILY AND SOCIAL HISTORY UPDATE:  Past Medical History:   Diagnosis Date    Hyperlipidemia     Hypertension     Osteoarthritis     PAC (premature atrial contraction)     Paget disease of bone     S/P laparoscopic cholecystectomy 7/18/2018    S/P right inguinal hernia repair 7/18/2018     Past Surgical History:   Procedure Laterality Date    JOINT REPLACEMENT Bilateral 2013    HIP    IL LAPAROSCOPY SURG CHOLECYSTECTOMY N/A 7/16/2018    CHOLECYSTECTOMY LAPAROSCOPIC performed by Benetta Goldmann, MD at [address] [place] [address] 7/16/2018    Right HERNIA INGUINAL REPAIR performed by Benetta Goldmann, MD at [address] [place] [address]     Family History   Problem Relation Age of Onset

## 2023-08-22 ENCOUNTER — TELEPHONE (OUTPATIENT)
Dept: UROLOGY | Age: 88
End: 2023-08-22

## 2023-08-22 NOTE — TELEPHONE ENCOUNTER
Please call patient to determine if he would like to schedule cystoscopy and discuss PSA results. His PSA was elevated. Ideally it will occur within 4 weeks of his last OV, as he will be due for a cath change. He is a difficult catheter placement. Dr. Darrin Musa previously placed his catheter. Regardless of his choice to pursue a cysto,  our office would like to see the patient within 4 weeks of his last visit to address his jane catheter.      The patient should go to the ED should they develop fever, chills, nausea, vomiting, chest pain, SOB, calf pain, feelings of incomplete emptying, or should they otherwise feel they need evaluated

## 2023-08-23 NOTE — TELEPHONE ENCOUNTER
See other encounter from 08/22/2023. Patient spoke to pre-service and would like to schedule the scope. Message sent to  to schedule. Please schedule.

## 2023-08-24 ENCOUNTER — TELEPHONE (OUTPATIENT)
Dept: UROLOGY | Age: 88
End: 2023-08-24

## 2023-09-06 ENCOUNTER — OFFICE VISIT (OUTPATIENT)
Dept: FAMILY MEDICINE CLINIC | Age: 88
End: 2023-09-06
Payer: MEDICARE

## 2023-09-06 VITALS
RESPIRATION RATE: 18 BRPM | DIASTOLIC BLOOD PRESSURE: 70 MMHG | BODY MASS INDEX: 23.81 KG/M2 | WEIGHT: 175.8 LBS | HEIGHT: 72 IN | TEMPERATURE: 97.9 F | SYSTOLIC BLOOD PRESSURE: 138 MMHG | OXYGEN SATURATION: 98 % | HEART RATE: 102 BPM

## 2023-09-06 DIAGNOSIS — L89.312 PRESSURE INJURY OF RIGHT BUTTOCK, STAGE 2 (HCC): ICD-10-CM

## 2023-09-06 DIAGNOSIS — E78.5 HYPERLIPIDEMIA, UNSPECIFIED HYPERLIPIDEMIA TYPE: ICD-10-CM

## 2023-09-06 DIAGNOSIS — N18.32 STAGE 3B CHRONIC KIDNEY DISEASE (HCC): ICD-10-CM

## 2023-09-06 DIAGNOSIS — I10 ESSENTIAL HYPERTENSION: Primary | ICD-10-CM

## 2023-09-06 PROCEDURE — 1036F TOBACCO NON-USER: CPT | Performed by: FAMILY MEDICINE

## 2023-09-06 PROCEDURE — 1123F ACP DISCUSS/DSCN MKR DOCD: CPT | Performed by: FAMILY MEDICINE

## 2023-09-06 PROCEDURE — G8427 DOCREV CUR MEDS BY ELIG CLIN: HCPCS | Performed by: FAMILY MEDICINE

## 2023-09-06 PROCEDURE — G8420 CALC BMI NORM PARAMETERS: HCPCS | Performed by: FAMILY MEDICINE

## 2023-09-06 PROCEDURE — 99214 OFFICE O/P EST MOD 30 MIN: CPT | Performed by: FAMILY MEDICINE

## 2023-09-06 ASSESSMENT — ENCOUNTER SYMPTOMS
WHEEZING: 0
SHORTNESS OF BREATH: 0

## 2023-09-06 NOTE — PROGRESS NOTES
questions answered. They agreed with treatment plan. See patient instructions for any educational materials that may have been given. Discussed use, benefit, and side effects of prescribed medications. Reviewed health maintenance. (Please note that portions of this note may have been completed with a voice recognition program.  Efforts were made to edit the dictation but occasionally words are mis-transcribed.)    Return in about 6 months (around 3/6/2024) for medicare wellness.       Electronically signed by Michael Ivory MD on 9/6/2023 at 8:41 AM

## 2023-09-11 ENCOUNTER — HOSPITAL ENCOUNTER (OUTPATIENT)
Dept: UROLOGY | Age: 88
Discharge: HOME | End: 2023-09-11
Payer: MEDICARE

## 2023-09-11 VITALS
SYSTOLIC BLOOD PRESSURE: 108 MMHG | DIASTOLIC BLOOD PRESSURE: 63 MMHG | TEMPERATURE: 97.2 F | HEART RATE: 80 BPM | RESPIRATION RATE: 18 BRPM | HEIGHT: 72 IN | WEIGHT: 177.4 LBS | BODY MASS INDEX: 24.03 KG/M2

## 2023-09-11 DIAGNOSIS — R33.9 URINARY RETENTION: ICD-10-CM

## 2023-09-11 DIAGNOSIS — N13.8 BPH WITH OBSTRUCTION/LOWER URINARY TRACT SYMPTOMS: Primary | ICD-10-CM

## 2023-09-11 DIAGNOSIS — N40.1 BPH WITH OBSTRUCTION/LOWER URINARY TRACT SYMPTOMS: Primary | ICD-10-CM

## 2023-09-11 LAB
BILIRUBIN URINE: NEGATIVE
BLOOD URINE, POC: ABNORMAL
CHARACTER, URINE: ABNORMAL
COLOR, URINE: YELLOW
GLUCOSE URINE: NEGATIVE MG/DL
KETONES, URINE: ABNORMAL
LEUKOCYTE CLUMPS, URINE: ABNORMAL
NITRITE, URINE: POSITIVE
PH, URINE: 5.5 (ref 5–9)
PROTEIN, URINE: >= 300 MG/DL
SPECIFIC GRAVITY, URINE: 1.02 (ref 1–1.03)
UROBILINOGEN, URINE: 1 EU/DL (ref 0–1)

## 2023-09-11 PROCEDURE — 52000 CYSTOURETHROSCOPY: CPT

## 2023-09-11 RX ORDER — DOXYCYCLINE HYCLATE 100 MG
100 TABLET ORAL 2 TIMES DAILY
Qty: 6 TABLET | Refills: 0 | Status: SHIPPED | OUTPATIENT
Start: 2023-09-11 | End: 2023-09-14

## 2023-09-11 NOTE — PROGRESS NOTES
[facility]  This procedure has been fully reviewed with the patient and written informed consent has been obtained. 0930 18Fr coude catheter removed without difficulty. 10ml of sterile water was drained from balloon prior to removal.  Patient tolerated well. 1000 Procedure started with Dr. Kerri Marin  1001 Procedure completed; patient tolerated well. 18Fr coude catheter placed by Dr. Kerri Marin. Balloon inflated with 10ml normal saline. Catheter connected to leg bag per patient request.  Patient tolerated well. Dr. Kerri Marin talked to patient and family in length about procedure findings. Patient discharged. PLAN     Follow up with Dr. Kerri Marin in 3 months. Family requesting patient be set up with home health nurse. Order placed and home health notified. Catheter change every 4 weeks.

## 2023-09-11 NOTE — H&P
[facility]  Urology H&P Note     Patient:  Dayday Fallon  MRN: [id number]  YOB: 1933    ATTENDING: Evonne Joyner MD     CHIEF COMPLAINT:      HISTORY OF PRESENT ILLNESS:   The patient is a 80 y.o. male who presents with BPH with Urinary retention    Patient's old records, notes and chart reviewed and summarized above. Past Medical History:    Past Medical History:   Diagnosis Date    BPH with elevated PSA     Elevated PSA     Hyperlipidemia     Hypertension     Osteoarthritis     PAC (premature atrial contraction)     Paget disease of bone     S/P laparoscopic cholecystectomy 07/18/2018    S/P right inguinal hernia repair 07/18/2018    Urinary retention        Past Surgical History:    Past Surgical History:   Procedure Laterality Date    JOINT REPLACEMENT Bilateral 2013    HIP    OR LAPAROSCOPY SURG CHOLECYSTECTOMY N/A 7/16/2018    CHOLECYSTECTOMY LAPAROSCOPIC performed by Velma Mclean MD at [address] [place] [address] Right 7/16/2018    Right HERNIA INGUINAL REPAIR performed by Velma Mclean MD at [address] [place] [address]       Medications Prior to Admission:   Prior to Admission medications    Medication Sig Start Date End Date Taking?  Authorizing Provider   amLODIPine (NORVASC) 10 MG tablet Take 1 tablet by mouth daily 3/6/23   Dhiraj Coats MD   simvastatin (ZOCOR) 5 MG tablet Take 1 tablet by mouth nightly 3/6/23   Dhiraj Coats MD   lisinopril (PRINIVIL;ZESTRIL) 10 MG tablet Take 1 tablet by mouth daily 3/6/23   Dhiraj Coats MD   Handicap Placard MISC by Does not apply route Dx:  S/p hip replacement; duration 5 years 6/7/22   Dhiraj Coats MD   diclofenac sodium (VOLTAREN) 1 % GEL Apply topically as needed    Historical Provider, MD       Allergies:  Bernardo Economy [aspirin]    Social History:    Social History     Socioeconomic History    Marital status:      Spouse name: Elizabet Schooling    Number of children: 4    Years of education: 8    Highest

## 2023-09-11 NOTE — OP NOTE
Cystoscopy Operative Note  Surgeon: Emilie Figueredo MD   Anesthesia: Urethral 2%  Indications:  BPH with urinary retention  Position: supine  EBL: 1 cc  Specimen: none  Findings:   The patient was prepped and draped in the usual sterile fashion. The flexible cystoscope was advanced through the urethra and into the bladder. The bladder was thoroughly inspected and the following was noted:    Residual Urine: Moderate   Urethra: No abnormalities of the urethra are noted. Prostate: Very large gland (> 100 gm) Complete obstruction by lateral  & median lobe of prostate. Bladder: No tumors or CIS noted. No bladder diverticulum. Moderate  trabeculation noted. Ureters: Clear efflux from both ureters. Orifices with normal configuration and location. The cystoscope was removed. The patient tolerated the procedure well. Plan: Good candidate for Greenlight PVP vs dependent catheter  Poor surgical candidate.

## 2023-09-14 ENCOUNTER — TELEPHONE (OUTPATIENT)
Dept: UROLOGY | Age: 88
End: 2023-09-14

## 2023-09-14 NOTE — TELEPHONE ENCOUNTER
Hailey Kent from [facility] hh called    Unable to admit per hh d/t patient not being home bound. Will need to set up appt for cath change. Lvm with son to set up appt.

## 2023-10-11 ENCOUNTER — NURSE ONLY (OUTPATIENT)
Dept: UROLOGY | Age: 88
End: 2023-10-11
Payer: MEDICARE

## 2023-10-11 DIAGNOSIS — R33.9 URINARY RETENTION: Primary | ICD-10-CM

## 2023-10-11 PROCEDURE — 51702 INSERT TEMP BLADDER CATH: CPT | Performed by: NURSE PRACTITIONER

## 2023-10-11 NOTE — PROGRESS NOTES
Patient has given me verbal consent to perform catheter change Yes    Does patient have latex allergy? No  Does patient have shellfish or betadine allergy? No      Following Nani Calderón, APRN-CNP plan of care. 18Fr Catheter changed without difficulty. Once balloon was deflated, removed catheter without difficulty. Cleansed urethral opening with betadine swab. 18Fr Coude jane was inserted using sterile water-soluble lubricant without difficulty. Catheter was flushed with 35cc water ensuring return and inflated balloon with 10 ml of water. Jane Catheter was hooked up to leg bag. Patient instructed on how to drain catheter bags. If patient was given a leg bag, patient was instructed to keep bag above the knee to prevent pulling on catheter. Pt notified if catheter is pulled on may cause pain and bleeding. Patient was also instructed on how to switch from leg bag to overnight bag.           Supplies were provided by office

## 2023-11-08 ENCOUNTER — NURSE ONLY (OUTPATIENT)
Dept: UROLOGY | Age: 88
End: 2023-11-08
Payer: MEDICARE

## 2023-11-08 DIAGNOSIS — R33.9 URINARY RETENTION: Primary | ICD-10-CM

## 2023-11-08 PROCEDURE — 51702 INSERT TEMP BLADDER CATH: CPT | Performed by: NURSE PRACTITIONER

## 2023-11-08 NOTE — PROGRESS NOTES
Patient has given me verbal consent to perform catheter change Yes    Does patient have latex allergy? No  Does patient have shellfish or betadine allergy? No      Following SEAMUS Herrera-CNP plan of care. 18Fr Catheter changed without difficulty. Once balloon was deflated, removed catheter without difficulty. Cleansed urethral opening with betadine swab. 18Fr Coude jane was inserted using sterile water-soluble lubricant without difficulty. Catheter was flushed with 35cc water ensuring return and inflated balloon with 10 ml of water. Jane Catheter was hooked up to leg bag. Patient instructed on how to drain catheter bags. If patient was given a leg bag, patient was instructed to keep bag above the knee to prevent pulling on catheter. Pt notified if catheter is pulled on may cause pain and bleeding. Patient was also instructed on how to switch from leg bag to overnight bag.           Supplies were provided by office

## 2023-12-11 ENCOUNTER — OFFICE VISIT (OUTPATIENT)
Dept: UROLOGY | Age: 88
End: 2023-12-11
Payer: MEDICARE

## 2023-12-11 VITALS — BODY MASS INDEX: 23.98 KG/M2 | RESPIRATION RATE: 16 BRPM | WEIGHT: 177 LBS | HEIGHT: 72 IN

## 2023-12-11 DIAGNOSIS — N40.1 BPH WITH OBSTRUCTION/LOWER URINARY TRACT SYMPTOMS: Primary | ICD-10-CM

## 2023-12-11 DIAGNOSIS — N13.8 BPH WITH OBSTRUCTION/LOWER URINARY TRACT SYMPTOMS: Primary | ICD-10-CM

## 2023-12-11 DIAGNOSIS — R97.20 ELEVATED PSA: ICD-10-CM

## 2023-12-11 PROCEDURE — 51702 INSERT TEMP BLADDER CATH: CPT | Performed by: UROLOGY

## 2023-12-11 PROCEDURE — 99214 OFFICE O/P EST MOD 30 MIN: CPT | Performed by: UROLOGY

## 2023-12-11 PROCEDURE — G8427 DOCREV CUR MEDS BY ELIG CLIN: HCPCS | Performed by: UROLOGY

## 2023-12-11 PROCEDURE — G8484 FLU IMMUNIZE NO ADMIN: HCPCS | Performed by: UROLOGY

## 2023-12-11 PROCEDURE — 1123F ACP DISCUSS/DSCN MKR DOCD: CPT | Performed by: UROLOGY

## 2023-12-11 PROCEDURE — 1036F TOBACCO NON-USER: CPT | Performed by: UROLOGY

## 2023-12-11 PROCEDURE — G8420 CALC BMI NORM PARAMETERS: HCPCS | Performed by: UROLOGY

## 2023-12-11 NOTE — PROGRESS NOTES
Patient has given me verbal consent to perform catheter change Yes    Does patient have latex allergy? No  Does patient have shellfish or betadine allergy? No      Following Dr. Jaqui Schwab of [facility]. 18Fr Catheter changed without difficulty. Once balloon was deflated, removed catheter without difficulty. Cleansed urethral opening with betadine swab. 18Fr Coude jane was inserted using sterile water-soluble lubricant without difficulty. Catheter was flushed with 35cc water ensuring return and inflated balloon with 10 ml of water. Jane Catheter was hooked up to leg bag. Foreskin reduced back down? N/A    Patient instructed on how to drain catheter bags. If patient was given a leg bag, patient was instructed to keep bag above the knee to prevent pulling on catheter. Pt notified if catheter is pulled on may cause pain and bleeding. Patient was also instructed on how to switch from leg bag to overnight bag.           Supplies were provided by office

## 2023-12-11 NOTE — PROGRESS NOTES
Dr. Sia Renee MD MD  [facility] Consultation / New Patient Visit    Patient:  Radha Last  YOB: 1933  Date: 12/11/2023  Consult requested from Epi Hannah MD     HISTORY OF PRESENT ILLNESS:   The patient is a 80 y.o. male who presents today for follow-up for the following problem(s): BPH  Overall the problem(s) : are worsening. Associated Symptoms: No dysuria, gross hematuria. Pain Severity:      Today visit:   12/11/23  Mr. Petra Valle is an 51-year-old male that presents with urinary retention secondary to BPH, offered TURP Greenlight, however the patient elects for catheter drainage. Offered SPT     His last PSA obtained was in 11/2018 and was elevated at 19.49. Per Laurie Martel CNP note, the patient was made aware that a PSA level is obtained for prostate cancer screening and cannot diagnose prostate cancer alone. He was offered a prostate biopsy at the time, and did not wish to pursue it then or in the [facility] of old records:   (Patient's old records, notes and chart reviewed and summarized above.)    Last several PSA's:  Lab Results   Component Value Date    PSA 27.36 (H) 08/15/2023    PSA 19.49 (H) 11/01/2018       Last total testosterone:  No results found for: \"TESTOSTERONE\"    Urinalysis today:  No results found for this visit on 12/11/23.       Last BUN and creatinine:  Lab Results   Component Value Date    BUN 29 (H) 08/09/2023     Lab Results   Component Value Date    CREATININE 1.8 (H) 08/09/2023       Imaging Reviewed during this Office Visit:   (results were independently reviewed by physician and radiology report verified)    PAST MEDICAL, FAMILY AND SOCIAL HISTORY:  Past Medical History:   Diagnosis Date    BPH with elevated PSA     Elevated PSA     Hyperlipidemia     Hypertension     Osteoarthritis     PAC (premature atrial contraction)     Paget disease of bone     S/P laparoscopic cholecystectomy 07/18/2018    S/P right inguinal hernia

## 2024-01-22 ENCOUNTER — NURSE ONLY (OUTPATIENT)
Dept: UROLOGY | Age: 89
End: 2024-01-22
Payer: MEDICARE

## 2024-01-22 DIAGNOSIS — R33.9 URINARY RETENTION: Primary | ICD-10-CM

## 2024-01-22 PROCEDURE — 51702 INSERT TEMP BLADDER CATH: CPT

## 2024-01-22 NOTE — PROGRESS NOTES
Patient has given me verbal consent to perform catheter change Yes    Does patient have latex allergy?No  Does patient have shellfish or betadine allergy? No      Following Sandor Shoemaker PA-C plan of care. 18Fr Catheter changed without difficulty.    Once balloon was deflated, removed catheter without difficulty. Cleansed urethral opening with betadine swab. 18Fr Coude jane was inserted using sterile water-soluble lubricant without difficulty. Catheter was flushed with 35cc water ensuring return and inflated balloon with 10 ml of water. Jane Catheter was hooked up to leg bag.    Foreskin reduced back down? Yes    Patient instructed on how to drain catheter bags.    If patient was given a leg bag, patient was instructed to keep bag above the knee to prevent pulling on catheter. Pt notified if catheter is pulled on may cause pain and bleeding.     Patient was also instructed on how to switch from leg bag to overnight bag.          Supplies were provided by office

## 2024-01-22 NOTE — PROGRESS NOTES
I have personally verified, reviewed, and approved of these actions and the plan of care as documented.

## 2024-03-04 ENCOUNTER — NURSE ONLY (OUTPATIENT)
Dept: UROLOGY | Age: 89
End: 2024-03-04
Payer: MEDICARE

## 2024-03-04 DIAGNOSIS — R33.9 URINARY RETENTION: Primary | ICD-10-CM

## 2024-03-04 PROCEDURE — 51702 INSERT TEMP BLADDER CATH: CPT

## 2024-03-04 NOTE — PROGRESS NOTES
I have personally verified, reviewed, and approved of these actions and the plan for follow-up as documented by Ana Caldwell MA.

## 2024-03-04 NOTE — PROGRESS NOTES
Patient has given me verbal consent to perform catheter change Yes    Does patient have latex allergy?No  Does patient have shellfish or betadine allergy? No      Following Sandor Shoemaker PA-C of care. 18Fr coude Catheter changed without difficulty.    Once balloon was deflated, removed catheter without difficulty. Cleansed urethral opening with betadine swab. 18Fr Coude jane was inserted using sterile water-soluble lubricant without difficulty. Catheter was flushed with 35cc water ensuring return and inflated balloon with 10 ml of water. Jane Catheter was hooked up to leg bag.    Foreskin reduced back down? No    Patient instructed on how to drain catheter bags.    If patient was given a leg bag, patient was instructed to keep bag above the knee to prevent pulling on catheter. Pt notified if catheter is pulled on may cause pain and bleeding.     Patient was also instructed on how to switch from leg bag to overnight bag.          Supplies were provided by office

## 2024-03-06 ENCOUNTER — OFFICE VISIT (OUTPATIENT)
Dept: FAMILY MEDICINE CLINIC | Age: 89
End: 2024-03-06
Payer: MEDICARE

## 2024-03-06 VITALS
OXYGEN SATURATION: 100 % | WEIGHT: 174 LBS | SYSTOLIC BLOOD PRESSURE: 100 MMHG | BODY MASS INDEX: 23.57 KG/M2 | DIASTOLIC BLOOD PRESSURE: 58 MMHG | RESPIRATION RATE: 16 BRPM | HEART RATE: 72 BPM | HEIGHT: 72 IN | TEMPERATURE: 98.7 F

## 2024-03-06 DIAGNOSIS — I10 ESSENTIAL HYPERTENSION: ICD-10-CM

## 2024-03-06 DIAGNOSIS — Z00.00 MEDICARE ANNUAL WELLNESS VISIT, SUBSEQUENT: Primary | ICD-10-CM

## 2024-03-06 DIAGNOSIS — E78.5 HYPERLIPIDEMIA, UNSPECIFIED HYPERLIPIDEMIA TYPE: ICD-10-CM

## 2024-03-06 DIAGNOSIS — G89.29 CHRONIC LEFT SHOULDER PAIN: ICD-10-CM

## 2024-03-06 DIAGNOSIS — N18.32 STAGE 3B CHRONIC KIDNEY DISEASE (HCC): ICD-10-CM

## 2024-03-06 DIAGNOSIS — M25.512 CHRONIC LEFT SHOULDER PAIN: ICD-10-CM

## 2024-03-06 PROBLEM — L89.312 PRESSURE INJURY OF RIGHT BUTTOCK, STAGE 2 (HCC): Status: RESOLVED | Noted: 2023-09-06 | Resolved: 2024-03-06

## 2024-03-06 PROCEDURE — G0439 PPPS, SUBSEQ VISIT: HCPCS | Performed by: FAMILY MEDICINE

## 2024-03-06 PROCEDURE — 1123F ACP DISCUSS/DSCN MKR DOCD: CPT | Performed by: FAMILY MEDICINE

## 2024-03-06 PROCEDURE — G8484 FLU IMMUNIZE NO ADMIN: HCPCS | Performed by: FAMILY MEDICINE

## 2024-03-06 RX ORDER — SIMVASTATIN 5 MG
5 TABLET ORAL NIGHTLY
Qty: 90 TABLET | Refills: 3 | Status: SHIPPED | OUTPATIENT
Start: 2024-03-06

## 2024-03-06 RX ORDER — LISINOPRIL 10 MG/1
10 TABLET ORAL DAILY
Qty: 90 TABLET | Refills: 3 | Status: SHIPPED | OUTPATIENT
Start: 2024-03-06

## 2024-03-06 RX ORDER — AMLODIPINE BESYLATE 5 MG/1
5 TABLET ORAL DAILY
Qty: 90 TABLET | Refills: 3 | Status: SHIPPED | OUTPATIENT
Start: 2024-03-06

## 2024-03-06 SDOH — ECONOMIC STABILITY: FOOD INSECURITY: WITHIN THE PAST 12 MONTHS, YOU WORRIED THAT YOUR FOOD WOULD RUN OUT BEFORE YOU GOT MONEY TO BUY MORE.: NEVER TRUE

## 2024-03-06 SDOH — ECONOMIC STABILITY: FOOD INSECURITY: WITHIN THE PAST 12 MONTHS, THE FOOD YOU BOUGHT JUST DIDN'T LAST AND YOU DIDN'T HAVE MONEY TO GET MORE.: NEVER TRUE

## 2024-03-06 SDOH — ECONOMIC STABILITY: INCOME INSECURITY: HOW HARD IS IT FOR YOU TO PAY FOR THE VERY BASICS LIKE FOOD, HOUSING, MEDICAL CARE, AND HEATING?: NOT HARD AT ALL

## 2024-03-06 ASSESSMENT — PATIENT HEALTH QUESTIONNAIRE - PHQ9
2. FEELING DOWN, DEPRESSED OR HOPELESS: 0
SUM OF ALL RESPONSES TO PHQ QUESTIONS 1-9: 0
SUM OF ALL RESPONSES TO PHQ QUESTIONS 1-9: 0
SUM OF ALL RESPONSES TO PHQ9 QUESTIONS 1 & 2: 0
SUM OF ALL RESPONSES TO PHQ QUESTIONS 1-9: 0
SUM OF ALL RESPONSES TO PHQ QUESTIONS 1-9: 0
1. LITTLE INTEREST OR PLEASURE IN DOING THINGS: 0

## 2024-03-06 NOTE — PROGRESS NOTES
Medicare Annual Wellness Visit    Tiago Tian is here for Medicare AWV (Having issues with BP being too low  ) and Pain (Left shoulder pain - wants a shot )    Assessment & Plan   Medicare annual wellness visit, subsequent  Essential hypertension  -     amLODIPine (NORVASC) 5 MG tablet; Take 1 tablet by mouth daily, Disp-90 tablet, R-3Normal  -     lisinopril (PRINIVIL;ZESTRIL) 10 MG tablet; Take 1 tablet by mouth daily, Disp-90 tablet, R-3Normal  -     Comprehensive Metabolic Panel; Future  -     CBC with Auto Differential; Future  Hyperlipidemia, unspecified hyperlipidemia type  -     simvastatin (ZOCOR) 5 MG tablet; Take 1 tablet by mouth nightly, Disp-90 tablet, R-3Normal  Stage 3b chronic kidney disease (HCC)  -     Comprehensive Metabolic Panel; Future  -     CBC with Auto Differential; Future  Chronic left shoulder pain  -     XR SHOULDER LEFT (MIN 2 VIEWS); Future      Hypertension: Having hypotension.  Decrease Norvasc to 5 mg.  Continue lisinopril.  Check labs  CKD stage IIIb: Chronic.  Check status of control with CMP.  Monitor twice yearly    Left shoulder pain: Likely arthritis.  Obtain x-ray and return for shoulder injection next week.      Declines all vaccines    Recommendations for Preventive Services Due: see orders and patient instructions/AVS.  Recommended screening schedule for the next 5-10 years is provided to the patient in written form: see Patient Instructions/AVS.     Return in about 6 months (around 9/6/2024) for HTN/CKD; and 1 week appt for left shoulder injection.     Subjective       Left shoulder pain.  No injury.  Tylenol.  Pain has worsened.  No recent x-ray.      Low BPs at home.  On lisinopril and Norvasc    Has urinary catheter.    Fell into cough last night.    Wife and son are present    Patient's complete Health Risk Assessment and screening values have been reviewed and are found in Flowsheets. The following problems were reviewed today and where indicated follow up

## 2024-03-08 ENCOUNTER — HOSPITAL ENCOUNTER (OUTPATIENT)
Age: 89
Discharge: HOME | End: 2024-03-08
Payer: MEDICARE

## 2024-03-08 ENCOUNTER — HOSPITAL ENCOUNTER (OUTPATIENT)
Dept: GENERAL RADIOLOGY | Age: 89
Discharge: HOME | End: 2024-03-08
Attending: FAMILY MEDICINE
Payer: MEDICARE

## 2024-03-08 DIAGNOSIS — N18.32 STAGE 3B CHRONIC KIDNEY DISEASE (HCC): ICD-10-CM

## 2024-03-08 DIAGNOSIS — I10 ESSENTIAL HYPERTENSION: ICD-10-CM

## 2024-03-08 DIAGNOSIS — G89.29 CHRONIC LEFT SHOULDER PAIN: ICD-10-CM

## 2024-03-08 DIAGNOSIS — M25.512 CHRONIC LEFT SHOULDER PAIN: ICD-10-CM

## 2024-03-08 LAB
ALBUMIN SERPL BCG-MCNC: 4 G/DL (ref 3.5–5.1)
ALP SERPL-CCNC: 220 U/L (ref 38–126)
ALT SERPL W/O P-5'-P-CCNC: 7 U/L (ref 11–66)
ANION GAP SERPL CALC-SCNC: 13 MEQ/L (ref 8–16)
AST SERPL-CCNC: 16 U/L (ref 5–40)
BASOPHILS ABSOLUTE: 0 THOU/MM3 (ref 0–0.1)
BASOPHILS NFR BLD AUTO: 0.7 %
BILIRUB SERPL-MCNC: 0.4 MG/DL (ref 0.3–1.2)
BUN SERPL-MCNC: 19 MG/DL (ref 7–22)
CALCIUM SERPL-MCNC: 8.9 MG/DL (ref 8.5–10.5)
CHLORIDE SERPL-SCNC: 97 MEQ/L (ref 98–111)
CO2 SERPL-SCNC: 22 MEQ/L (ref 23–33)
CREAT SERPL-MCNC: 1.6 MG/DL (ref 0.4–1.2)
DEPRECATED RDW RBC AUTO: 47.8 FL (ref 35–45)
EOSINOPHIL NFR BLD AUTO: 1.6 %
EOSINOPHILS ABSOLUTE: 0.1 THOU/MM3 (ref 0–0.4)
ERYTHROCYTE [DISTWIDTH] IN BLOOD BY AUTOMATED COUNT: 13.2 % (ref 11.5–14.5)
GFR SERPL CREATININE-BSD FRML MDRD: 41 ML/MIN/1.73M2
GLUCOSE SERPL-MCNC: 90 MG/DL (ref 70–108)
HCT VFR BLD AUTO: 38.4 % (ref 42–52)
HGB BLD-MCNC: 12 GM/DL (ref 14–18)
IMM GRANULOCYTES # BLD AUTO: 0.02 THOU/MM3 (ref 0–0.07)
IMM GRANULOCYTES NFR BLD AUTO: 0.5 %
LYMPHOCYTES ABSOLUTE: 1.1 THOU/MM3 (ref 1–4.8)
LYMPHOCYTES NFR BLD AUTO: 24.1 %
MCH RBC QN AUTO: 30.9 PG (ref 26–33)
MCHC RBC AUTO-ENTMCNC: 31.3 GM/DL (ref 32.2–35.5)
MCV RBC AUTO: 99 FL (ref 80–94)
MONOCYTES ABSOLUTE: 0.3 THOU/MM3 (ref 0.4–1.3)
MONOCYTES NFR BLD AUTO: 7.7 %
NEUTROPHILS NFR BLD AUTO: 65.4 %
NRBC BLD AUTO-RTO: 0 /100 WBC
PLATELET # BLD AUTO: 136 THOU/MM3 (ref 130–400)
PMV BLD AUTO: 9.2 FL (ref 9.4–12.4)
POTASSIUM SERPL-SCNC: 4.6 MEQ/L (ref 3.5–5.2)
PROT SERPL-MCNC: 7.6 G/DL (ref 6.1–8)
RBC # BLD AUTO: 3.88 MILL/MM3 (ref 4.7–6.1)
SEGMENTED NEUTROPHILS ABSOLUTE COUNT: 2.9 THOU/MM3 (ref 1.8–7.7)
SODIUM SERPL-SCNC: 132 MEQ/L (ref 135–145)
WBC # BLD AUTO: 4.4 THOU/MM3 (ref 4.8–10.8)

## 2024-03-08 PROCEDURE — 80053 COMPREHEN METABOLIC PANEL: CPT

## 2024-03-08 PROCEDURE — 36415 COLL VENOUS BLD VENIPUNCTURE: CPT

## 2024-03-08 PROCEDURE — 85025 COMPLETE CBC W/AUTO DIFF WBC: CPT

## 2024-03-08 PROCEDURE — 73030 X-RAY EXAM OF SHOULDER: CPT

## 2024-03-13 ENCOUNTER — OFFICE VISIT (OUTPATIENT)
Dept: FAMILY MEDICINE CLINIC | Age: 89
End: 2024-03-13

## 2024-03-13 VITALS
HEIGHT: 72 IN | BODY MASS INDEX: 24.38 KG/M2 | OXYGEN SATURATION: 99 % | TEMPERATURE: 98.6 F | SYSTOLIC BLOOD PRESSURE: 120 MMHG | DIASTOLIC BLOOD PRESSURE: 62 MMHG | HEART RATE: 81 BPM | WEIGHT: 180 LBS

## 2024-03-13 DIAGNOSIS — M25.512 CHRONIC LEFT SHOULDER PAIN: Primary | ICD-10-CM

## 2024-03-13 DIAGNOSIS — M19.012 ARTHRITIS OF LEFT SHOULDER REGION: ICD-10-CM

## 2024-03-13 DIAGNOSIS — G89.29 CHRONIC LEFT SHOULDER PAIN: Primary | ICD-10-CM

## 2024-03-13 RX ORDER — TRIAMCINOLONE ACETONIDE 40 MG/ML
80 INJECTION, SUSPENSION INTRA-ARTICULAR; INTRAMUSCULAR ONCE
Status: COMPLETED | OUTPATIENT
Start: 2024-03-13 | End: 2024-03-13

## 2024-03-13 RX ORDER — LIDOCAINE HYDROCHLORIDE 10 MG/ML
6 INJECTION, SOLUTION INFILTRATION; PERINEURAL ONCE
Status: COMPLETED | OUTPATIENT
Start: 2024-03-13 | End: 2024-03-13

## 2024-03-13 RX ADMIN — LIDOCAINE HYDROCHLORIDE 6 ML: 10 INJECTION, SOLUTION INFILTRATION; PERINEURAL at 11:03

## 2024-03-13 RX ADMIN — TRIAMCINOLONE ACETONIDE 80 MG: 40 INJECTION, SUSPENSION INTRA-ARTICULAR; INTRAMUSCULAR at 11:04

## 2024-03-13 NOTE — PROGRESS NOTES
[facility]ESSION[facility]  [address]  [facility] [address]  Dept: [phone]  Loc: [phone]    Chief Complaint   Patient presents with    Shoulder Pain     Left shoulder injection      Wife and son are present    Vitals:    03/13/24 1045   BP: 120/62   Pulse: 81   Temp: 98.6 °F (37 °C)   TempSrc: Tympanic   SpO2: 99%   Weight: 81.6 kg (180 lb)   Height: 1.829 m (6')       Procedure Note Left Shoulder Injection    Diagnosis:  OA    Discussed risks and benefits of steroid injection, including but not limited to infection, skin discoloration, pain, and bleeding.    With the patient's verbal informed consent, his left shoulder was prepped in standard sterile fashion with Betadine and Alcohol.  Ethyl chloride was used to anesthetize the skin.  A mixture of 6 cc of 1% Lidocaine and 2 cc of Kenalog 40 mg was injected into the left shoulder using a posterior approach.  The patient tolerated this well without difficulty.  A band-aid was applied and the patient was advised to ice the shoulder for 15-20 minutes to relieve any injection site related pain.    Follow Up: As Directed.     Diagnosis Orders   1. Chronic left shoulder pain  KS ARTHROCENTESIS ASPIR&/INJ MAJOR JT/BURSA W/O US    lidocaine 1 % injection 6 mL    triamcinolone acetonide (KENALOG-40) injection 80 mg      2. Arthritis of left shoulder region  KS ARTHROCENTESIS ASPIR&/INJ MAJOR JT/BURSA W/O US    lidocaine 1 % injection 6 mL    triamcinolone acetonide (KENALOG-40) injection 80 mg        Iain Hannah MD

## 2024-04-15 ENCOUNTER — NURSE ONLY (OUTPATIENT)
Dept: UROLOGY | Age: 89
End: 2024-04-15
Payer: MEDICARE

## 2024-04-15 DIAGNOSIS — R33.9 URINARY RETENTION: Primary | ICD-10-CM

## 2024-04-15 PROCEDURE — 51702 INSERT TEMP BLADDER CATH: CPT

## 2024-04-15 NOTE — PROGRESS NOTES
Patient has given me verbal consent to perform catheter change Yes    DIAGNOSIS/INDICATION:  Urinary retention    Does patient have latex allergy?No  Does patient have shellfish or betadine allergy? No      Following Sandor SERVIN plan of care. 18Fr Catheter changed without difficulty.    Once balloon was deflated, removed catheter without difficulty. Cleansed urethral opening with betadine swab. 18Fr Coude jane was inserted using sterile water-soluble lubricant without difficulty. Catheter was flushed with 35cc water ensuring return and inflated balloon with 10 ml of water. Jane Catheter was hooked up to leg bag.    Patient instructed on how to drain catheter bags.    If patient was given a leg bag, patient was instructed to keep bag above the knee to prevent pulling on catheter. Pt notified if catheter is pulled on may cause pain and bleeding.     Patient was also instructed on how to switch from leg bag to overnight bag.          Supplies were provided by office

## 2024-05-22 ENCOUNTER — OFFICE VISIT (OUTPATIENT)
Dept: FAMILY MEDICINE CLINIC | Age: 89
End: 2024-05-22
Payer: MEDICARE

## 2024-05-22 ENCOUNTER — HOSPITAL ENCOUNTER (OUTPATIENT)
Dept: GENERAL RADIOLOGY | Age: 89
Discharge: HOME | End: 2024-05-22
Payer: MEDICARE

## 2024-05-22 ENCOUNTER — HOSPITAL ENCOUNTER (OUTPATIENT)
Age: 89
Discharge: HOME | End: 2024-05-22
Payer: MEDICARE

## 2024-05-22 VITALS
OXYGEN SATURATION: 97 % | RESPIRATION RATE: 16 BRPM | DIASTOLIC BLOOD PRESSURE: 64 MMHG | SYSTOLIC BLOOD PRESSURE: 120 MMHG | TEMPERATURE: 97.8 F | HEART RATE: 85 BPM

## 2024-05-22 DIAGNOSIS — S70.02XD: ICD-10-CM

## 2024-05-22 DIAGNOSIS — S70.02XD: Primary | ICD-10-CM

## 2024-05-22 DIAGNOSIS — Z96.642 STATUS POST TOTAL REPLACEMENT OF LEFT HIP: ICD-10-CM

## 2024-05-22 PROCEDURE — G8420 CALC BMI NORM PARAMETERS: HCPCS | Performed by: FAMILY MEDICINE

## 2024-05-22 PROCEDURE — 1123F ACP DISCUSS/DSCN MKR DOCD: CPT | Performed by: FAMILY MEDICINE

## 2024-05-22 PROCEDURE — G8427 DOCREV CUR MEDS BY ELIG CLIN: HCPCS | Performed by: FAMILY MEDICINE

## 2024-05-22 PROCEDURE — 73502 X-RAY EXAM HIP UNI 2-3 VIEWS: CPT

## 2024-05-22 PROCEDURE — 1036F TOBACCO NON-USER: CPT | Performed by: FAMILY MEDICINE

## 2024-05-22 PROCEDURE — 99213 OFFICE O/P EST LOW 20 MIN: CPT | Performed by: FAMILY MEDICINE

## 2024-05-22 ASSESSMENT — ENCOUNTER SYMPTOMS: COLOR CHANGE: 1

## 2024-05-22 NOTE — PROGRESS NOTES
[facility] PROFESSIONAL [facility]  [address]  [facility] [address]  Dept: [phone]  Dept Fax: [phone]  Loc: [phone]  PROGRESS NOTE      Visit Date: 5/22/2024    Tiago Tian is a 89 y.o. male who presents today for:  Chief Complaint   Patient presents with    Other     Family states that patient has a bruise on his left hip that they are unsure how long it has been there and they feel his hip does not look right. Patient has been experiencing forgetfulness lately states family.       Chief complaint:  left hip bruise    Subjective:  Other      Left hip pain.  Hx of LISHA.  Bruising is present. Ambulates with walker. No pain with weightbearing LLE.     Has had falls.   Difficulty getting out of chair.       Wife and son are present  Son lives with patient     Review of Systems   Musculoskeletal:  Positive for gait problem.   Skin:  Positive for color change.   Neurological:  Negative for dizziness and light-headedness.     Patient Active Problem List   Diagnosis    Hyperlipidemia    Osteoarthritis    CRF (chronic renal failure)    Primary osteoarthritis of left hip    HTN (hypertension)    Cholecystitis    Stage 3b chronic kidney disease (HCC)    Elevated alkaline phosphatase level    Constipation    Enlarged prostate    Splenomegaly    Urinary retention    Mild aortic regurgitation    Mild mitral regurgitation    Mild tricuspid regurgitation    S/P laparoscopic cholecystectomy    S/P right inguinal hernia repair    Paget disease of bone     Past Medical History:   Diagnosis Date    BPH with elevated PSA     Elevated PSA     Hyperlipidemia     Hypertension     Osteoarthritis     PAC (premature atrial contraction)     Paget disease of bone     S/P laparoscopic cholecystectomy 07/18/2018    S/P right inguinal hernia repair 07/18/2018    Urinary retention       Past Surgical History:   Procedure Laterality Date    JOINT REPLACEMENT Bilateral 2013    HIP

## 2024-05-29 ENCOUNTER — NURSE ONLY (OUTPATIENT)
Dept: UROLOGY | Age: 89
End: 2024-05-29
Payer: MEDICARE

## 2024-05-29 DIAGNOSIS — R33.9 URINARY RETENTION: Primary | ICD-10-CM

## 2024-05-29 PROCEDURE — 51702 INSERT TEMP BLADDER CATH: CPT

## 2024-05-29 NOTE — PROGRESS NOTES
Patient has given me verbal consent to perform catheter change Yes    DIAGNOSIS/INDICATION:  Urinary retention    Does patient have latex allergy?No  Does patient have shellfish or betadine allergy? No      Following Lilian SERVIN plan of care. 18Fr Catheter changed without difficulty.    Once balloon was deflated, removed catheter without difficulty. Cleansed urethral opening with betadine swab. 18Fr Coude jane was inserted using sterile water-soluble lubricant without difficulty. Catheter was flushed with 50cc water ensuring return and inflated balloon with 10 ml of water. Jane Catheter was hooked up to leg bag.    Foreskin reduced back down? Yes    Patient instructed on how to drain catheter bags.    If patient was given a leg bag, patient was instructed to keep bag above the knee to prevent pulling on catheter. Pt notified if catheter is pulled on may cause pain and bleeding.     Patient was also instructed on how to switch from leg bag to overnight bag.          Supplies were provided by office

## 2024-06-20 NOTE — DISCHARGE INSTRUCTIONS
Discharge instructions: Cystoscopy  You May experience painful urination and see blood in the urine after your procedure. This should resolve over time. Pt ok to discharge home in good condition  No heavy lifting, >10 lbs for today  Pt should avoid strenuous activity for today  Pt should walk moderately at home  Pt ok to shower   Pt may resume diet as tolerated  Please call attending physician or hospital  with questions  Call or Present to ED if fever (> 101F), intractable nausea vomiting or pain. Begin taking doxycycline    Pt should follow up with Dr. Liam Brown in 3 months. Family requesting patient be set up with home health nurse. Catheter change every 4 weeks.
Plan: Sun protection. Discussed at length importance of sun protection, including avoidance of sun at peak hours (10 am-2 pm), protective clothing, and use of sunscreen with SPF 30 or greater with frequent reapplication (every 1 to 1.5 hours, or more frequently if in water or if heavy sweating).\\n- OTC medication recommended: Spf 30 or higher, Best sunscreens use zinc or titanium oxide as the active ingredient and act as a physical blocker.
Detail Level: Zone

## 2024-07-08 ENCOUNTER — OFFICE VISIT (OUTPATIENT)
Dept: UROLOGY | Age: 89
End: 2024-07-08
Payer: MEDICARE

## 2024-07-08 VITALS — HEIGHT: 72 IN | WEIGHT: 180 LBS | BODY MASS INDEX: 24.38 KG/M2 | RESPIRATION RATE: 16 BRPM

## 2024-07-08 DIAGNOSIS — R33.9 URINARY RETENTION: Primary | ICD-10-CM

## 2024-07-08 PROCEDURE — 51702 INSERT TEMP BLADDER CATH: CPT | Performed by: UROLOGY

## 2024-07-08 PROCEDURE — G8420 CALC BMI NORM PARAMETERS: HCPCS | Performed by: UROLOGY

## 2024-07-08 PROCEDURE — 1123F ACP DISCUSS/DSCN MKR DOCD: CPT | Performed by: UROLOGY

## 2024-07-08 PROCEDURE — G8427 DOCREV CUR MEDS BY ELIG CLIN: HCPCS | Performed by: UROLOGY

## 2024-07-08 PROCEDURE — 99213 OFFICE O/P EST LOW 20 MIN: CPT | Performed by: UROLOGY

## 2024-07-08 PROCEDURE — 1036F TOBACCO NON-USER: CPT | Performed by: UROLOGY

## 2024-07-08 NOTE — PROGRESS NOTES
Dr. Zander John Jr, MD MD  [facility] Consultation / FU Visit    Patient:  Tiago Tian  YOB: 1933  Date: 7/8/2024  Consult requested from Iain Hannah MD     HISTORY OF PRESENT ILLNESS:   The patient is a 89 y.o. male who presents today for follow-up for the following problem(s): BPH  Overall the problem(s) : are worsening.  Associated Symptoms: No dysuria, gross hematuria.   Pain Severity:      Today visit:   7/8/24  Mr. Tian is an 89-year-old male that presents with urinary retention secondary to BPH, offered TURP Greenlight, however the patient elects for catheter drainage.   Offered SPT     His last PSA obtained was in 11/2018 and was elevated at 19.49. Per Patrick Davalos CNP note, the patient was made aware that a PSA level is obtained for prostate cancer screening and cannot diagnose prostate cancer alone. He was offered a prostate biopsy at the time, and did not wish to pursue it then or in the futur    Summary of old records:   (Patient's old records, notes and chart reviewed and summarized above.)    Last several PSA's:  Lab Results   Component Value Date    PSA 27.36 (H) 08/15/2023    PSA 19.49 (H) 11/01/2018       Last total testosterone:  No results found for: \"TESTOSTERONE\"    Urinalysis today:  No results found for this visit on 07/08/24.      Last BUN and creatinine:  Lab Results   Component Value Date    BUN 19 03/08/2024     Lab Results   Component Value Date    CREATININE 1.6 (H) 03/08/2024       Imaging Reviewed during this Office Visit:   (results were independently reviewed by physician and radiology report verified)    PAST MEDICAL, FAMILY AND SOCIAL HISTORY:  Past Medical History:   Diagnosis Date    BPH with elevated PSA     Elevated PSA     Hyperlipidemia     Hypertension     Osteoarthritis     PAC (premature atrial contraction)     Paget disease of bone     S/P laparoscopic cholecystectomy 07/18/2018    S/P right inguinal hernia repair 07/18/2018

## 2024-07-08 NOTE — PROGRESS NOTES
Patient has given me verbal consent to perform catheter change Yes    DIAGNOSIS/INDICATION:  Urinary retention    Does patient have latex allergy?No  Does patient have shellfish or betadine allergy? No      Following Dr John plan of care. 18Fr Catheter changed without difficulty.    Once balloon was deflated, removed catheter without difficulty. Cleansed urethral opening with betadine swab. 18Fr Coude jane was inserted using sterile water-soluble lubricant without difficulty. Catheter was flushed with 35cc water ensuring return and inflated balloon with 10 ml of water. Jane Catheter was hooked up to leg bag.    Foreskin reduced back down? Yes    Patient instructed on how to drain catheter bags.    If patient was given a leg bag, patient was instructed to keep bag above the knee to prevent pulling on catheter. Pt notified if catheter is pulled on may cause pain and bleeding.     Patient was also instructed on how to switch from leg bag to overnight bag.          Supplies were provided by office

## 2024-08-19 ENCOUNTER — NURSE ONLY (OUTPATIENT)
Dept: UROLOGY | Age: 89
End: 2024-08-19
Payer: MEDICARE

## 2024-08-19 DIAGNOSIS — R33.9 URINARY RETENTION: Primary | ICD-10-CM

## 2024-08-19 PROCEDURE — 51702 INSERT TEMP BLADDER CATH: CPT

## 2024-08-19 NOTE — PROGRESS NOTES
Patient has given me verbal consent to perform catheter placement  Yes    DIAGNOSIS/INDICATION:  Urinary Retention    Does patient have latex allergy?  No  Does patient have shellfish or betadine allergy?  No      Following Sandor Shoemaker PA-C plan of care. Inserted 18 Fr  Catheter without difficulty.    Patient's urethra was cleansed with betadine swab. 18 Fr Coude jane was inserted using sterile water-soluble lubricant without difficulty and inflated balloon with 10 ml of water. Jane Catheter was hooked up to leg.    Foreskin reduced back down?  Yes    Patient instructed on draining catheter bags.    Patient instructed to keep leg bag above the knee to prevent pulling on catheter causing blood.    Patient instructed on how to switch from leg bag to overnight bag.

## 2024-09-06 ENCOUNTER — OFFICE VISIT (OUTPATIENT)
Dept: FAMILY MEDICINE CLINIC | Age: 89
End: 2024-09-06

## 2024-09-06 ENCOUNTER — HOSPITAL ENCOUNTER (OUTPATIENT)
Age: 89
Discharge: HOME | End: 2024-09-06
Payer: MEDICARE

## 2024-09-06 ENCOUNTER — TELEPHONE (OUTPATIENT)
Dept: FAMILY MEDICINE CLINIC | Age: 89
End: 2024-09-06

## 2024-09-06 VITALS
HEART RATE: 109 BPM | DIASTOLIC BLOOD PRESSURE: 70 MMHG | HEIGHT: 72 IN | OXYGEN SATURATION: 97 % | SYSTOLIC BLOOD PRESSURE: 128 MMHG | TEMPERATURE: 97.7 F | RESPIRATION RATE: 12 BRPM | BODY MASS INDEX: 24.38 KG/M2 | WEIGHT: 180 LBS

## 2024-09-06 DIAGNOSIS — R53.81 PHYSICAL DECONDITIONING: ICD-10-CM

## 2024-09-06 DIAGNOSIS — N18.32 STAGE 3B CHRONIC KIDNEY DISEASE (HCC): ICD-10-CM

## 2024-09-06 DIAGNOSIS — R54 AGE-RELATED PHYSICAL DEBILITY: ICD-10-CM

## 2024-09-06 DIAGNOSIS — I10 ESSENTIAL HYPERTENSION: ICD-10-CM

## 2024-09-06 DIAGNOSIS — R29.6 FREQUENT FALLS: ICD-10-CM

## 2024-09-06 DIAGNOSIS — I10 ESSENTIAL HYPERTENSION: Primary | ICD-10-CM

## 2024-09-06 DIAGNOSIS — E78.5 HYPERLIPIDEMIA, UNSPECIFIED HYPERLIPIDEMIA TYPE: ICD-10-CM

## 2024-09-06 LAB
ALBUMIN SERPL BCG-MCNC: 3.7 G/DL (ref 3.5–5.1)
ALP SERPL-CCNC: 219 U/L (ref 38–126)
ALT SERPL W/O P-5'-P-CCNC: 11 U/L (ref 11–66)
ANION GAP SERPL CALC-SCNC: 10 MEQ/L (ref 8–16)
AST SERPL-CCNC: 17 U/L (ref 5–40)
BASOPHILS ABSOLUTE: 0 THOU/MM3 (ref 0–0.1)
BASOPHILS NFR BLD AUTO: 0.4 %
BILIRUB SERPL-MCNC: 0.4 MG/DL (ref 0.3–1.2)
BUN SERPL-MCNC: 19 MG/DL (ref 7–22)
CALCIUM SERPL-MCNC: 8.4 MG/DL (ref 8.5–10.5)
CHLORIDE SERPL-SCNC: 101 MEQ/L (ref 98–111)
CO2 SERPL-SCNC: 21 MEQ/L (ref 23–33)
CREAT SERPL-MCNC: 1.6 MG/DL (ref 0.4–1.2)
DEPRECATED RDW RBC AUTO: 46.6 FL (ref 35–45)
EOSINOPHIL NFR BLD AUTO: 1.1 %
EOSINOPHILS ABSOLUTE: 0.1 THOU/MM3 (ref 0–0.4)
ERYTHROCYTE [DISTWIDTH] IN BLOOD BY AUTOMATED COUNT: 13.5 % (ref 11.5–14.5)
GFR SERPL CREATININE-BSD FRML MDRD: 40 ML/MIN/1.73M2
GLUCOSE SERPL-MCNC: 132 MG/DL (ref 70–108)
HCT VFR BLD AUTO: 35.8 % (ref 42–52)
HGB BLD-MCNC: 11.3 GM/DL (ref 14–18)
IMM GRANULOCYTES # BLD AUTO: 0.03 THOU/MM3 (ref 0–0.07)
IMM GRANULOCYTES NFR BLD AUTO: 0.6 %
LYMPHOCYTES ABSOLUTE: 1.2 THOU/MM3 (ref 1–4.8)
LYMPHOCYTES NFR BLD AUTO: 22.7 %
MCH RBC QN AUTO: 30 PG (ref 26–33)
MCHC RBC AUTO-ENTMCNC: 31.6 GM/DL (ref 32.2–35.5)
MCV RBC AUTO: 95 FL (ref 80–94)
MONOCYTES ABSOLUTE: 0.3 THOU/MM3 (ref 0.4–1.3)
MONOCYTES NFR BLD AUTO: 4.9 %
NEUTROPHILS ABSOLUTE: 3.7 THOU/MM3 (ref 1.8–7.7)
NEUTROPHILS NFR BLD AUTO: 70.3 %
NRBC BLD AUTO-RTO: 0 /100 WBC
PLATELET # BLD AUTO: 127 THOU/MM3 (ref 130–400)
PMV BLD AUTO: 9.2 FL (ref 9.4–12.4)
POTASSIUM SERPL-SCNC: 4.2 MEQ/L (ref 3.5–5.2)
PROT SERPL-MCNC: 6.7 G/DL (ref 6.1–8)
RBC # BLD AUTO: 3.77 MILL/MM3 (ref 4.7–6.1)
SODIUM SERPL-SCNC: 132 MEQ/L (ref 135–145)
WBC # BLD AUTO: 5.3 THOU/MM3 (ref 4.8–10.8)

## 2024-09-06 PROCEDURE — 85025 COMPLETE CBC W/AUTO DIFF WBC: CPT

## 2024-09-06 PROCEDURE — 80053 COMPREHEN METABOLIC PANEL: CPT

## 2024-09-06 PROCEDURE — 36415 COLL VENOUS BLD VENIPUNCTURE: CPT

## 2024-09-06 ASSESSMENT — ENCOUNTER SYMPTOMS
WHEEZING: 0
SHORTNESS OF BREATH: 0

## 2024-09-06 NOTE — TELEPHONE ENCOUNTER
Received call from [facility] orders received do not include a billable diagnosis for medicare    Patient does not qualify for nursing but will qualify for therapies    States chronic kidney disease and HTN can be used  Requesting new order be entered     Also requesting verification that patient is homebound

## 2024-09-06 NOTE — PROGRESS NOTES
[facility] PROFESSIONAL [facility] MEDICINE  [address]  [facility] [address]  Dept: [phone]  Dept Fax: [phone]  Loc: [phone]  PROGRESS NOTE      Visit Date: 9/6/2024    Tiago Tian is a 89 y.o. male who presents today for:  Chief Complaint   Patient presents with    Chronic Kidney Disease     6 month follow up    Hypertension    Referral - General     Pt son states that he think he needs therapy. Having frequent fall and problems with mobility.       Subjective:  Hypertension  Pertinent negatives include no chest pain or shortness of breath.       6 month   HTN:  on lisinopril and norvasc.  Normal BPs at home    Hyperlipidemia.  On zocor    Elevated PSA.  Patient has declined prostate biopsy to [facility] for cancer. sees urology.  Has catheter.    Frequent falls. Not very active.  Uses walker in home. Not doing stationary bike.     Toilets himself.  Feeds self  Needs help cooking, bathing.  He does not do laundry, clean house, or other tasks    Son and wife are present    Review of Systems   Respiratory:  Negative for shortness of breath and wheezing.    Cardiovascular:  Negative for chest pain.   Neurological:  Positive for weakness (generalized). Negative for dizziness, syncope and light-headedness.     Patient Active Problem List   Diagnosis    Hyperlipidemia    Osteoarthritis    CRF (chronic renal failure)    Primary osteoarthritis of left hip    HTN (hypertension)    Cholecystitis    Stage 3b chronic kidney disease (HCC)    Elevated alkaline phosphatase level    Constipation    Enlarged prostate    Splenomegaly    Urinary retention    Mild aortic regurgitation    Mild mitral regurgitation    Mild tricuspid regurgitation    S/P laparoscopic cholecystectomy    S/P right inguinal hernia repair    Paget disease of bone     Past Medical History:   Diagnosis Date    BPH with elevated PSA     Elevated PSA     Hyperlipidemia     Hypertension     Osteoarthritis

## 2024-09-06 NOTE — TELEPHONE ENCOUNTER
New referral to [facility] home [facility] placed.  Please advise [facility]

## 2024-09-30 ENCOUNTER — NURSE ONLY (OUTPATIENT)
Dept: UROLOGY | Age: 89
End: 2024-09-30
Payer: MEDICARE

## 2024-09-30 DIAGNOSIS — R33.9 URINARY RETENTION: Primary | ICD-10-CM

## 2024-09-30 PROCEDURE — 51702 INSERT TEMP BLADDER CATH: CPT

## 2024-09-30 NOTE — PROGRESS NOTES
I have personally verified, reviewed, and approved of these actions as documented. Present in 4-5 weeks for jane catheter exchange.

## 2024-09-30 NOTE — PROGRESS NOTES
Patient has given me verbal consent to perform catheter change Yes    DIAGNOSIS/INDICATION:  Urinary retention    Does patient have latex allergy?No  Does patient have shellfish or betadine allergy? No      Following Sandor SERVIN plan of care. 18Fr Catheter changed without difficulty.    Once balloon was deflated, removed catheter without difficulty. Cleansed urethral opening with betadine swab. 18Fr Coude jane was inserted using sterile water-soluble lubricant without difficulty. Catheter was flushed with 35cc water ensuring return and inflated balloon with 10 ml of water. Jane Catheter was hooked up to leg bag.    Foreskin reduced back down? N/A    Patient instructed on how to drain catheter bags.    If patient was given a leg bag, patient was instructed to keep bag above the knee to prevent pulling on catheter. Pt notified if catheter is pulled on may cause pain and bleeding.     Patient was also instructed on how to switch from leg bag to overnight bag.          Supplies were provided by office

## 2024-11-11 ENCOUNTER — NURSE ONLY (OUTPATIENT)
Dept: UROLOGY | Age: 89
End: 2024-11-11
Payer: MEDICARE

## 2024-11-11 DIAGNOSIS — R33.9 URINARY RETENTION: Primary | ICD-10-CM

## 2024-11-11 PROCEDURE — 51702 INSERT TEMP BLADDER CATH: CPT

## 2024-11-11 NOTE — PROGRESS NOTES
Patient has given me verbal consent to perform catheter change Yes    DIAGNOSIS/INDICATION:  urinary Retention    Does patient have latex allergy?No  Does patient have shellfish or betadine allergy? No      Following Sandor SERVIN plan of care. 18Fr Catheter changed without difficulty.    Once balloon was deflated, removed catheter without difficulty. Cleansed urethral opening with betadine swab. 18Fr Coude jane was inserted using sterile water-soluble lubricant without difficulty. Catheter was flushed with 35cc water ensuring return and inflated balloon with 10 ml of water. Jane Catheter was hooked up to leg bag.    Foreskin reduced back down? NA    Patient instructed on how to drain catheter bags.    If patient was given a leg bag, patient was instructed to keep bag above the knee to prevent pulling on catheter. Pt notified if catheter is pulled on may cause pain and bleeding.     Patient was also instructed on how to switch from leg bag to overnight bag.          Supplies were provided by office

## 2024-11-18 ENCOUNTER — OFFICE VISIT (OUTPATIENT)
Dept: FAMILY MEDICINE CLINIC | Age: 89
End: 2024-11-18
Payer: MEDICARE

## 2024-11-18 ENCOUNTER — HOSPITAL ENCOUNTER (OUTPATIENT)
Age: 89
Discharge: HOME | End: 2024-11-18
Payer: MEDICARE

## 2024-11-18 VITALS
BODY MASS INDEX: 24.38 KG/M2 | HEIGHT: 72 IN | WEIGHT: 180 LBS | OXYGEN SATURATION: 98 % | SYSTOLIC BLOOD PRESSURE: 114 MMHG | DIASTOLIC BLOOD PRESSURE: 56 MMHG | HEART RATE: 79 BPM

## 2024-11-18 DIAGNOSIS — I49.9 IRREGULAR HEARTBEAT: ICD-10-CM

## 2024-11-18 DIAGNOSIS — I48.91 ATRIAL FIBRILLATION, UNSPECIFIED TYPE (HCC): ICD-10-CM

## 2024-11-18 DIAGNOSIS — D69.6 THROMBOCYTOPENIA, UNSPECIFIED (HCC): ICD-10-CM

## 2024-11-18 DIAGNOSIS — I10 ESSENTIAL HYPERTENSION: Primary | ICD-10-CM

## 2024-11-18 DIAGNOSIS — R29.6 FREQUENT FALLS: ICD-10-CM

## 2024-11-18 DIAGNOSIS — I10 ESSENTIAL HYPERTENSION: ICD-10-CM

## 2024-11-18 DIAGNOSIS — R94.31 ABNORMAL ELECTROCARDIOGRAM (ECG) (EKG): ICD-10-CM

## 2024-11-18 LAB
BASOPHILS ABSOLUTE: 0 THOU/MM3 (ref 0–0.1)
BASOPHILS NFR BLD AUTO: 0.1 %
DEPRECATED RDW RBC AUTO: 50.5 FL (ref 35–45)
EOSINOPHIL NFR BLD AUTO: 0.7 %
EOSINOPHILS ABSOLUTE: 0.1 THOU/MM3 (ref 0–0.4)
ERYTHROCYTE [DISTWIDTH] IN BLOOD BY AUTOMATED COUNT: 14.6 % (ref 11.5–14.5)
HCT VFR BLD AUTO: 37.6 % (ref 42–52)
HGB BLD-MCNC: 11.7 GM/DL (ref 14–18)
IMM GRANULOCYTES # BLD AUTO: 0.02 THOU/MM3 (ref 0–0.07)
IMM GRANULOCYTES NFR BLD AUTO: 0.3 %
LYMPHOCYTES ABSOLUTE: 2.8 THOU/MM3 (ref 1–4.8)
LYMPHOCYTES NFR BLD AUTO: 38.8 %
MCH RBC QN AUTO: 29.5 PG (ref 26–33)
MCHC RBC AUTO-ENTMCNC: 31.1 GM/DL (ref 32.2–35.5)
MCV RBC AUTO: 94.7 FL (ref 80–94)
MONOCYTES ABSOLUTE: 0.4 THOU/MM3 (ref 0.4–1.3)
MONOCYTES NFR BLD AUTO: 5.2 %
NEUTROPHILS ABSOLUTE: 4 THOU/MM3 (ref 1.8–7.7)
NEUTROPHILS NFR BLD AUTO: 54.9 %
NRBC BLD AUTO-RTO: 0 /100 WBC
PLATELET # BLD AUTO: 124 THOU/MM3 (ref 130–400)
PMV BLD AUTO: 9.8 FL (ref 9.4–12.4)
RBC # BLD AUTO: 3.97 MILL/MM3 (ref 4.7–6.1)
WBC # BLD AUTO: 7.3 THOU/MM3 (ref 4.8–10.8)

## 2024-11-18 PROCEDURE — 99215 OFFICE O/P EST HI 40 MIN: CPT | Performed by: NURSE PRACTITIONER

## 2024-11-18 PROCEDURE — 1123F ACP DISCUSS/DSCN MKR DOCD: CPT | Performed by: NURSE PRACTITIONER

## 2024-11-18 PROCEDURE — G8427 DOCREV CUR MEDS BY ELIG CLIN: HCPCS | Performed by: NURSE PRACTITIONER

## 2024-11-18 PROCEDURE — 36415 COLL VENOUS BLD VENIPUNCTURE: CPT

## 2024-11-18 PROCEDURE — 84443 ASSAY THYROID STIM HORMONE: CPT

## 2024-11-18 PROCEDURE — G8484 FLU IMMUNIZE NO ADMIN: HCPCS | Performed by: NURSE PRACTITIONER

## 2024-11-18 PROCEDURE — G8420 CALC BMI NORM PARAMETERS: HCPCS | Performed by: NURSE PRACTITIONER

## 2024-11-18 PROCEDURE — 1159F MED LIST DOCD IN RCRD: CPT | Performed by: NURSE PRACTITIONER

## 2024-11-18 PROCEDURE — 84439 ASSAY OF FREE THYROXINE: CPT

## 2024-11-18 PROCEDURE — 80053 COMPREHEN METABOLIC PANEL: CPT

## 2024-11-18 PROCEDURE — 93000 ELECTROCARDIOGRAM COMPLETE: CPT | Performed by: NURSE PRACTITIONER

## 2024-11-18 PROCEDURE — 1160F RVW MEDS BY RX/DR IN RCRD: CPT | Performed by: NURSE PRACTITIONER

## 2024-11-18 PROCEDURE — 1036F TOBACCO NON-USER: CPT | Performed by: NURSE PRACTITIONER

## 2024-11-18 PROCEDURE — 85025 COMPLETE CBC W/AUTO DIFF WBC: CPT

## 2024-11-18 RX ORDER — METOPROLOL SUCCINATE 25 MG/1
25 TABLET, EXTENDED RELEASE ORAL DAILY
Qty: 30 TABLET | Refills: 0 | Status: SHIPPED | OUTPATIENT
Start: 2024-11-18

## 2024-11-18 ASSESSMENT — ENCOUNTER SYMPTOMS: VISUAL CHANGE: 0

## 2024-11-18 NOTE — PROGRESS NOTES
[facility] PROFESSIONAL [facility] MEDICINE  [address]  [facility] [address]  Dept: [phone]  Loc: [phone]     Tiago Tian (:  1933) is a 89 y.o. male, here for evaluation of the following chief complaint(s):  Tachycardia (dizziness)      ASSESSMENT/PLAN:  1. Essential hypertension  Comments:  chronic. stable. will discontinue amlodipine due to starting metoprolol for a-fib  Orders:  -     EKG 12 Lead - Clinic Performed; Future  -     Comprehensive Metabolic Panel; Future  -     CBC with Auto Differential; Future  -     TSH; Future  -     T4, Free; Future  -     Echo (TTE) complete (PRN contrast/bubble/strain/3D); Future  -     metoprolol succinate (TOPROL XL) 25 MG extended release tablet; Take 1 tablet by mouth daily, Disp-30 tablet, R-0Normal  2. Irregular heartbeat  Comments:  a-fib without RVR. labs today. echo to be completed. adding metoprolol to regimen. will discontinue amlodipine  Orders:  -     Comprehensive Metabolic Panel; Future  -     CBC with Auto Differential; Future  -     TSH; Future  -     T4, Free; Future  -     Echo (TTE) complete (PRN contrast/bubble/strain/3D); Future  3. Thrombocytopenia, unspecified (HCC)  -     Comprehensive Metabolic Panel; Future  -     CBC with Auto Differential; Future  -     TSH; Future  -     T4, Free; Future  -     Echo (TTE) complete (PRN contrast/bubble/strain/3D); Future  4. Atrial fibrillation, unspecified type (HCC)  Comments:  a-fib without RVR. labs today. echo to be completed. adding metoprolol to regimen. will discontinue amlodipine. follow up 2-4 weeks.  Orders:  -     Comprehensive Metabolic Panel; Future  -     CBC with Auto Differential; Future  -     TSH; Future  -     T4, Free; Future  -     Echo (TTE) complete (PRN contrast/bubble/strain/3D); Future  -     metoprolol succinate (TOPROL XL) 25 MG extended release tablet; Take 1 tablet by mouth daily, Disp-30 tablet, R-0Normal  5. Abnormal

## 2024-11-19 LAB
ALBUMIN SERPL BCG-MCNC: 3.7 G/DL (ref 3.5–5.1)
ALP SERPL-CCNC: 217 U/L (ref 38–126)
ALT SERPL W/O P-5'-P-CCNC: 16 U/L (ref 11–66)
ANION GAP SERPL CALC-SCNC: 9 MEQ/L (ref 8–16)
AST SERPL-CCNC: 20 U/L (ref 5–40)
BILIRUB SERPL-MCNC: 0.5 MG/DL (ref 0.3–1.2)
BUN SERPL-MCNC: 26 MG/DL (ref 7–22)
CALCIUM SERPL-MCNC: 8.5 MG/DL (ref 8.5–10.5)
CHLORIDE SERPL-SCNC: 99 MEQ/L (ref 98–111)
CO2 SERPL-SCNC: 23 MEQ/L (ref 23–33)
CREAT SERPL-MCNC: 1.7 MG/DL (ref 0.4–1.2)
GFR SERPL CREATININE-BSD FRML MDRD: 38 ML/MIN/1.73M2
GLUCOSE SERPL-MCNC: 97 MG/DL (ref 70–108)
POTASSIUM SERPL-SCNC: 4.7 MEQ/L (ref 3.5–5.2)
PROT SERPL-MCNC: 6.7 G/DL (ref 6.1–8)
SODIUM SERPL-SCNC: 131 MEQ/L (ref 135–145)
T4 FREE SERPL-MCNC: 1.46 NG/DL (ref 0.93–1.68)
TSH SERPL DL<=0.005 MIU/L-ACNC: 2.38 UIU/ML (ref 0.4–4.2)

## 2024-11-27 ENCOUNTER — HOSPITAL ENCOUNTER (OUTPATIENT)
Age: 88
Discharge: HOME | End: 2024-11-29
Payer: MEDICARE

## 2024-11-27 DIAGNOSIS — I48.91 ATRIAL FIBRILLATION, UNSPECIFIED TYPE (HCC): ICD-10-CM

## 2024-11-27 DIAGNOSIS — I10 ESSENTIAL HYPERTENSION: ICD-10-CM

## 2024-11-27 DIAGNOSIS — R94.31 ABNORMAL ELECTROCARDIOGRAM (ECG) (EKG): ICD-10-CM

## 2024-11-27 DIAGNOSIS — D69.6 THROMBOCYTOPENIA, UNSPECIFIED (HCC): ICD-10-CM

## 2024-11-27 DIAGNOSIS — I49.9 IRREGULAR HEARTBEAT: ICD-10-CM

## 2024-11-27 PROCEDURE — 93306 TTE W/DOPPLER COMPLETE: CPT

## 2024-11-28 LAB
ECHO AO ASC DIAM: 3.6 CM
ECHO AR MAX VEL PISA: 4.3 M/S
ECHO AV CUSP MM: 2 CM
ECHO AV MEAN GRADIENT: 5 MMHG
ECHO AV MEAN VELOCITY: 1 M/S
ECHO AV PEAK GRADIENT: 10 MMHG
ECHO AV PEAK VELOCITY: 1.6 M/S
ECHO AV REGURGITANT PHT: 368 MS
ECHO AV VELOCITY RATIO: 0.75
ECHO AV VTI: 28.2 CM
ECHO EST RA PRESSURE: 15 MMHG
ECHO LA AREA 2C: 18 CM2
ECHO LA AREA 4C: 16.9 CM2
ECHO LA DIAMETER: 3.3 CM
ECHO LA MAJOR AXIS: 5.1 CM
ECHO LA MINOR AXIS: 5.2 CM
ECHO LA VOL BP: 47 ML (ref 18–58)
ECHO LA VOL MOD A2C: 51 ML (ref 18–58)
ECHO LA VOL MOD A4C: 43 ML (ref 18–58)
ECHO LV E' LATERAL VELOCITY: 6.1 CM/S
ECHO LV E' SEPTAL VELOCITY: 6.6 CM/S
ECHO LV EDV A2C: 104 ML
ECHO LV EDV A4C: 87 ML
ECHO LV EJECTION FRACTION A2C: 65 %
ECHO LV EJECTION FRACTION A4C: 63 %
ECHO LV EJECTION FRACTION BIPLANE: 66 % (ref 55–100)
ECHO LV ESV A2C: 36 ML
ECHO LV ESV A4C: 32 ML
ECHO LV FRACTIONAL SHORTENING: 38 % (ref 28–44)
ECHO LV INTERNAL DIMENSION DIASTOLIC: 3.7 CM (ref 4.2–5.9)
ECHO LV INTERNAL DIMENSION SYSTOLIC: 2.3 CM
ECHO LV ISOVOLUMETRIC RELAXATION TIME (IVRT): 42 MS
ECHO LV IVSD: 1 CM (ref 0.6–1)
ECHO LV MASS 2D: 104.6 G (ref 88–224)
ECHO LV POSTERIOR WALL DIASTOLIC: 0.9 CM (ref 0.6–1)
ECHO LV RELATIVE WALL THICKNESS RATIO: 0.49
ECHO LVOT AV VTI INDEX: 0.81
ECHO LVOT MEAN GRADIENT: 3 MMHG
ECHO LVOT PEAK GRADIENT: 5 MMHG
ECHO LVOT PEAK VELOCITY: 1.2 M/S
ECHO LVOT VTI: 22.9 CM
ECHO MV A VELOCITY: 0.56 M/S
ECHO MV E DECELERATION TIME (DT): 186 MS
ECHO MV E VELOCITY: 1.21 M/S
ECHO MV E/A RATIO: 2.16
ECHO MV E/E' LATERAL: 19.84
ECHO MV E/E' RATIO (AVERAGED): 19.08
ECHO MV E/E' SEPTAL: 18.33
ECHO MV REGURGITANT PEAK GRADIENT: 85 MMHG
ECHO MV REGURGITANT PEAK VELOCITY: 4.6 M/S
ECHO RIGHT VENTRICULAR SYSTOLIC PRESSURE (RVSP): 56 MMHG
ECHO RV INTERNAL DIMENSION: 2.5 CM
ECHO RV TAPSE: 2.6 CM (ref 1.7–?)
ECHO TV E WAVE: 0.7 M/S
ECHO TV REGURGITANT MAX VELOCITY: 3.2 M/S
ECHO TV REGURGITANT PEAK GRADIENT: 41 MMHG

## 2024-12-03 DIAGNOSIS — I10 ESSENTIAL HYPERTENSION: ICD-10-CM

## 2024-12-03 DIAGNOSIS — I48.91 ATRIAL FIBRILLATION, UNSPECIFIED TYPE (HCC): ICD-10-CM

## 2024-12-03 RX ORDER — METOPROLOL SUCCINATE 25 MG/1
25 TABLET, EXTENDED RELEASE ORAL DAILY
Qty: 30 TABLET | Refills: 0 | Status: SHIPPED | OUTPATIENT
Start: 2024-12-03

## 2024-12-03 NOTE — TELEPHONE ENCOUNTER
Tiago Tian called requesting a refill on the following medications:  Requested Prescriptions     Pending Prescriptions Disp Refills    metoprolol succinate (TOPROL XL) 25 MG extended release tablet 30 tablet 0     Sig: Take 1 tablet by mouth daily       Date of last visit: 11/18/2024  Date of next visit (if applicable):Visit date not found  Date of last refill: 11/18/24 30-day   Pharmacy Name: Romana Steiner MA

## 2024-12-09 ENCOUNTER — OFFICE VISIT (OUTPATIENT)
Dept: FAMILY MEDICINE CLINIC | Age: 88
End: 2024-12-09
Payer: MEDICARE

## 2024-12-09 VITALS
RESPIRATION RATE: 18 BRPM | BODY MASS INDEX: 24.41 KG/M2 | SYSTOLIC BLOOD PRESSURE: 120 MMHG | HEART RATE: 90 BPM | WEIGHT: 180 LBS | DIASTOLIC BLOOD PRESSURE: 72 MMHG | OXYGEN SATURATION: 98 %

## 2024-12-09 DIAGNOSIS — I48.91 ATRIAL FIBRILLATION, UNSPECIFIED TYPE (HCC): ICD-10-CM

## 2024-12-09 DIAGNOSIS — I10 ESSENTIAL HYPERTENSION: ICD-10-CM

## 2024-12-09 DIAGNOSIS — I48.0 PAROXYSMAL ATRIAL FIBRILLATION (HCC): Primary | ICD-10-CM

## 2024-12-09 DIAGNOSIS — L98.9 SKIN LESION OF FACE: ICD-10-CM

## 2024-12-09 PROCEDURE — 1159F MED LIST DOCD IN RCRD: CPT | Performed by: FAMILY MEDICINE

## 2024-12-09 PROCEDURE — 1160F RVW MEDS BY RX/DR IN RCRD: CPT | Performed by: FAMILY MEDICINE

## 2024-12-09 PROCEDURE — 1036F TOBACCO NON-USER: CPT | Performed by: FAMILY MEDICINE

## 2024-12-09 PROCEDURE — 1123F ACP DISCUSS/DSCN MKR DOCD: CPT | Performed by: FAMILY MEDICINE

## 2024-12-09 PROCEDURE — G8427 DOCREV CUR MEDS BY ELIG CLIN: HCPCS | Performed by: FAMILY MEDICINE

## 2024-12-09 PROCEDURE — G8420 CALC BMI NORM PARAMETERS: HCPCS | Performed by: FAMILY MEDICINE

## 2024-12-09 PROCEDURE — 99214 OFFICE O/P EST MOD 30 MIN: CPT | Performed by: FAMILY MEDICINE

## 2024-12-09 PROCEDURE — G8484 FLU IMMUNIZE NO ADMIN: HCPCS | Performed by: FAMILY MEDICINE

## 2024-12-09 RX ORDER — METOPROLOL SUCCINATE 50 MG/1
50 TABLET, EXTENDED RELEASE ORAL DAILY
Qty: 90 TABLET | Refills: 3 | Status: SHIPPED | OUTPATIENT
Start: 2024-12-09

## 2024-12-09 RX ORDER — METOPROLOL SUCCINATE 25 MG/1
25 TABLET, EXTENDED RELEASE ORAL DAILY
Qty: 30 TABLET | Refills: 11 | OUTPATIENT
Start: 2024-12-09

## 2024-12-09 ASSESSMENT — ENCOUNTER SYMPTOMS: SHORTNESS OF BREATH: 0

## 2024-12-09 NOTE — PROGRESS NOTES
[facility] PROFESSIONAL [facility] MEDICINE  [address]  [facility] [address]  Dept: [phone]  Dept Fax: [phone]  Loc: [phone]  PROGRESS NOTE      Visit Date: 12/9/2024    Tiago Tian is a 89 y.o. male who presents today for:  Chief Complaint   Patient presents with    Hypertension    Atrial Fibrillation       Chief complaint:  A. fib    Subjective:  Hypertension  Pertinent negatives include no chest pain, palpitations or shortness of breath.   Atrial Fibrillation  Symptoms are negative for chest pain, dizziness, palpitations and shortness of breath.       A. Fib.  New onset a few weeks ago.  Off norvasc.  On metoprolol. Home SBP 130s.  Tiredness.  No previous stroke.     Not very active    No hx of skin cancer.  Multiple skin lesions on face.     Wife and son are present    Review of Systems   Constitutional:  Negative for chills and fever.   Respiratory:  Negative for shortness of breath.    Cardiovascular:  Negative for chest pain and palpitations.   Neurological:  Negative for dizziness, syncope, speech difficulty and light-headedness.     Patient Active Problem List   Diagnosis    Hyperlipidemia    Osteoarthritis    Primary osteoarthritis of left hip    HTN (hypertension)    Cholecystitis    Stage 3b chronic kidney disease (HCC)    Elevated alkaline phosphatase level    Enlarged prostate    Splenomegaly    Urinary retention    Mild aortic regurgitation    Mild mitral regurgitation    Mild tricuspid regurgitation    S/P laparoscopic cholecystectomy    S/P right inguinal hernia repair    Paget disease of bone    Thrombocytopenia, unspecified (HCC)     Past Medical History:   Diagnosis Date    BPH with elevated PSA     Elevated PSA     Hyperlipidemia     Hypertension     Osteoarthritis     PAC (premature atrial contraction)     Paget disease of bone     S/P laparoscopic cholecystectomy 07/18/2018    S/P right inguinal hernia repair 07/18/2018    Urinary

## 2024-12-09 NOTE — TELEPHONE ENCOUNTER
Tiago Tian called requesting a refill on the following medications:  Requested Prescriptions     Refused Prescriptions Disp Refills    metoprolol succinate (TOPROL XL) 25 MG extended release tablet [Pharmacy Med Name: Metoprolol Succinate ER Oral Tablet Extended Release 24 Hour 25 MG] 30 tablet 11     Sig: TAKE 1 TABLET EVERY DAY       Date of last visit: 11/18/2024  Date of next visit (if applicable):Visit date not found    Already responded on 12/3/24      Thanks,  Angie Villalta LPN

## 2024-12-23 ENCOUNTER — OFFICE VISIT (OUTPATIENT)
Dept: UROLOGY | Age: 88
End: 2024-12-23
Payer: MEDICARE

## 2024-12-23 DIAGNOSIS — R33.9 URINARY RETENTION: Primary | ICD-10-CM

## 2024-12-23 PROCEDURE — 51702 INSERT TEMP BLADDER CATH: CPT | Performed by: UROLOGY

## 2024-12-23 NOTE — PROGRESS NOTES
I have personally verified, reviewed, released, signed, authenticated, authorized, confirmed,finalized, and approved the actions of the CMA.

## 2024-12-23 NOTE — PROGRESS NOTES
Patient has given me verbal consent to perform catheter change Yes    DIAGNOSIS/INDICATION:  Urinary retention    Does patient have latex allergy?No  Does patient have shellfish or betadine allergy? No      Following Dr John plan of care. 18Fr Catheter changed without difficulty.    Once balloon was deflated, removed catheter without difficulty. Cleansed urethral opening with betadine swab. 18Fr regular jane was inserted using sterile water-soluble lubricant without difficulty. Catheter was flushed with 35cc water ensuring return and inflated balloon with 10 ml of water. Jane Catheter was hooked up to leg bag.    Foreskin reduced back down? N/A    Patient instructed on how to drain catheter bags.    If patient was given a leg bag, patient was instructed to keep bag above the knee to prevent pulling on catheter. Pt notified if catheter is pulled on may cause pain and bleeding.     Patient was also instructed on how to switch from leg bag to overnight bag.          Supplies were provided by office

## 2025-02-03 ENCOUNTER — NURSE ONLY (OUTPATIENT)
Dept: UROLOGY | Age: 89
End: 2025-02-03
Payer: MEDICARE

## 2025-02-03 DIAGNOSIS — R33.9 URINARY RETENTION: Primary | ICD-10-CM

## 2025-02-03 PROCEDURE — 51702 INSERT TEMP BLADDER CATH: CPT

## 2025-02-03 NOTE — PROGRESS NOTES
Patient has given me verbal consent to perform catheter change Yes    DIAGNOSIS/INDICATION:  Urinary retention    Does patient have latex allergy?No  Does patient have shellfish or betadine allergy? No      Following Sandor SERVIN plan of care. 18Fr Catheter changed without difficulty.    Once balloon was deflated, removed catheter without difficulty. Cleansed urethral opening with betadine swab. 18Fr regular jane was inserted using sterile water-soluble lubricant without difficulty. Catheter was flushed with 35cc water ensuring return and inflated balloon with 10 ml of water. Jane Catheter was hooked up to leg bag.    Foreskin reduced back down? N/A    Patient instructed on how to drain catheter bags.    If patient was given a leg bag, patient was instructed to keep bag above the knee to prevent pulling on catheter. Pt notified if catheter is pulled on may cause pain and bleeding.     Patient was also instructed on how to switch from leg bag to overnight bag.          Supplies were provided by office

## 2025-02-17 ENCOUNTER — HOSPITAL ENCOUNTER (INPATIENT)
Age: 89
LOS: 2 days | Discharge: HOSPICE | DRG: 308 | End: 2025-02-20
Attending: EMERGENCY MEDICINE | Admitting: INTERNAL MEDICINE
Payer: MEDICARE

## 2025-02-17 ENCOUNTER — APPOINTMENT (OUTPATIENT)
Dept: GENERAL RADIOLOGY | Age: 89
DRG: 308 | End: 2025-02-17
Payer: MEDICARE

## 2025-02-17 ENCOUNTER — TELEPHONE (OUTPATIENT)
Dept: FAMILY MEDICINE CLINIC | Age: 89
End: 2025-02-17

## 2025-02-17 ENCOUNTER — APPOINTMENT (OUTPATIENT)
Dept: ULTRASOUND IMAGING | Age: 89
DRG: 308 | End: 2025-02-17
Payer: MEDICARE

## 2025-02-17 ENCOUNTER — APPOINTMENT (OUTPATIENT)
Dept: CT IMAGING | Age: 89
DRG: 308 | End: 2025-02-17
Payer: MEDICARE

## 2025-02-17 ENCOUNTER — APPOINTMENT (OUTPATIENT)
Dept: INTERVENTIONAL RADIOLOGY/VASCULAR | Age: 89
DRG: 308 | End: 2025-02-17
Attending: STUDENT IN AN ORGANIZED HEALTH CARE EDUCATION/TRAINING PROGRAM
Payer: MEDICARE

## 2025-02-17 DIAGNOSIS — J90 PLEURAL EFFUSION, LEFT: ICD-10-CM

## 2025-02-17 DIAGNOSIS — E78.5 HYPERLIPIDEMIA, UNSPECIFIED HYPERLIPIDEMIA TYPE: ICD-10-CM

## 2025-02-17 DIAGNOSIS — M79.89 LEG SWELLING: ICD-10-CM

## 2025-02-17 DIAGNOSIS — I50.9 ACUTE CONGESTIVE HEART FAILURE, UNSPECIFIED HEART FAILURE TYPE (HCC): Primary | ICD-10-CM

## 2025-02-17 PROBLEM — Z98.890 S/P RIGHT INGUINAL HERNIA REPAIR: Status: RESOLVED | Noted: 2018-07-18 | Resolved: 2025-02-17

## 2025-02-17 PROBLEM — Z90.49 S/P LAPAROSCOPIC CHOLECYSTECTOMY: Status: RESOLVED | Noted: 2018-07-18 | Resolved: 2025-02-17

## 2025-02-17 PROBLEM — Z87.19 S/P RIGHT INGUINAL HERNIA REPAIR: Status: RESOLVED | Noted: 2018-07-18 | Resolved: 2025-02-17

## 2025-02-17 LAB
ALBUMIN FLD-MCNC: 2.6 GM/DL
ALBUMIN SERPL BCG-MCNC: 3.5 G/DL (ref 3.5–5.1)
ANION GAP SERPL CALC-SCNC: 14 MEQ/L (ref 8–16)
ANION GAP SERPL CALC-SCNC: 18 MEQ/L (ref 8–16)
BASOPHILS ABSOLUTE: 0 THOU/MM3 (ref 0–0.1)
BASOPHILS NFR BLD AUTO: 0.2 %
BUN SERPL-MCNC: 29 MG/DL (ref 7–22)
BUN SERPL-MCNC: 32 MG/DL (ref 7–22)
CALCIUM SERPL-MCNC: 8.1 MG/DL (ref 8.5–10.5)
CALCIUM SERPL-MCNC: 8.4 MG/DL (ref 8.5–10.5)
CHLORIDE SERPL-SCNC: 101 MEQ/L (ref 98–111)
CHLORIDE SERPL-SCNC: 98 MEQ/L (ref 98–111)
CO2 SERPL-SCNC: 21 MEQ/L (ref 23–33)
CO2 SERPL-SCNC: 22 MEQ/L (ref 23–33)
CREAT SERPL-MCNC: 1.7 MG/DL (ref 0.4–1.2)
CREAT SERPL-MCNC: 1.9 MG/DL (ref 0.4–1.2)
DEPRECATED MEAN GLUCOSE BLD GHB EST-ACNC: 87 MG/DL (ref 70–126)
DEPRECATED RDW RBC AUTO: 48.5 FL (ref 35–45)
DEPRECATED RDW RBC AUTO: 49.1 FL (ref 35–45)
EKG ATRIAL RATE: 104 BPM
EKG P AXIS: 84 DEGREES
EKG P-R INTERVAL: 80 MS
EKG Q-T INTERVAL: 348 MS
EKG QRS DURATION: 76 MS
EKG QTC CALCULATION (BAZETT): 457 MS
EKG R AXIS: -22 DEGREES
EKG T AXIS: 33 DEGREES
EKG VENTRICULAR RATE: 104 BPM
EOSINOPHIL NFR BLD AUTO: 0.6 %
EOSINOPHILS ABSOLUTE: 0 THOU/MM3 (ref 0–0.4)
ERYTHROCYTE [DISTWIDTH] IN BLOOD BY AUTOMATED COUNT: 14.4 % (ref 11.5–14.5)
ERYTHROCYTE [DISTWIDTH] IN BLOOD BY AUTOMATED COUNT: 14.6 % (ref 11.5–14.5)
FERRITIN SERPL IA-MCNC: 171 NG/ML (ref 22–322)
FLUAV RNA RESP QL NAA+PROBE: NOT DETECTED
FLUBV RNA RESP QL NAA+PROBE: NOT DETECTED
GFR SERPL CREATININE-BSD FRML MDRD: 33 ML/MIN/1.73M2
GFR SERPL CREATININE-BSD FRML MDRD: 37 ML/MIN/1.73M2
GLUCOSE FLD-MCNC: 81 MG/DL
GLUCOSE SERPL-MCNC: 124 MG/DL (ref 70–108)
GLUCOSE SERPL-MCNC: 136 MG/DL (ref 70–108)
HBA1C MFR BLD HPLC: 4.9 % (ref 4.4–6.4)
HCT VFR BLD AUTO: 33.6 % (ref 42–52)
HCT VFR BLD AUTO: 35.1 % (ref 42–52)
HGB BLD-MCNC: 10.7 GM/DL (ref 14–18)
HGB BLD-MCNC: 11.2 GM/DL (ref 14–18)
IMM GRANULOCYTES # BLD AUTO: 0.01 THOU/MM3 (ref 0–0.07)
IMM GRANULOCYTES NFR BLD AUTO: 0.2 %
IRON SERPL-MCNC: 25 UG/DL (ref 65–195)
LDH FLD L TO P-CCNC: 487 U/L
LDH SERPL L TO P-CCNC: 173 U/L (ref 100–190)
LYMPHOCYTES ABSOLUTE: 1.8 THOU/MM3 (ref 1–4.8)
LYMPHOCYTES NFR BLD AUTO: 32.9 %
MAGNESIUM SERPL-MCNC: 2 MG/DL (ref 1.6–2.4)
MAGNESIUM SERPL-MCNC: 2 MG/DL (ref 1.6–2.4)
MCH RBC QN AUTO: 29 PG (ref 26–33)
MCH RBC QN AUTO: 29.7 PG (ref 26–33)
MCHC RBC AUTO-ENTMCNC: 31.8 GM/DL (ref 32.2–35.5)
MCHC RBC AUTO-ENTMCNC: 31.9 GM/DL (ref 32.2–35.5)
MCV RBC AUTO: 90.9 FL (ref 80–94)
MCV RBC AUTO: 93.3 FL (ref 80–94)
MONOCYTES ABSOLUTE: 0.3 THOU/MM3 (ref 0.4–1.3)
MONOCYTES NFR BLD AUTO: 6.2 %
NEUTROPHILS ABSOLUTE: 3.2 THOU/MM3 (ref 1.8–7.7)
NEUTROPHILS NFR BLD AUTO: 59.9 %
NRBC BLD AUTO-RTO: 0 /100 WBC
NT-PROBNP SERPL IA-MCNC: ABNORMAL PG/ML (ref 0–449)
OSMOLALITY SERPL CALC.SUM OF ELEC: 283.6 MOSMOL/KG (ref 275–300)
PH BIFL: 7.64 [PH]
PLATELET # BLD AUTO: 107 THOU/MM3 (ref 130–400)
PLATELET # BLD AUTO: 129 THOU/MM3 (ref 130–400)
PMV BLD AUTO: 8.7 FL (ref 9.4–12.4)
PMV BLD AUTO: 9.3 FL (ref 9.4–12.4)
POTASSIUM SERPL-SCNC: 4.2 MEQ/L (ref 3.5–5.2)
POTASSIUM SERPL-SCNC: 4.7 MEQ/L (ref 3.5–5.2)
PROCALCITONIN SERPL IA-MCNC: 0.07 NG/ML (ref 0.01–0.09)
PROT FLD-MCNC: 4 GM/DL
PROT SERPL-MCNC: 6.5 G/DL (ref 6.1–8)
RBC # BLD AUTO: 3.6 MILL/MM3 (ref 4.7–6.1)
RBC # BLD AUTO: 3.86 MILL/MM3 (ref 4.7–6.1)
SARS-COV-2 RNA RESP QL NAA+PROBE: NOT DETECTED
SODIUM SERPL-SCNC: 136 MEQ/L (ref 135–145)
SODIUM SERPL-SCNC: 138 MEQ/L (ref 135–145)
T4 FREE SERPL-MCNC: 1.41 NG/DL (ref 0.92–1.68)
TIBC SERPL-MCNC: 187 UG/DL (ref 171–450)
TROPONIN, HIGH SENSITIVITY: 48 NG/L (ref 0–12)
TROPONIN, HIGH SENSITIVITY: 51 NG/L (ref 0–12)
TROPONIN, HIGH SENSITIVITY: 52 NG/L (ref 0–12)
TROPONIN, HIGH SENSITIVITY: 54 NG/L (ref 0–12)
TSH SERPL DL<=0.005 MIU/L-ACNC: 2.77 UIU/ML (ref 0.4–4.2)
TSH SERPL DL<=0.005 MIU/L-ACNC: 3.45 UIU/ML (ref 0.4–4.2)
WBC # BLD AUTO: 5.4 THOU/MM3 (ref 4.8–10.8)
WBC # BLD AUTO: 8.8 THOU/MM3 (ref 4.8–10.8)

## 2025-02-17 PROCEDURE — 80048 BASIC METABOLIC PNL TOTAL CA: CPT

## 2025-02-17 PROCEDURE — 82945 GLUCOSE OTHER FLUID: CPT

## 2025-02-17 PROCEDURE — 0W9B3ZZ DRAINAGE OF LEFT PLEURAL CAVITY, PERCUTANEOUS APPROACH: ICD-10-PCS | Performed by: RADIOLOGY

## 2025-02-17 PROCEDURE — 2500000003 HC RX 250 WO HCPCS

## 2025-02-17 PROCEDURE — 84157 ASSAY OF PROTEIN OTHER: CPT

## 2025-02-17 PROCEDURE — 71045 X-RAY EXAM CHEST 1 VIEW: CPT

## 2025-02-17 PROCEDURE — 84484 ASSAY OF TROPONIN QUANT: CPT

## 2025-02-17 PROCEDURE — G0378 HOSPITAL OBSERVATION PER HR: HCPCS

## 2025-02-17 PROCEDURE — 88305 TISSUE EXAM BY PATHOLOGIST: CPT

## 2025-02-17 PROCEDURE — 82040 ASSAY OF SERUM ALBUMIN: CPT

## 2025-02-17 PROCEDURE — 6360000002 HC RX W HCPCS

## 2025-02-17 PROCEDURE — 84443 ASSAY THYROID STIM HORMONE: CPT

## 2025-02-17 PROCEDURE — 87070 CULTURE OTHR SPECIMN AEROBIC: CPT

## 2025-02-17 PROCEDURE — 89051 BODY FLUID CELL COUNT: CPT

## 2025-02-17 PROCEDURE — 84145 PROCALCITONIN (PCT): CPT

## 2025-02-17 PROCEDURE — 83735 ASSAY OF MAGNESIUM: CPT

## 2025-02-17 PROCEDURE — 84155 ASSAY OF PROTEIN SERUM: CPT

## 2025-02-17 PROCEDURE — 83986 ASSAY PH BODY FLUID NOS: CPT

## 2025-02-17 PROCEDURE — 6370000000 HC RX 637 (ALT 250 FOR IP)

## 2025-02-17 PROCEDURE — 85025 COMPLETE CBC W/AUTO DIFF WBC: CPT

## 2025-02-17 PROCEDURE — 99223 1ST HOSP IP/OBS HIGH 75: CPT

## 2025-02-17 PROCEDURE — 6370000000 HC RX 637 (ALT 250 FOR IP): Performed by: NURSE PRACTITIONER

## 2025-02-17 PROCEDURE — 99285 EMERGENCY DEPT VISIT HI MDM: CPT

## 2025-02-17 PROCEDURE — 83540 ASSAY OF IRON: CPT

## 2025-02-17 PROCEDURE — 82728 ASSAY OF FERRITIN: CPT

## 2025-02-17 PROCEDURE — 87075 CULTR BACTERIA EXCEPT BLOOD: CPT

## 2025-02-17 PROCEDURE — 83615 LACTATE (LD) (LDH) ENZYME: CPT

## 2025-02-17 PROCEDURE — 83550 IRON BINDING TEST: CPT

## 2025-02-17 PROCEDURE — 87636 SARSCOV2 & INF A&B AMP PRB: CPT

## 2025-02-17 PROCEDURE — 88112 CYTOPATH CELL ENHANCE TECH: CPT

## 2025-02-17 PROCEDURE — 71250 CT THORAX DX C-: CPT

## 2025-02-17 PROCEDURE — 93005 ELECTROCARDIOGRAM TRACING: CPT | Performed by: STUDENT IN AN ORGANIZED HEALTH CARE EDUCATION/TRAINING PROGRAM

## 2025-02-17 PROCEDURE — 93970 EXTREMITY STUDY: CPT

## 2025-02-17 PROCEDURE — 32555 ASPIRATE PLEURA W/ IMAGING: CPT

## 2025-02-17 PROCEDURE — 81001 URINALYSIS AUTO W/SCOPE: CPT

## 2025-02-17 PROCEDURE — 87205 SMEAR GRAM STAIN: CPT

## 2025-02-17 PROCEDURE — 88108 CYTOPATH CONCENTRATE TECH: CPT

## 2025-02-17 PROCEDURE — 82042 OTHER SOURCE ALBUMIN QUAN EA: CPT

## 2025-02-17 PROCEDURE — 87086 URINE CULTURE/COLONY COUNT: CPT

## 2025-02-17 PROCEDURE — 85027 COMPLETE CBC AUTOMATED: CPT

## 2025-02-17 PROCEDURE — 93005 ELECTROCARDIOGRAM TRACING: CPT | Performed by: EMERGENCY MEDICINE

## 2025-02-17 PROCEDURE — 2580000003 HC RX 258

## 2025-02-17 PROCEDURE — 93010 ELECTROCARDIOGRAM REPORT: CPT | Performed by: INTERNAL MEDICINE

## 2025-02-17 PROCEDURE — 84439 ASSAY OF FREE THYROXINE: CPT

## 2025-02-17 PROCEDURE — 87641 MR-STAPH DNA AMP PROBE: CPT

## 2025-02-17 PROCEDURE — 83036 HEMOGLOBIN GLYCOSYLATED A1C: CPT

## 2025-02-17 PROCEDURE — 36415 COLL VENOUS BLD VENIPUNCTURE: CPT

## 2025-02-17 PROCEDURE — 83880 ASSAY OF NATRIURETIC PEPTIDE: CPT

## 2025-02-17 RX ORDER — ONDANSETRON 2 MG/ML
4 INJECTION INTRAMUSCULAR; INTRAVENOUS EVERY 6 HOURS PRN
Status: DISCONTINUED | OUTPATIENT
Start: 2025-02-17 | End: 2025-02-20 | Stop reason: HOSPADM

## 2025-02-17 RX ORDER — METOPROLOL SUCCINATE 50 MG/1
50 TABLET, EXTENDED RELEASE ORAL DAILY
Status: DISCONTINUED | OUTPATIENT
Start: 2025-02-17 | End: 2025-02-19

## 2025-02-17 RX ORDER — SODIUM CHLORIDE 0.9 % (FLUSH) 0.9 %
5-40 SYRINGE (ML) INJECTION PRN
Status: DISCONTINUED | OUTPATIENT
Start: 2025-02-17 | End: 2025-02-20 | Stop reason: HOSPADM

## 2025-02-17 RX ORDER — SODIUM CHLORIDE 9 MG/ML
INJECTION, SOLUTION INTRAVENOUS PRN
Status: DISCONTINUED | OUTPATIENT
Start: 2025-02-17 | End: 2025-02-20 | Stop reason: HOSPADM

## 2025-02-17 RX ORDER — DILTIAZEM HYDROCHLORIDE 5 MG/ML
5 INJECTION INTRAVENOUS ONCE
Status: COMPLETED | OUTPATIENT
Start: 2025-02-17 | End: 2025-02-17

## 2025-02-17 RX ORDER — POTASSIUM CHLORIDE 1500 MG/1
40 TABLET, EXTENDED RELEASE ORAL PRN
Status: DISCONTINUED | OUTPATIENT
Start: 2025-02-17 | End: 2025-02-20 | Stop reason: HOSPADM

## 2025-02-17 RX ORDER — ADENOSINE 3 MG/ML
INJECTION, SOLUTION INTRAVENOUS
Status: COMPLETED
Start: 2025-02-17 | End: 2025-02-17

## 2025-02-17 RX ORDER — FUROSEMIDE 10 MG/ML
20 INJECTION INTRAMUSCULAR; INTRAVENOUS 2 TIMES DAILY
Status: DISCONTINUED | OUTPATIENT
Start: 2025-02-17 | End: 2025-02-20

## 2025-02-17 RX ORDER — MAGNESIUM SULFATE IN WATER 40 MG/ML
2000 INJECTION, SOLUTION INTRAVENOUS PRN
Status: DISCONTINUED | OUTPATIENT
Start: 2025-02-17 | End: 2025-02-20 | Stop reason: HOSPADM

## 2025-02-17 RX ORDER — ACETAMINOPHEN 650 MG/1
650 SUPPOSITORY RECTAL EVERY 6 HOURS PRN
Status: DISCONTINUED | OUTPATIENT
Start: 2025-02-17 | End: 2025-02-20 | Stop reason: HOSPADM

## 2025-02-17 RX ORDER — LIDOCAINE HYDROCHLORIDE 20 MG/ML
JELLY TOPICAL ONCE
Status: COMPLETED | OUTPATIENT
Start: 2025-02-17 | End: 2025-02-17

## 2025-02-17 RX ORDER — POLYETHYLENE GLYCOL 3350 17 G/17G
17 POWDER, FOR SOLUTION ORAL DAILY PRN
Status: DISCONTINUED | OUTPATIENT
Start: 2025-02-17 | End: 2025-02-20 | Stop reason: HOSPADM

## 2025-02-17 RX ORDER — METOPROLOL TARTRATE 1 MG/ML
INJECTION, SOLUTION INTRAVENOUS
Status: COMPLETED
Start: 2025-02-17 | End: 2025-02-17

## 2025-02-17 RX ORDER — AZITHROMYCIN 250 MG/1
500 TABLET, FILM COATED ORAL DAILY
Status: COMPLETED | OUTPATIENT
Start: 2025-02-17 | End: 2025-02-19

## 2025-02-17 RX ORDER — POTASSIUM CHLORIDE 7.45 MG/ML
10 INJECTION INTRAVENOUS PRN
Status: DISCONTINUED | OUTPATIENT
Start: 2025-02-17 | End: 2025-02-20 | Stop reason: HOSPADM

## 2025-02-17 RX ORDER — LIDOCAINE HYDROCHLORIDE 20 MG/ML
JELLY TOPICAL PRN
Status: DISCONTINUED | OUTPATIENT
Start: 2025-02-17 | End: 2025-02-20 | Stop reason: HOSPADM

## 2025-02-17 RX ORDER — ONDANSETRON 4 MG/1
4 TABLET, ORALLY DISINTEGRATING ORAL EVERY 8 HOURS PRN
Status: DISCONTINUED | OUTPATIENT
Start: 2025-02-17 | End: 2025-02-20 | Stop reason: HOSPADM

## 2025-02-17 RX ORDER — ACETAMINOPHEN 325 MG/1
650 TABLET ORAL EVERY 6 HOURS PRN
Status: DISCONTINUED | OUTPATIENT
Start: 2025-02-17 | End: 2025-02-20 | Stop reason: HOSPADM

## 2025-02-17 RX ORDER — SODIUM CHLORIDE 0.9 % (FLUSH) 0.9 %
5-40 SYRINGE (ML) INJECTION EVERY 12 HOURS SCHEDULED
Status: DISCONTINUED | OUTPATIENT
Start: 2025-02-17 | End: 2025-02-20 | Stop reason: HOSPADM

## 2025-02-17 RX ORDER — ENOXAPARIN SODIUM 100 MG/ML
40 INJECTION SUBCUTANEOUS DAILY
Status: DISCONTINUED | OUTPATIENT
Start: 2025-02-17 | End: 2025-02-18

## 2025-02-17 RX ORDER — METOPROLOL TARTRATE 1 MG/ML
INJECTION, SOLUTION INTRAVENOUS
Status: DISCONTINUED
Start: 2025-02-17 | End: 2025-02-17 | Stop reason: WASHOUT

## 2025-02-17 RX ADMIN — DILTIAZEM HYDROCHLORIDE 5 MG/HR: 5 INJECTION, SOLUTION INTRAVENOUS at 22:23

## 2025-02-17 RX ADMIN — ACETAMINOPHEN 650 MG: 325 TABLET ORAL at 23:05

## 2025-02-17 RX ADMIN — LIDOCAINE HYDROCHLORIDE: 20 JELLY TOPICAL at 20:45

## 2025-02-17 RX ADMIN — METOPROLOL SUCCINATE 50 MG: 50 TABLET, FILM COATED, EXTENDED RELEASE ORAL at 18:49

## 2025-02-17 RX ADMIN — ENOXAPARIN SODIUM 40 MG: 100 INJECTION SUBCUTANEOUS at 18:49

## 2025-02-17 RX ADMIN — FUROSEMIDE 20 MG: 10 INJECTION, SOLUTION INTRAMUSCULAR; INTRAVENOUS at 18:49

## 2025-02-17 RX ADMIN — ADENOSINE: 3 INJECTION, SOLUTION INTRAVENOUS at 21:40

## 2025-02-17 RX ADMIN — DILTIAZEM HYDROCHLORIDE 5 MG: 5 INJECTION, SOLUTION INTRAVENOUS at 22:22

## 2025-02-17 RX ADMIN — WATER 1000 MG: 1 INJECTION INTRAMUSCULAR; INTRAVENOUS; SUBCUTANEOUS at 21:18

## 2025-02-17 RX ADMIN — ADENOSINE: 3 INJECTION, SOLUTION INTRAVENOUS at 21:42

## 2025-02-17 RX ADMIN — SODIUM CHLORIDE, PRESERVATIVE FREE 10 ML: 5 INJECTION INTRAVENOUS at 22:24

## 2025-02-17 RX ADMIN — AZITHROMYCIN DIHYDRATE 500 MG: 250 TABLET, FILM COATED ORAL at 21:17

## 2025-02-17 RX ADMIN — METOPROLOL TARTRATE: 5 INJECTION, SOLUTION INTRAVENOUS at 21:52

## 2025-02-17 ASSESSMENT — ENCOUNTER SYMPTOMS
COUGH: 0
NAUSEA: 0
SHORTNESS OF BREATH: 1
COLOR CHANGE: 0
DIARRHEA: 0
VOMITING: 0
CHEST TIGHTNESS: 1
SORE THROAT: 0
SINUS PRESSURE: 0
SINUS PAIN: 0
ABDOMINAL PAIN: 0
BLOOD IN STOOL: 0
CONSTIPATION: 0
WHEEZING: 0

## 2025-02-17 ASSESSMENT — PAIN - FUNCTIONAL ASSESSMENT: PAIN_FUNCTIONAL_ASSESSMENT: NONE - DENIES PAIN

## 2025-02-17 NOTE — ED NOTES
ED to inpatient nurses report      Chief Complaint:  Chief Complaint   Patient presents with    Fatigue    Shortness of Breath     Present to ED from: home    MOA:     LOC: alert and orientated to name and place  Mobility: Requires assistance * 2  Oxygen Baseline: 99% on RA    Current needs required: none     Code Status:   Prior    What abnormal results were found and what did you give/do to treat them?   none  Any procedures or intervention occur?   none    Mental Status:  Level of Consciousness: Alert (0)    Psych Assessment:        Vitals:  Patient Vitals for the past 24 hrs:   BP Temp Temp src Pulse Resp SpO2 Weight   02/17/25 1448 (!) 150/98 -- -- (!) 104 17 99 % --   02/17/25 1219 (!) 132/90 -- -- (!) 103 12 94 % --   02/17/25 0940 -- -- -- -- 20 96 % --   02/17/25 0927 109/70 97.9 °F (36.6 °C) Oral (!) 104 19 98 % 81.6 kg (180 lb)        LDAs:      Ambulatory Status:  No data recorded    Diagnosis:  DISPOSITION Admitted 02/17/2025 02:30:59 PM   Final diagnoses:   Leg swelling   Acute congestive heart failure, unspecified heart failure type (HCC)   Hyperlipidemia, unspecified hyperlipidemia type        Consults:  IP CONSULT TO HEART FAILURE NURSE/COORDINATOR     Pain Score:  Pain Assessment  Pain Assessment: None - Denies Pain    C-SSRS:        Sepsis Screening:       Laverne Fall Risk:       Swallow Screening        Preferred Language:   English      ALLERGIES     Asa [aspirin]    SURGICAL HISTORY       Past Surgical History:   Procedure Laterality Date    JOINT REPLACEMENT Bilateral 2013    HIP    [place] LAPAROSCOPY SURG CHOLECYSTECTOMY N/A 7/16/2018    CHOLECYSTECTOMY LAPAROSCOPIC performed by Enrique Stauffer MD at [facility] [place] OR    [place] LAPAROSCOPY SURG RPR INITIAL INGUINAL HERNIA Right 7/16/2018    Right HERNIA INGUINAL REPAIR performed by Enrique Stauffer MD at [facility] [place] OR       PAST MEDICAL HISTORY       Past Medical History:   Diagnosis Date    BPH with elevated PSA     Cerebral artery occlusion with cerebral

## 2025-02-17 NOTE — PROCEDURES
Pre-Procedure Diagnosis:  pleural effusion    Procedure Performed:  Thoracentesis    Anesthesia: local     Findings: 400 ml bloody fluid removed    Immediate Complications:  None    Estimated Blood Loss: minimal    SEE DICTATED PROCEDURE NOTE FOR COMPLETE DETAILS.    Bryan Ramsay MD   2/17/2025 3:54 PM

## 2025-02-17 NOTE — ED PROVIDER NOTES
[facility] EMERGENCY DEPARTMENT - VISIT NOTE    Patient Name: Tiago Tian  MRN: [id number]  YOB: 1933  Date of Evaluation: 2/17/2025  Treating Resident Physician: Ezra Alexandra MD  Supervising Physician: Paddy Salazar MD    CHIEF COMPLAINT       Chief Complaint   Patient presents with    Fatigue    Shortness of Breath       HISTORY OF PRESENT ILLNESS    HPI    History obtained from the patient and family members.    Tiago is a 89 y.o. old male with history of CKD, hypertension, paroxysmal A-fib who presents to the emergency department by Wheelchair for evaluation of fatigue and shortness of breath.  Family who are present describe that yesterday patient was having significantly elevated heart rate at home up to 180s and that this morning heart rate noted to be as low as 50s.  Patient is not anticoagulated secondary to fall risk.  Patient describes exertional dyspnea, generalized fatigue since yesterday.  Normally is able to ambulate at home with a walker, today having increased difficulty ambulating secondary to generalized weakness.    Chart reviewed, relevant history summarized in HPI above.      REVIEW OF SYSTEMS   Review of Systems  Negative unless documented in HPI    PAST MEDICAL AND SURGICAL HISTORY   Tiago  has a past medical history of BPH with elevated PSA, Cerebral artery occlusion with cerebral infarction (HCC), CHF (congestive heart failure) (HCC), Elevated PSA, Hyperlipidemia, Hypertension, Osteoarthritis, PAC (premature atrial contraction), Paget disease of bone, S/P laparoscopic cholecystectomy (07/18/2018), S/P right inguinal hernia repair (07/18/2018), Urinary incontinence (8/9/23), and Urinary retention.    Tiago  has a past surgical history that includes joint replacement (Bilateral, 2013); pr laparoscopy surg cholecystectomy (N/A, 7/16/2018); and pr laparoscopy surg rpr initial inguinal hernia (Right, 7/16/2018).    CURRENT MEDICATIONS   Tiago has a current medication 
recognition software.  It may contain   minor errors which are inherent in voice recognition technology.**      Electronically signed by Dr. Bryan Ramsay      US THORACENTESIS Which side should the procedure be performed? Left   Final Result   Status post thoracentesis            **This report has been created using voice recognition software.  It may contain   minor errors which are inherent in voice recognition technology.**      Electronically signed by Dr. Bryan Ramsay      XR CHEST PORTABLE   Final Result   1. Borderline heart size. Mild bibasilar Katt/pneumonia.   2. No residual pleural effusion seen left side. No pneumothorax seen following   recent left-sided thoracentesis.   3. Overall appearance of chest improved from prior.            **This report has been created using voice recognition software.  It may contain   minor errors which are inherent in voice recognition technology.**      Electronically signed by Dr. Bryan Ramsay      CT CHEST WO CONTRAST   Final Result   1. Moderate left pleural effusion and left lower lobe consolidation with patchy   bilateral upper and lower lobe groundglass opacities can indicate pneumonia in   the appropriate clinical setting. Mediastinal lymphadenopathy may be reactive.   Follow-up to ensure resolution is advised.      2. Marked splenomegaly is noted in the limited images through the upper abdomen.   Correlate with hematologic values.      3. 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            **This report has been created using voice recognition software.  It may contain   minor errors which are inherent in voice recognition technology.**      Electronically signed by Dr. Susan Joshi      XR CHEST PORTABLE   Final Result   1. Cardiomegaly with pulmonary vascular congestion.   2. Dense area of consolidation at the left lung base. This could represent   infiltrate or atelectasis.   3. Small left pleural effusion.

## 2025-02-17 NOTE — CONSENT
Formulation and discussion of sedation / procedure plans, risks, benefits, side effects and alternatives with patient and/or responsible adult completed.    History and Physical reviewed and unchanged.    Electronically signed by Bryan Ramsay MD on 2/17/25 at 3:54 PM EST

## 2025-02-17 NOTE — H&P
2/16/2025  No Yes   Sig: Take 1 tablet by mouth daily   simvastatin (ZOCOR) 5 MG tablet 2/16/2025  No Yes   Sig: Take 1 tablet by mouth nightly      Facility-Administered Medications: None       Allergies:  Asa [aspirin]    Past Medical, Family, Social, Surgical Hx      Diagnosis Date    BPH with elevated PSA     Cerebral artery occlusion with cerebral infarction (HCC)     CHF (congestive heart failure) (HCC)     Elevated PSA     Hyperlipidemia     Hypertension     Osteoarthritis     PAC (premature atrial contraction)     Paget disease of bone     S/P laparoscopic cholecystectomy 07/18/2018    S/P right inguinal hernia repair 07/18/2018    Urinary incontinence 8/9/23    Has catheter    Urinary retention          Procedure Laterality Date    JOINT REPLACEMENT Bilateral 2013    HIP    MI LAPAROSCOPY SURG CHOLECYSTECTOMY N/A 7/16/2018    CHOLECYSTECTOMY LAPAROSCOPIC performed by Enrique Stauffer MD at [facility] OR    MI LAPAROSCOPY SURG RPR INITIAL INGUINAL HERNIA Right 7/16/2018    Right HERNIA INGUINAL REPAIR performed by Enrique Stauffer MD at [facility] OR         Problem Relation Age of Onset    Diabetes Mother     Stroke Father     Stroke Sister     Cancer Brother     Heart Disease Brother     Stroke Brother     Heart Disease Brother     Stroke Brother      Social History     Socioeconomic History    Marital status:      Spouse name: Dneise Traylor    Number of children: 4    Years of education: 8    Highest education level: None   Tobacco Use    Smoking status: Never    Smokeless tobacco: Never   Vaping Use    Vaping status: Never Used   Substance and Sexual Activity    Alcohol use: No    Drug use: No    Sexual activity: Not Currently     Social Determinants of Health     Financial Resource Strain: Low Risk  (3/6/2024)    Overall Financial Resource Strain (CARDIA)     Difficulty of Paying Living Expenses: Not hard at all   Food Insecurity: No Food Insecurity (3/6/2024)    Hunger Vital Sign     Worried About Running

## 2025-02-17 NOTE — ED NOTES
Pt brought to ER by family for concern of increased SOB with ambulation and possible Afib. Son states when he puts the pulse ox on patient after ambulation, his heart rate bounces between 50's-180's. Patient has no complaints, family reports he will not complain of anything. Denies any recent illness or fever.

## 2025-02-18 ENCOUNTER — APPOINTMENT (OUTPATIENT)
Age: 89
DRG: 308 | End: 2025-02-18
Payer: MEDICARE

## 2025-02-18 PROBLEM — I48.91 A-FIB (HCC): Status: ACTIVE | Noted: 2025-02-18

## 2025-02-18 PROBLEM — J90 PLEURISY WITH EFFUSION: Status: ACTIVE | Noted: 2025-02-18

## 2025-02-18 PROBLEM — Z97.8 FOLEY CATHETER IN PLACE PRIOR TO ARRIVAL: Status: ACTIVE | Noted: 2025-02-18

## 2025-02-18 PROBLEM — Z71.89 GOALS OF CARE, COUNSELING/DISCUSSION: Status: ACTIVE | Noted: 2025-02-18

## 2025-02-18 PROBLEM — J90 EXUDATIVE PLEURAL EFFUSION: Status: ACTIVE | Noted: 2025-02-18

## 2025-02-18 LAB
ALBUMIN SERPL BCG-MCNC: 3.4 G/DL (ref 3.5–5.1)
ALP SERPL-CCNC: 179 U/L (ref 38–126)
ALT SERPL W/O P-5'-P-CCNC: 11 U/L (ref 11–66)
ANION GAP SERPL CALC-SCNC: 16 MEQ/L (ref 8–16)
ANISOCYTOSIS BLD QL SMEAR: PRESENT
AST SERPL-CCNC: 18 U/L (ref 5–40)
BACTERIA: ABNORMAL
BASOPHILS ABSOLUTE: 0 THOU/MM3 (ref 0–0.1)
BASOPHILS NFR BLD AUTO: 0.2 %
BILIRUB CONJ SERPL-MCNC: 0.2 MG/DL (ref 0–0.3)
BILIRUB SERPL-MCNC: 0.6 MG/DL (ref 0.3–1.2)
BILIRUB UR QL STRIP: NEGATIVE
BUN SERPL-MCNC: 35 MG/DL (ref 7–22)
CALCIUM SERPL-MCNC: 7.8 MG/DL (ref 8.5–10.5)
CASTS #/AREA URNS LPF: ABNORMAL /LPF
CHARACTER UR: ABNORMAL
CHARACTER, BODY FLUID: ABNORMAL
CHLORIDE SERPL-SCNC: 97 MEQ/L (ref 98–111)
CHOLEST SERPL-MCNC: 94 MG/DL (ref 100–199)
CO2 SERPL-SCNC: 22 MEQ/L (ref 23–33)
COLOR FLD: ABNORMAL
COLOR UR: ABNORMAL
CREAT SERPL-MCNC: 1.9 MG/DL (ref 0.4–1.2)
CRYSTALS URNS QL MICRO: ABNORMAL
DEPRECATED RDW RBC AUTO: 48.8 FL (ref 35–45)
ECHO AV CUSP MM: 1.6 CM
ECHO LV EF PHYSICIAN: 55 %
ECHO LV EJECTION FRACTION BIPLANE: 55 % (ref 55–100)
ECHO LV FRACTIONAL SHORTENING: 32 % (ref 28–44)
ECHO LV INTERNAL DIMENSION DIASTOLIC: 3.1 CM (ref 4.2–5.9)
ECHO LV INTERNAL DIMENSION SYSTOLIC: 2.1 CM
ECHO LV IVSD: 1.2 CM (ref 0.6–1)
ECHO LV MASS 2D: 114.2 G (ref 88–224)
ECHO LV POSTERIOR WALL DIASTOLIC: 1.2 CM (ref 0.6–1)
ECHO LV RELATIVE WALL THICKNESS RATIO: 0.77
ECHO RV INTERNAL DIMENSION: 2.1 CM
EKG ATRIAL RATE: 106 BPM
EKG ATRIAL RATE: 118 BPM
EKG ATRIAL RATE: 149 BPM
EKG ATRIAL RATE: 78 BPM
EKG P AXIS: 39 DEGREES
EKG P AXIS: 40 DEGREES
EKG P AXIS: 75 DEGREES
EKG P AXIS: 76 DEGREES
EKG P-R INTERVAL: 180 MS
EKG P-R INTERVAL: 80 MS
EKG P-R INTERVAL: 86 MS
EKG Q-T INTERVAL: 176 MS
EKG Q-T INTERVAL: 216 MS
EKG Q-T INTERVAL: 222 MS
EKG Q-T INTERVAL: 256 MS
EKG Q-T INTERVAL: 312 MS
EKG Q-T INTERVAL: 318 MS
EKG Q-T INTERVAL: 340 MS
EKG Q-T INTERVAL: 366 MS
EKG QRS DURATION: 56 MS
EKG QRS DURATION: 56 MS
EKG QRS DURATION: 66 MS
EKG QRS DURATION: 68 MS
EKG QRS DURATION: 70 MS
EKG QRS DURATION: 72 MS
EKG QTC CALCULATION (BAZETT): 336 MS
EKG QTC CALCULATION (BAZETT): 372 MS
EKG QTC CALCULATION (BAZETT): 402 MS
EKG QTC CALCULATION (BAZETT): 403 MS
EKG QTC CALCULATION (BAZETT): 417 MS
EKG QTC CALCULATION (BAZETT): 437 MS
EKG QTC CALCULATION (BAZETT): 447 MS
EKG QTC CALCULATION (BAZETT): 451 MS
EKG R AXIS: -34 DEGREES
EKG R AXIS: -35 DEGREES
EKG R AXIS: -36 DEGREES
EKG R AXIS: -38 DEGREES
EKG R AXIS: -41 DEGREES
EKG R AXIS: -43 DEGREES
EKG R AXIS: -44 DEGREES
EKG R AXIS: -47 DEGREES
EKG T AXIS: -12 DEGREES
EKG T AXIS: 107 DEGREES
EKG T AXIS: 107 DEGREES
EKG T AXIS: 141 DEGREES
EKG T AXIS: 25 DEGREES
EKG T AXIS: 26 DEGREES
EKG T AXIS: 45 DEGREES
EKG T AXIS: 52 DEGREES
EKG VENTRICULAR RATE: 106 BPM
EKG VENTRICULAR RATE: 118 BPM
EKG VENTRICULAR RATE: 119 BPM
EKG VENTRICULAR RATE: 149 BPM
EKG VENTRICULAR RATE: 169 BPM
EKG VENTRICULAR RATE: 208 BPM
EKG VENTRICULAR RATE: 220 BPM
EKG VENTRICULAR RATE: 78 BPM
EOSINOPHIL NFR BLD AUTO: 0.1 %
EOSINOPHILS ABSOLUTE: 0 THOU/MM3 (ref 0–0.4)
EPITHELIAL CELLS, UA: ABNORMAL /HPF
ERYTHROCYTE [DISTWIDTH] IN BLOOD BY AUTOMATED COUNT: 14.4 % (ref 11.5–14.5)
GFR SERPL CREATININE-BSD FRML MDRD: 33 ML/MIN/1.73M2
GLUCOSE SERPL-MCNC: 104 MG/DL (ref 70–108)
GLUCOSE UR QL STRIP.AUTO: NEGATIVE MG/DL
HCT VFR BLD AUTO: 32.9 % (ref 42–52)
HDLC SERPL-MCNC: 31 MG/DL
HGB BLD-MCNC: 10.4 GM/DL (ref 14–18)
HGB UR QL STRIP.AUTO: ABNORMAL
IMM GRANULOCYTES # BLD AUTO: 0.03 THOU/MM3 (ref 0–0.07)
IMM GRANULOCYTES NFR BLD AUTO: 0.3 %
KETONES UR QL STRIP.AUTO: NEGATIVE
LDLC SERPL CALC-MCNC: 49 MG/DL
LEUKOCYTE ESTERASE UR QL STRIP.AUTO: ABNORMAL
LYMPHOCYTES ABSOLUTE: 4.6 THOU/MM3 (ref 1–4.8)
LYMPHOCYTES NFR BLD AUTO: 49.7 %
LYMPHOCYTES NFR FLD MANUAL: 83 %
MAGNESIUM SERPL-MCNC: 2 MG/DL (ref 1.6–2.4)
MCH RBC QN AUTO: 29.1 PG (ref 26–33)
MCHC RBC AUTO-ENTMCNC: 31.6 GM/DL (ref 32.2–35.5)
MCV RBC AUTO: 91.9 FL (ref 80–94)
MONOCYTES ABSOLUTE: 0.5 THOU/MM3 (ref 0.4–1.3)
MONOCYTES NFR BLD AUTO: 5.6 %
MRSA DNA SPEC QL NAA+PROBE: NEGATIVE
NEUTROPHILS ABSOLUTE: 4.1 THOU/MM3 (ref 1.8–7.7)
NEUTROPHILS NFR BLD AUTO: 44.1 %
NEUTS SEG NFR FLD MANUAL: 17 %
NITRITE UR QL STRIP.AUTO: NEGATIVE
NRBC BLD AUTO-RTO: 0 /100 WBC
NUC CELL # FLD AUTO: 2680 /CUMM (ref 0–500)
PATHOLOGIST REVIEW: ABNORMAL
PH UR STRIP.AUTO: 6 [PH] (ref 5–9)
PLATELET # BLD AUTO: 119 THOU/MM3 (ref 130–400)
PLATELET BLD QL SMEAR: ABNORMAL
PMV BLD AUTO: 9.4 FL (ref 9.4–12.4)
POTASSIUM SERPL-SCNC: 4.3 MEQ/L (ref 3.5–5.2)
PROT SERPL-MCNC: 6.3 G/DL (ref 6.1–8)
PROT UR STRIP.AUTO-MCNC: 100 MG/DL
RBC # BLD AUTO: 3.58 MILL/MM3 (ref 4.7–6.1)
RBC # FLD AUTO: ABNORMAL /CUMM
RBC #/AREA URNS HPF: > 200 /HPF
SCAN OF BLOOD SMEAR: NORMAL
SODIUM SERPL-SCNC: 135 MEQ/L (ref 135–145)
SPECIFIC GRAVITY UA: 1.02 (ref 1–1.03)
SPECIMEN: ABNORMAL
TOTAL VOLUME RECEIVED BODY FLUID: 70 ML
TRIGL SERPL-MCNC: 70 MG/DL (ref 0–199)
UROBILINOGEN, URINE: 0.2 EU/DL (ref 0–1)
VARIANT LYMPHS BLD QL SMEAR: ABNORMAL %
WBC # BLD AUTO: 9.2 THOU/MM3 (ref 4.8–10.8)
WBC #/AREA URNS HPF: > 100 /HPF

## 2025-02-18 PROCEDURE — 92610 EVALUATE SWALLOWING FUNCTION: CPT

## 2025-02-18 PROCEDURE — 2580000003 HC RX 258

## 2025-02-18 PROCEDURE — 93307 TTE W/O DOPPLER COMPLETE: CPT

## 2025-02-18 PROCEDURE — 6370000000 HC RX 637 (ALT 250 FOR IP)

## 2025-02-18 PROCEDURE — 6360000002 HC RX W HCPCS

## 2025-02-18 PROCEDURE — 80053 COMPREHEN METABOLIC PANEL: CPT

## 2025-02-18 PROCEDURE — 93005 ELECTROCARDIOGRAM TRACING: CPT

## 2025-02-18 PROCEDURE — 85025 COMPLETE CBC W/AUTO DIFF WBC: CPT

## 2025-02-18 PROCEDURE — 2500000003 HC RX 250 WO HCPCS

## 2025-02-18 PROCEDURE — 2060000000 HC ICU INTERMEDIATE R&B

## 2025-02-18 PROCEDURE — 93307 TTE W/O DOPPLER COMPLETE: CPT | Performed by: INTERNAL MEDICINE

## 2025-02-18 PROCEDURE — 36415 COLL VENOUS BLD VENIPUNCTURE: CPT

## 2025-02-18 PROCEDURE — 99221 1ST HOSP IP/OBS SF/LOW 40: CPT | Performed by: UROLOGY

## 2025-02-18 PROCEDURE — 83735 ASSAY OF MAGNESIUM: CPT

## 2025-02-18 PROCEDURE — 99233 SBSQ HOSP IP/OBS HIGH 50: CPT | Performed by: INTERNAL MEDICINE

## 2025-02-18 PROCEDURE — 92526 ORAL FUNCTION THERAPY: CPT

## 2025-02-18 PROCEDURE — 80061 LIPID PANEL: CPT

## 2025-02-18 PROCEDURE — 82248 BILIRUBIN DIRECT: CPT

## 2025-02-18 RX ORDER — ENOXAPARIN SODIUM 100 MG/ML
30 INJECTION SUBCUTANEOUS DAILY
Status: DISCONTINUED | OUTPATIENT
Start: 2025-02-19 | End: 2025-02-20 | Stop reason: HOSPADM

## 2025-02-18 RX ORDER — HYDROXYZINE HYDROCHLORIDE 10 MG/1
10 TABLET, FILM COATED ORAL 3 TIMES DAILY PRN
Status: DISCONTINUED | OUTPATIENT
Start: 2025-02-18 | End: 2025-02-20 | Stop reason: HOSPADM

## 2025-02-18 RX ADMIN — FUROSEMIDE 20 MG: 10 INJECTION, SOLUTION INTRAMUSCULAR; INTRAVENOUS at 09:29

## 2025-02-18 RX ADMIN — AZITHROMYCIN DIHYDRATE 500 MG: 250 TABLET, FILM COATED ORAL at 09:28

## 2025-02-18 RX ADMIN — METOPROLOL SUCCINATE 50 MG: 50 TABLET, FILM COATED, EXTENDED RELEASE ORAL at 09:29

## 2025-02-18 RX ADMIN — SODIUM CHLORIDE, PRESERVATIVE FREE 10 ML: 5 INJECTION INTRAVENOUS at 09:29

## 2025-02-18 RX ADMIN — WATER 1000 MG: 1 INJECTION INTRAMUSCULAR; INTRAVENOUS; SUBCUTANEOUS at 21:57

## 2025-02-18 RX ADMIN — HYDROXYZINE HYDROCHLORIDE 10 MG: 10 TABLET ORAL at 03:47

## 2025-02-18 RX ADMIN — DILTIAZEM HYDROCHLORIDE 5 MG/HR: 5 INJECTION, SOLUTION INTRAVENOUS at 20:43

## 2025-02-18 NOTE — SIGNIFICANT EVENT
Responded to bedside for rapid response.  On arrival, the patient HR noted to be 213 with a blood pressure of 84/56. Patient denies chest pain or shortness of breath.  No palpitations. Nursing reports the patient had just been up to the bathroom.  Pacer pads placed.  HR improving to 160-190's.  Recycled blood pressure 163/71.  Decision made to give 5 of Metoprolol however, patient spontaneously converted to normal sinus rhythm.  EKG confirms.  No further interventions needed at this time.  Daughter at bedside given full update on plan of care.  All questions answered.      Nisha Caruso, APRN - CNP

## 2025-02-18 NOTE — PROCEDURES
PROCEDURE NOTE  Date: 2/17/2025   Name: Tiago Tian  YOB: 1933    Procedures  Two 12 lead EKG's completed at 1924 *1927. Results handed to rapid response team.

## 2025-02-18 NOTE — PLAN OF CARE
Problem: Chronic Conditions and Co-morbidities  Goal: Patient's chronic conditions and co-morbidity symptoms are monitored and maintained or improved  Outcome: Progressing  Flowsheets (Taken 2/17/2025 2300)  Care Plan - Patient's Chronic Conditions and Co-Morbidity Symptoms are Monitored and Maintained or Improved: Monitor and assess patient's chronic conditions and comorbid symptoms for stability, deterioration, or improvement     Problem: Discharge Planning  Goal: Discharge to home or other facility with appropriate resources  Outcome: Progressing  Flowsheets (Taken 2/17/2025 2300)  Discharge to home or other facility with appropriate resources: Identify barriers to discharge with patient and caregiver     Problem: Skin/Tissue Integrity  Goal: Skin integrity remains intact  Description: 1.  Monitor for areas of redness and/or skin breakdown  2.  Assess vascular access sites hourly  3.  Every 4-6 hours minimum:  Change oxygen saturation probe site  4.  Every 4-6 hours:  If on nasal continuous positive airway pressure, respiratory therapy assess nares and determine need for appliance change or resting period  Outcome: Progressing  Flowsheets (Taken 2/17/2025 2215)  Skin Integrity Remains Intact: Monitor for areas of redness and/or skin breakdown     Problem: ABCDS Injury Assessment  Goal: Absence of physical injury  Outcome: Progressing     Problem: Safety - Adult  Goal: Free from fall injury  Outcome: Progressing   Care plan reviewed with patient.  Patient verbalize understanding of the plan of care and contribute to goal setting.

## 2025-02-18 NOTE — PROCEDURES
PROCEDURE NOTE  Date: 2/18/2025   Name: Tiago Tian  YOB: 1933    Procedures  12 lead EKG completed. Results handed to Jesica GAMBLE.

## 2025-02-18 NOTE — PLAN OF CARE
Problem: Chronic Conditions and Co-morbidities  Goal: Patient's chronic conditions and co-morbidity symptoms are monitored and maintained or improved  Outcome: Progressing     Problem: Discharge Planning  Goal: Discharge to home or other facility with appropriate resources  Outcome: Progressing     Problem: Skin/Tissue Integrity  Goal: Skin integrity remains intact  Description: 1.  Monitor for areas of redness and/or skin breakdown  2.  Assess vascular access sites hourly  3.  Every 4-6 hours minimum:  Change oxygen saturation probe site  4.  Every 4-6 hours:  If on nasal continuous positive airway pressure, respiratory therapy assess nares and determine need for appliance change or resting period  Outcome: Progressing     Problem: ABCDS Injury Assessment  Goal: Absence of physical injury  Outcome: Progressing     Problem: Safety - Adult  Goal: Free from fall injury  Outcome: Progressing     Care plan reviewed with patient.  Patient verbalize understanding of the plan of care and contribute to goal setting.

## 2025-02-18 NOTE — CONSULTS
[facility] ICU STEPDOWN TELEMETRY 4K  [address] [facility] [address]  Dept: [phone]  Loc: [phone]  Visit Date: 2/17/2025    Urology Consult Note    Reason for Consult:  Indwelling catheter placement   Requesting Physician:  medicine    History Obtained From:  patient, electronic medical record    Chief Complaint: SOB    HISTORY OF PRESENT ILLNESS:                The patient is a 89 y.o. male with significant past medical history of  PMHx of paroxysmal A-fib who presents to [facility] with shortness of breath and chest pain.  Patient reports a 2 to 3-day history of chest pain associated with shortness of breath and dyspnea on exertion   Uro was consulted above  Toro was exchanged in office 2/3/25  Toro was eventually able to be inserted overnight 1L documented UOP  Urine is tea colored  Past Medical History:        Diagnosis Date    BPH with elevated PSA     Cerebral artery occlusion with cerebral infarction (HCC)     CHF (congestive heart failure) (HCC)     Elevated PSA     Hyperlipidemia     Hypertension     Osteoarthritis     PAC (premature atrial contraction)     Paget disease of bone     S/P laparoscopic cholecystectomy 07/18/2018    S/P right inguinal hernia repair 07/18/2018    Urinary incontinence 8/9/23    Has catheter    Urinary retention      Past Surgical History:        Procedure Laterality Date    JOINT REPLACEMENT Bilateral 2013    HIP    ME LAPAROSCOPY SURG CHOLECYSTECTOMY N/A 7/16/2018    CHOLECYSTECTOMY LAPAROSCOPIC performed by Enrique Stauffer MD at [facility] OR    ME LAPAROSCOPY SURG RPR INITIAL INGUINAL HERNIA Right 7/16/2018    Right HERNIA INGUINAL REPAIR performed by Enrique Stauffer MD at [facility] OR     Allergies:  Asa [aspirin]  Social History:  Social History     Socioeconomic History    Marital status:      Spouse name: Denise Traylor    Number of children: 4    Years of education: 8    Highest education level: Not on

## 2025-02-18 NOTE — SIGNIFICANT EVENT
~2130, Rapid Response called for SVT. On arrival, patient denied CP, palpitation, SOB, HA, dizziness, N/V. , DHI502, MAP>75. RN reported that patient tried to get out of bed to urinate again. Patient has been pleasantly confused w/o appropriate insight since admission. Pacer Pads placed. Recycled BP maintains good MAP. EKG reveals SVT. Given Adenosine 6mg w/ decreased HR to 110 but quickly returned to >200. Adenosine 12mg administered w/ decreased HR to 110 lasting ~5-10min before returning to >200. Given IV Lopressor 5mg and HR gradually decreased to 110-120 over 10 min. EKG revealed Afib RVR. /104. Start Cardizem bolus + gtt. Transfer to Step Down.    Ongoing diuresis for new CHF but not producing UOP. Bladder scan reveal >300cc volume. Still unable to place new Toro Cath (6 unsuccessful attempts) after replacing chronic one upon arrival to floor. RN had discussed w/ Urology who recommended limiting PO intake and they will see in the morning. Discussed w/ resource nurse to try w/ successful Toro Placement.    Electronically signed by Stalin Grace DO on 2/17/2025 at 10:32 PM

## 2025-02-19 PROBLEM — M79.89 LEG SWELLING: Status: ACTIVE | Noted: 2025-02-19

## 2025-02-19 PROBLEM — E43 SEVERE MALNUTRITION: Status: ACTIVE | Noted: 2025-02-19

## 2025-02-19 LAB
ANION GAP SERPL CALC-SCNC: 17 MEQ/L (ref 8–16)
AUTO DIFF PNL BLD: ABNORMAL
BACTERIA UR CULT: ABNORMAL
BASOPHILS ABSOLUTE: 0 THOU/MM3 (ref 0–0.1)
BASOPHILS NFR BLD AUTO: 0.2 %
BUN SERPL-MCNC: 38 MG/DL (ref 7–22)
CALCIUM SERPL-MCNC: 8 MG/DL (ref 8.2–9.6)
CHLORIDE SERPL-SCNC: 96 MEQ/L (ref 98–111)
CO2 SERPL-SCNC: 23 MEQ/L (ref 23–33)
CREAT SERPL-MCNC: 1.7 MG/DL (ref 0.4–1.2)
DEPRECATED RDW RBC AUTO: 48 FL (ref 35–45)
EOSINOPHIL NFR BLD AUTO: 0.6 %
EOSINOPHILS ABSOLUTE: 0.1 THOU/MM3 (ref 0–0.4)
ERYTHROCYTE [DISTWIDTH] IN BLOOD BY AUTOMATED COUNT: 14.5 % (ref 11.5–14.5)
GFR SERPL CREATININE-BSD FRML MDRD: 37 ML/MIN/1.73M2
GLUCOSE SERPL-MCNC: 92 MG/DL (ref 70–108)
HCT VFR BLD AUTO: 34.7 % (ref 42–52)
HGB BLD-MCNC: 11.1 GM/DL (ref 14–18)
IMM GRANULOCYTES # BLD AUTO: 0.02 THOU/MM3 (ref 0–0.07)
IMM GRANULOCYTES NFR BLD AUTO: 0.2 %
LYMPHOCYTES ABSOLUTE: 6.1 THOU/MM3 (ref 1–4.8)
LYMPHOCYTES NFR BLD AUTO: 57.8 %
MAGNESIUM SERPL-MCNC: 1.9 MG/DL (ref 1.6–2.4)
MCH RBC QN AUTO: 29.1 PG (ref 26–33)
MCHC RBC AUTO-ENTMCNC: 32 GM/DL (ref 32.2–35.5)
MCV RBC AUTO: 91.1 FL (ref 80–94)
MONOCYTES ABSOLUTE: 0.5 THOU/MM3 (ref 0.4–1.3)
MONOCYTES NFR BLD AUTO: 5 %
NEUTROPHILS ABSOLUTE: 3.8 THOU/MM3 (ref 1.8–7.7)
NEUTROPHILS NFR BLD AUTO: 36.2 %
NRBC BLD AUTO-RTO: 0 /100 WBC
NT-PROBNP SERPL IA-MCNC: 6333 PG/ML (ref 0–449)
ORGANISM: ABNORMAL
PATHOLOGIST REVIEW: ABNORMAL
PLATELET # BLD AUTO: 128 THOU/MM3 (ref 130–400)
PLATELET BLD QL SMEAR: ABNORMAL
PMV BLD AUTO: 9.4 FL (ref 9.4–12.4)
POTASSIUM SERPL-SCNC: 4.5 MEQ/L (ref 3.5–5.2)
RBC # BLD AUTO: 3.81 MILL/MM3 (ref 4.7–6.1)
SCAN OF BLOOD SMEAR: NORMAL
SODIUM SERPL-SCNC: 136 MEQ/L (ref 135–145)
VARIANT LYMPHS BLD QL SMEAR: ABNORMAL %
WBC # BLD AUTO: 10.5 THOU/MM3 (ref 4.8–10.8)

## 2025-02-19 PROCEDURE — 85025 COMPLETE CBC W/AUTO DIFF WBC: CPT

## 2025-02-19 PROCEDURE — 2580000003 HC RX 258

## 2025-02-19 PROCEDURE — 36415 COLL VENOUS BLD VENIPUNCTURE: CPT

## 2025-02-19 PROCEDURE — 6370000000 HC RX 637 (ALT 250 FOR IP)

## 2025-02-19 PROCEDURE — 6360000002 HC RX W HCPCS

## 2025-02-19 PROCEDURE — 83735 ASSAY OF MAGNESIUM: CPT

## 2025-02-19 PROCEDURE — 83880 ASSAY OF NATRIURETIC PEPTIDE: CPT

## 2025-02-19 PROCEDURE — 2500000003 HC RX 250 WO HCPCS

## 2025-02-19 PROCEDURE — 2060000000 HC ICU INTERMEDIATE R&B

## 2025-02-19 PROCEDURE — 99231 SBSQ HOSP IP/OBS SF/LOW 25: CPT | Performed by: UROLOGY

## 2025-02-19 PROCEDURE — 80048 BASIC METABOLIC PNL TOTAL CA: CPT

## 2025-02-19 PROCEDURE — 99232 SBSQ HOSP IP/OBS MODERATE 35: CPT

## 2025-02-19 RX ORDER — LORAZEPAM 2 MG/ML
1 INJECTION INTRAMUSCULAR EVERY 4 HOURS PRN
Status: DISCONTINUED | OUTPATIENT
Start: 2025-02-19 | End: 2025-02-20 | Stop reason: HOSPADM

## 2025-02-19 RX ORDER — DILTIAZEM HCL 60 MG
60 TABLET ORAL EVERY 8 HOURS SCHEDULED
Status: DISCONTINUED | OUTPATIENT
Start: 2025-02-19 | End: 2025-02-20 | Stop reason: HOSPADM

## 2025-02-19 RX ORDER — METOPROLOL TARTRATE 50 MG
50 TABLET ORAL 2 TIMES DAILY
Status: DISCONTINUED | OUTPATIENT
Start: 2025-02-19 | End: 2025-02-20 | Stop reason: HOSPADM

## 2025-02-19 RX ADMIN — DILTIAZEM HYDROCHLORIDE 60 MG: 60 TABLET ORAL at 23:49

## 2025-02-19 RX ADMIN — AZITHROMYCIN DIHYDRATE 500 MG: 250 TABLET, FILM COATED ORAL at 07:59

## 2025-02-19 RX ADMIN — SODIUM CHLORIDE, PRESERVATIVE FREE 10 ML: 5 INJECTION INTRAVENOUS at 08:00

## 2025-02-19 RX ADMIN — METOPROLOL TARTRATE 50 MG: 50 TABLET, FILM COATED ORAL at 22:02

## 2025-02-19 RX ADMIN — METOPROLOL SUCCINATE 50 MG: 50 TABLET, FILM COATED, EXTENDED RELEASE ORAL at 07:59

## 2025-02-19 RX ADMIN — POLYETHYLENE GLYCOL 3350 17 G: 17 POWDER, FOR SOLUTION ORAL at 06:32

## 2025-02-19 RX ADMIN — LORAZEPAM 1 MG: 2 INJECTION INTRAMUSCULAR; INTRAVENOUS at 21:53

## 2025-02-19 RX ADMIN — HYDROXYZINE HYDROCHLORIDE 10 MG: 10 TABLET ORAL at 11:53

## 2025-02-19 RX ADMIN — HYDROXYZINE HYDROCHLORIDE 10 MG: 10 TABLET ORAL at 00:01

## 2025-02-19 RX ADMIN — SODIUM CHLORIDE, PRESERVATIVE FREE 10 ML: 5 INJECTION INTRAVENOUS at 22:02

## 2025-02-19 RX ADMIN — WATER 1000 MG: 1 INJECTION INTRAMUSCULAR; INTRAVENOUS; SUBCUTANEOUS at 21:54

## 2025-02-19 RX ADMIN — DILTIAZEM HYDROCHLORIDE 7.5 MG/HR: 5 INJECTION, SOLUTION INTRAVENOUS at 13:22

## 2025-02-19 RX ADMIN — ENOXAPARIN SODIUM 30 MG: 100 INJECTION SUBCUTANEOUS at 07:59

## 2025-02-19 RX ADMIN — LORAZEPAM 1 MG: 2 INJECTION INTRAMUSCULAR; INTRAVENOUS at 13:13

## 2025-02-19 NOTE — PLAN OF CARE
Problem: Chronic Conditions and Co-morbidities  Goal: Patient's chronic conditions and co-morbidity symptoms are monitored and maintained or improved  2/19/2025 0948 by Britany Rodriguez RN  Outcome: Progressing  Flowsheets (Taken 2/18/2025 2030 by Jesica Higuera RN)  Care Plan - Patient's Chronic Conditions and Co-Morbidity Symptoms are Monitored and Maintained or Improved: Monitor and assess patient's chronic conditions and comorbid symptoms for stability, deterioration, or improvement  2/19/2025 0145 by Jesica Higuera RN  Outcome: Progressing  Flowsheets (Taken 2/18/2025 2030)  Care Plan - Patient's Chronic Conditions and Co-Morbidity Symptoms are Monitored and Maintained or Improved: Monitor and assess patient's chronic conditions and comorbid symptoms for stability, deterioration, or improvement     Problem: Discharge Planning  Goal: Discharge to home or other facility with appropriate resources  2/19/2025 0948 by Britany Rodriguez RN  Outcome: Progressing  Flowsheets (Taken 2/18/2025 2030 by Jesica Higuera RN)  Discharge to home or other facility with appropriate resources: Identify barriers to discharge with patient and caregiver  2/19/2025 0145 by Jesica Higuera RN  Outcome: Progressing  Flowsheets (Taken 2/18/2025 2030)  Discharge to home or other facility with appropriate resources: Identify barriers to discharge with patient and caregiver     Problem: Skin/Tissue Integrity  Goal: Skin integrity remains intact  Description: 1.  Monitor for areas of redness and/or skin breakdown  2.  Assess vascular access sites hourly  3.  Every 4-6 hours minimum:  Change oxygen saturation probe site  4.  Every 4-6 hours:  If on nasal continuous positive airway pressure, respiratory therapy assess nares and determine need for appliance change or resting period  2/19/2025 0948 by Britany Rodriguez RN  Outcome: Progressing  Flowsheets (Taken 2/18/2025 2030 by Jesica Higuera RN)  Skin Integrity

## 2025-02-19 NOTE — PLAN OF CARE
Problem: Chronic Conditions and Co-morbidities  Goal: Patient's chronic conditions and co-morbidity symptoms are monitored and maintained or improved  2/19/2025 0145 by Jesica Higuera RN  Outcome: Progressing  Flowsheets (Taken 2/18/2025 2030)  Care Plan - Patient's Chronic Conditions and Co-Morbidity Symptoms are Monitored and Maintained or Improved: Monitor and assess patient's chronic conditions and comorbid symptoms for stability, deterioration, or improvement     Problem: Discharge Planning  Goal: Discharge to home or other facility with appropriate resources  2/19/2025 0145 by Jesica Higuera RN  Outcome: Progressing  Flowsheets (Taken 2/18/2025 2030)  Discharge to home or other facility with appropriate resources: Identify barriers to discharge with patient and caregiver     Problem: Skin/Tissue Integrity  Goal: Skin integrity remains intact  Description: 1.  Monitor for areas of redness and/or skin breakdown  2.  Assess vascular access sites hourly  3.  Every 4-6 hours minimum:  Change oxygen saturation probe site  4.  Every 4-6 hours:  If on nasal continuous positive airway pressure, respiratory therapy assess nares and determine need for appliance change or resting period  2/19/2025 0145 by Jesica Higuera RN  Outcome: Progressing  Flowsheets (Taken 2/18/2025 2030)  Skin Integrity Remains Intact: Monitor for areas of redness and/or skin breakdown     Problem: ABCDS Injury Assessment  Goal: Absence of physical injury  2/19/2025 0145 by Jesica Higuera, RN  Outcome: Progressing   Care plan reviewed with patient.  Patient verbalize understanding of the plan of care and contribute to goal setting.

## 2025-02-19 NOTE — CARE COORDINATION
Case Management Assessment Initial Evaluation    Date/Time of Evaluation: 2025 11:14 AM  Assessment Completed by: Anitha Jefferson RN    If patient is discharged prior to next notation, then this note serves as note for discharge by case management.    Patient Name: Tiago Tian                   YOB: 1933  Diagnosis: Leg swelling [M79.89]  Hyperlipidemia, unspecified hyperlipidemia type [E78.5]  New onset of congestive heart failure (HCC) [I50.9]  Acute congestive heart failure, unspecified heart failure type (HCC) [I50.9]  A-fib (HCC) [I48.91]                   Date / Time: 2025  9:12 AM  Location: [id number]     Patient Admission Status: Inpatient   Readmission Risk Low 0-14, Mod 15-19), High > 20: Readmission Risk Score: 16.1    Current PCP: Iain Hannah MD  Health Care Decision Makers:   Primary Decision Maker: Denise Tian - Spouse - [phone]    Additional Case Management Notes: Tiago presented to the ED with CP and SOB. Admitted by hospitalist w consult to cardiology and urology (difficult jane catheter placement). Pt is on a Cardizem gtt after a run of SVT . Telemetry. Echo pending. Hospice consult is in place. Pt wishes to be DNR-CC on hospice. TBD if he will qualify for GIP or go home on hospice.     Procedures:    Rapid response for SVT.    US Guided Thoracentesis w 400 mL aaron blood removed.     Imagin/17 CT Chest:   1. Moderate left pleural effusion and left lower lobe consolidation with patchy bilateral upper and lower lobe groundglass opacities can indicate pneumonia in the appropriate clinical setting. Mediastinal lymphadenopathy may be reactive. Follow-up to ensure resolution is advised.  2. Marked splenomegaly is noted in the limited images through the upper abdomen. Correlate with hematologic values.  3. 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     CXR:   1. Borderline heart size. Mild

## 2025-02-20 VITALS
TEMPERATURE: 99.8 F | SYSTOLIC BLOOD PRESSURE: 134 MMHG | DIASTOLIC BLOOD PRESSURE: 92 MMHG | RESPIRATION RATE: 30 BRPM | WEIGHT: 178.5 LBS | BODY MASS INDEX: 24.18 KG/M2 | OXYGEN SATURATION: 97 % | HEIGHT: 72 IN | HEART RATE: 112 BPM

## 2025-02-20 PROCEDURE — 2500000003 HC RX 250 WO HCPCS

## 2025-02-20 PROCEDURE — 99231 SBSQ HOSP IP/OBS SF/LOW 25: CPT | Performed by: UROLOGY

## 2025-02-20 PROCEDURE — 6360000002 HC RX W HCPCS

## 2025-02-20 PROCEDURE — 6370000000 HC RX 637 (ALT 250 FOR IP)

## 2025-02-20 PROCEDURE — 99239 HOSP IP/OBS DSCHRG MGMT >30: CPT

## 2025-02-20 RX ORDER — METOPROLOL TARTRATE 50 MG
50 TABLET ORAL 2 TIMES DAILY
Qty: 60 TABLET | Refills: 3 | Status: SHIPPED | OUTPATIENT
Start: 2025-02-20

## 2025-02-20 RX ORDER — DILTIAZEM HCL 60 MG
60 TABLET ORAL EVERY 8 HOURS SCHEDULED
Qty: 120 TABLET | Refills: 3 | Status: SHIPPED | OUTPATIENT
Start: 2025-02-20

## 2025-02-20 RX ORDER — HYDROXYZINE HYDROCHLORIDE 10 MG/1
10 TABLET, FILM COATED ORAL 3 TIMES DAILY PRN
Qty: 30 TABLET | Refills: 0 | Status: SHIPPED | OUTPATIENT
Start: 2025-02-20 | End: 2025-03-22

## 2025-02-20 RX ORDER — GLYCOPYRROLATE 0.2 MG/ML
0.1 INJECTION INTRAMUSCULAR; INTRAVENOUS 2 TIMES DAILY
Status: DISCONTINUED | OUTPATIENT
Start: 2025-02-20 | End: 2025-02-20 | Stop reason: HOSPADM

## 2025-02-20 RX ADMIN — LORAZEPAM 1 MG: 2 INJECTION INTRAMUSCULAR; INTRAVENOUS at 08:48

## 2025-02-20 RX ADMIN — SODIUM CHLORIDE, PRESERVATIVE FREE 10 ML: 5 INJECTION INTRAVENOUS at 08:42

## 2025-02-20 RX ADMIN — ACETAMINOPHEN 650 MG: 650 SUPPOSITORY RECTAL at 08:42

## 2025-02-20 RX ADMIN — GLYCOPYRROLATE 0.1 MG: 0.2 INJECTION INTRAMUSCULAR; INTRAVENOUS at 10:26

## 2025-02-20 NOTE — PLAN OF CARE
Problem: Chronic Conditions and Co-morbidities  Goal: Patient's chronic conditions and co-morbidity symptoms are monitored and maintained or improved  Outcome: Progressing  Flowsheets (Taken 2/19/2025 2145)  Care Plan - Patient's Chronic Conditions and Co-Morbidity Symptoms are Monitored and Maintained or Improved: Monitor and assess patient's chronic conditions and comorbid symptoms for stability, deterioration, or improvement     Problem: Discharge Planning  Goal: Discharge to home or other facility with appropriate resources  Outcome: Progressing  Flowsheets (Taken 2/19/2025 2145)  Discharge to home or other facility with appropriate resources: Identify barriers to discharge with patient and caregiver     Problem: Skin/Tissue Integrity  Goal: Skin integrity remains intact  Description: 1.  Monitor for areas of redness and/or skin breakdown  2.  Assess vascular access sites hourly  3.  Every 4-6 hours minimum:  Change oxygen saturation probe site  4.  Every 4-6 hours:  If on nasal continuous positive airway pressure, respiratory therapy assess nares and determine need for appliance change or resting period  Outcome: Progressing  Flowsheets (Taken 2/19/2025 2145)  Skin Integrity Remains Intact: Monitor for areas of redness and/or skin breakdown     Problem: ABCDS Injury Assessment  Goal: Absence of physical injury  Outcome: Progressing   Care plan reviewed with patient.  Patient verbalize understanding of the plan of care and contribute to goal setting.

## 2025-02-20 NOTE — DISCHARGE SUMMARY
Resident Discharge Summary (Hospitalist)      Patient: Tiago Tian 89 y.o. male  : 1933  MRN: [id number]   Account: [id number]   Patient's PCP: Iain Hannah MD    Admit Date: 2025   Discharge Date: 2025      Admitting Physician: Jos Davis, DO  Discharge Physician: Bill Ibarra III, DO       Discharge Diagnoses:  DNR-CC code status: Family/POA elected to transition the patient to DNR-CC code status, AM 2024. Palliative care following.   Supraventricular tachycardia: Multiple rapid response was called PM 2025 for SVT.  Patient received 2 doses of adenosine, along with a dose of Lopressor, patient's rate did not improve.  Was subsequently started on Cardizem drip and transferred to stepdown unit.  Patient was transition to oral diltiazem for comfort, with metoprolol.  New onset CHF with left pleural effusion with consolidation: Last Echo 2024 showed EF or 55-60% with normal thickness and function. Patient presented with symptoms of heart failure and volume overload. CXR shows cardiomegaly with vascular congestion and a left sided pleural effusion with dense consolidation at left lung base  Paroxysmal atrial fibrillation: [id number].  Home GDMT includes metoprolol, no anticoagulation, due to risk of fall.   Urinary retention in the setting of BPH: Chronically has indwelling catheter due to urinary retention.  S/p 6 attempts of Toro placement.   Elevated troponin: Troponin at baseline of around 54.  Patient send chest pain at this time.   CKD stage IIIb: Creatinine at baseline, 1.7  Hyperlipidemia: Not on statin  Hypertension: Noted  Anemia and thrombocytopenia: Noted      Hospital Course:   Tiago Tian is a 89 y.o. male with PMHx paroxysmal A-fib admitted to [facility] on 2025 for shortness of breath and chest pain.  Patient was admitted for fluid overload status. He was found to have an elevated troponin and BNP. Chest x-ray showed

## 2025-02-20 NOTE — CARE COORDINATION
2/20/25, 9:43 AM EST    Patient goals/plan/ treatment preferences discussed by  and .  Patient goals/plan/ treatment preferences reviewed with patient/ family.  Patient/ family verbalize understanding of discharge plan and are in agreement with goal/plan/treatment preferences.  Understanding was demonstrated using the teach back method.  AVS provided by RN at time of discharge, which includes all necessary medical information pertaining to the patients current course of illness, treatment, post-discharge goals of care, and treatment preferences.     Services At/After Discharge: Hospice    Patient to discharge home with [facility].     EMIGDIO was notified to schedule transportation home.     SW scheduled transport for 11am.     Attending made aware.     RN made aware.

## 2025-02-20 NOTE — PROGRESS NOTES
Hospitalist Progress Note  Internal Medicine Resident      Patient: Tiago Tian 89 y.o. male      Unit/Bed: -14/014-A    Admit Date: 2/17/2025      ASSESSMENT AND PLAN  Active Problems  DNR-CC code status: Family/POA elected to transition the patient to DNR-CC code status, AM 2/19/2024. Palliative care following.   Supraventricular tachycardia: Multiple rapid response was called PM 2/17/2025 for SVT.  Patient received 2 doses of adenosine, along with a dose of Lopressor, patient's rate did not improve.  Was subsequently started on Cardizem drip and transferred to stepdown unit.  Continue to monitor on telemetry  Cardizem GGT  Plan to transition to oral Cardizem for comfort  Metoprolol 80 mg oral daily  New onset CHF with left pleural effusion with consolidation. : Last Echo 11/2024 showed EF or 55-60% with normal thickness and function. Patient presented with symptoms of heart failure and volume overload. CXR shows cardiomegaly with vascular congestion and a left sided pleural effusion with dense consolidation at left lung base  S/p thoracentesis, with bloody fluid aspirated  Ceftriaxone 1000 mg every 24 hours  TTE pending  Paroxysmal atrial fibrillation: [id number] 5.  Home GDMT includes metoprolol, no anticoagulation, due to risk of fall.  Continue home metoprolol.  Echo pending as above.  Urinary retention in the setting of BPH: Chronically has indwelling catheter due to urinary retention.  S/p 6 attempts of Toro placement.  Urology following.  Elevated troponin: Troponin at baseline of around 54.  Patient send chest pain at this time.  Continue to monitor.  Likely secondary to demand ischemia.  Resolved Problems    Chronic Conditions (reviewed and stable unless otherwise stated)  CKD stage IIIb: Creatinine 1.7, at baseline.  Continue to monitor.  Hyperlipidemia: Patient's statin not continued in the inpatient setting due to not \"being on formulary\", per initial HPI.  Hypertension: Continue 
    Hospitalist Progress Note  Internal Medicine Resident      Patient: Tiago Tian 89 y.o. male      Unit/Bed: K-14/014-A    Admit Date: 2/17/2025      ASSESSMENT AND PLAN  Active Problems  Supraventricular tachycardia: Multiple rapid response was called PM 2/17/2025 for SVT.  Patient received 2 doses of adenosine, along with a dose of Lopressor, patient's rate did not improve.  Was subsequently started on Cardizem drip and transferred to stepdown unit.  Continue to monitor on telemetry  Cardizem  Cardizem GGT  Metoprolol 80 mg oral daily  New onset CHF with left pleural effusion: Last Echo 11/2024 showed EF or 55-60% with normal thickness and function. Patient presented with symptoms of heart failure and volume overload. CXR shows cardiomegaly with vascular congestion and a left sided pleural effusion with dense consolidation at left lung base  S/p thoracentesis, with bloody fluid aspirated  Ceftriaxone 1000 mg every 24 hours  TTE pending  Paroxysmal atrial fibrillation: [id number] 5.  Home GDMT includes metoprolol, no anticoagulation, due to risk of fall.  Continue home metoprolol.  Echo pending as above.  Urinary retention in the setting of BPH: Chronically has indwelling catheter due to urinary retention.  S/p 6 attempts of Toro placement.  Urology following.  Elevated troponin: Troponin at baseline of around 54.  Patient send chest pain at this time.  Continue to monitor.  Likely secondary to demand ischemia.  Goals of care: Multiple documented cases of patient not wanting to proceed with medical care late 2024.  Palliative care following.  Resolved Problems    Chronic Conditions (reviewed and stable unless otherwise stated)  CKD stage IIIb: Creatinine 1.7, at baseline.  Continue to monitor.  Hyperlipidemia: Patient's statin not continued in the inpatient setting due to not \"being on formulary\", per initial HPI.  Hypertension: Continue metoprolol as above  Anemia and thrombocytopenia: Noted      LDA: 
    Pharmacist Review and Automatic Dose Adjustment of Prophylactic Enoxaparin      The reviewing pharmacist has made an adjustment to the ordered enoxaparin dose or converted to UFH per the approved [facility] protocol and table as identified below.        Tiago Tian is a 89 y.o. male.     Recent Labs     02/17/25  0935 02/17/25  1941 02/18/25  0537   CREATININE 1.7* 1.9* 1.9*       Estimated Creatinine Clearance: 28 mL/min (A) (based on SCr of 1.9 mg/dL (H)).    Height:   Ht Readings from Last 1 Encounters:   11/18/24 1.829 m (6')     Weight:  Wt Readings from Last 1 Encounters:   02/18/25 81 kg (178 lb 8 oz)               Plan: Based upon the patient's weight and renal function     Ordered: Enoxaparin 40 mg SUBQ daily     Changed/converted to     New Order: Enoxaparin  30 mg SUBQ daily       Thank you,  Alea Alarcon, [facility]  2/18/2025, 11:42 AM    
    Urology Progress Note    Reason for Consult:  Indwelling catheter placement   Requesting Physician:  medicine     History Obtained From:  patient, electronic medical record     Chief Complaint: SOB    Subjective: \"    Patient is resting in bed, voiding mostly tea colored urine via jane    IMPRESSION/Plan:    Cont jane at discharge     Urinary retention - managed with chronic jane  Hematuria - likely from traumatic jane, Ux neg, good UOP, voiding yellow colored urine, HGB stable     Hand irrigate as needed, discussed with nurse  Will follow peripherally, call with questions  Has OV 3/17 for catheter exchange        Vitals:  BP (!) 134/92   Pulse (!) 112   Temp 99.8 °F (37.7 °C) (Rectal)   Resp 30   Ht 1.829 m (6')   Wt 81 kg (178 lb 8 oz)   SpO2 97%   BMI 24.21 kg/m²   Temp  Av.3 °F (36.8 °C)  Min: 97.8 °F (36.6 °C)  Max: 99.8 °F (37.7 °C)    Intake/Output Summary (Last 24 hours) at 2025 1028  Last data filed at 2025 1013  Gross per 24 hour   Intake 135 ml   Output 750 ml   Net -615 ml       Social History     Socioeconomic History    Marital status:      Spouse name: Denise Traylor    Number of children: 4    Years of education: 8    Highest education level: Not on file   Occupational History    Not on file   Tobacco Use    Smoking status: Never    Smokeless tobacco: Never   Vaping Use    Vaping status: Never Used   Substance and Sexual Activity    Alcohol use: No    Drug use: No    Sexual activity: Not Currently   Other Topics Concern    Not on file   Social History Narrative    Not on file     Social Determinants of Health     Financial Resource Strain: Low Risk  (3/6/2024)    Overall Financial Resource Strain (CARDIA)     Difficulty of Paying Living Expenses: Not hard at all   Food Insecurity: No Food Insecurity (2025)    Hunger Vital Sign     Worried About Running Out of Food in the Last Year: Never true     Ran Out of Food in the Last Year: Never true   Transportation Needs: 
  1930 pt ambulated to bathroom HR in 200s. Rapid called.  1933 Pt back to bed, BP 93/70HR 213, patches applied  1934 /71 , EKG complete  1935 CBC, CMP, MG ordered  1937 EKG NSR HR 66 patient self converted  1941 rapid ended. Patient ordered bedrest.        
  1949 Spoke with Courtney Bashir NP from urology. Day shift had x4 unsuccessful attempts placing a jane for exchange. Even with resource nurse attempting.Courtney ordered for urojet and wanted use to try 18 F jane and if unable then to use an 18F coude. At this time no resource nurse to assist. Both tries were unsuccessful. Patient has not voided bladder scan showed 366 ml. Courtney informed. Response was to monitor since not having pain. PVR intermittently if pain or concern. Limit fluids if not needed. Urology will assess in AM and place jane.    2203 Resource nurse, Braden, now available. As patient being transported to  this nurse did inform Braden of situation and asked if he could attempt insertion.      
  2123 patient at rest HR back in 200s. Rapid called again  2124 Team at bedside.  2125 /95 , , O2 100%  2129 EKG= SVT  2132 /102  2135 Vagal maneuvers performed  2138 /98  O2 94%  2140 6mg adenosine /118   2142  briefly then back to 220  2142 /96 12 mg adenosine pushed  2143  /106, EKG -> ST with PVC  2149 HR back in 220s /173  2152  /114 EKG -> SVT, 5mg lopressor  pushed  2157 orders for cardizem bolus + gtt  2158 /83   O2 97%  2201 /112   2202 EKG Afib RVR   2203 Transferred to 4k report given by this nurse to 4k nurse at bedside    
  Physician Progress Note      PATIENT:               JULITA MAJOR  CSN #:                  [id number]  :                       1933  ADMIT DATE:       2025 9:12 AM  DISCH DATE:  RESPONDING  PROVIDER #:        Lizandro Mora MD          QUERY TEXT:    Patient admitted with New onset of congestive heart failure. Per dietitian,   patient meets ASPEN criteria for severe malnutrition. If possible, please   document in progress notes and discharge summary if you are evaluating and /or   treating any of the following:    The medical record reflects the following:  Risk Factors: 89yo, CVA, CHF, HLD, HTN, PAC, Paget disease of bone, lap digna   2018, urinary retention has catheter  Clinical Indicators: Energy Intake:  75% or less estimated energy requirements   for 1 month or longer, Body Fat Loss:  Severe body fat loss Orbital (moderate   fat losses in triceps and buccal regions), Muscle Mass Loss:   (moderate)   Temples (temporalis), Clavicles (pectoralis & deltoids), Calf (gastrocnemius),   Hand (interosseous), Fluid Accumulation:  Mild Genitals, Wound: Stage II   (stage 2 b/l buttock wounds)  Treatment: labs, imaging, dietitian consult, ADULT DIET; Regular, ADULT ORAL   NUTRITION SUPPLEMENT, IV antibiotics, IV lasix, PO glycolax    ASPEN Criteria:    https://aspenjournals.onlinelibrary.parkinson.com/doi/full/10.1177/[email or web address]  5    Thank you,  Shawnee Lebron), RN BSN  Clinical   Options provided:  -- Protein calorie malnutrition severe  -- Other - I will add my own diagnosis  -- Disagree - Not applicable / Not valid  -- Disagree - Clinically unable to determine / Unknown  -- Refer to Clinical Documentation Reviewer    PROVIDER RESPONSE TEXT:    This patient has severe protein calorie malnutrition.    Query created by: Shawnee Godwin on 2025 12:29 PM      QUERY TEXT:    Pt admitted with New onset of congestive heart failure.  H&P states \"New   onset of congestive heart 
[facility]  SPEECH THERAPY  STRZ ICU STEPDOWN TELEMETRY 4K  Clinical Swallow Evaluation+Tx    Discharge Recommendations: SNF  DIET ORDER RECOMMENDATIONS AFTER EVALUATION: Regular diet and thin liquids   Strategies:  Full Upright Position, Small Bite/Sip, Pulmonary Monitoring, and Monitor for Fatigue     SLP Individual Minutes  Time In: 1300  Time Out: 1321  Minutes: 21  Timed Code Treatment Minutes: 0 Minutes       Date: 2025  Patient Name: Tiago Tian      CSN: [id number]   : 1933  (89 y.o.)  Gender: male   Referring Physician:  Jos Davis DO    Diagnosis: New onset of congestive heart failure (HCC)    History of Present Illness/Injury: Per initial HPI:Tiago Tian is a 89 y.o. male with PMHx of paroxysmal A-fib who presents to [facility] with shortness of breath and chest pain.  Patient reports a 2 to 3-day history of chest pain associated with shortness of breath and dyspnea on exertion.  He reports that he it has only worsened and denies any alleviating or aggravating factors.  He denies a history of cardiac or lung disease.  He had a recent echo in November that showed an EF of 55 to 60%.  He is currently taking medication only for hyperlipidemia and hypertension.  He denies any anticoagulation.  Family reports that he takes all of his medications as prescribed.  He denies any history of drug use, tobacco use, alcohol use.  He endorses fatigue, dyspnea on exertion, shortness of breath, leg swelling, chest pain.  He denies any nausea, vomiting, fever, chills, sinus pain, sinus pressure, palpitations, diarrhea, constipation, headache, lightheadedness, dizziness, change in urination.     Hx obtained from: Patient and Family     ED course: Patient presented to the emergency department for the symptoms above.  He was found to have an elevated troponin and BNP.  Chest x-ray showed consolidation versus pleural effusion versus atelectasis so CT of the chest was 
Comprehensive Nutrition Assessment    Type and Reason for Visit: Initial, Positive nutrition screen, Wound    Nutrition Recommendations/Plan:   Continue diet as ordered- regular with ground meats and extra gravy.  Initiate Ensure Clear TID.   Consider appetite stimulant.      Malnutrition Assessment:  Malnutrition Status: Severe malnutrition  Context: Chronic Illness       Findings of the 6 clinical characteristics of malnutrition:  Energy Intake:  75% or less estimated energy requirements for 1 month or longer  Weight Loss:  No weight loss     Body Fat Loss:  Severe body fat loss Orbital (moderate fat losses in triceps and buccal regions)   Muscle Mass Loss:   (moderate) Temples (temporalis), Clavicles (pectoralis & deltoids), Calf (gastrocnemius), Hand (interosseous)  Fluid Accumulation:  Mild Genitals   Strength:  Not Performed    Nutrition Assessment:    Pt. severely malnourished AEB criteria listed above. At risk for further nutritional compromise r/t admitted d/t SOB, chest pain, and leg swelling. Found to have elevated troponin and BMP, Chest xray showed consolidation vs pleural effusion vs atelectasis. Multiple rapid responses called on 2/17 d/t SVT. Noted underlying medical condition (PMHx CVA, CHF, HLD, HTN, PAC, Paget disease of bone, lap digna 2018, urinary retention has catheter).    Nutrition Related Findings:    Pt. Report/Treatments/Miscellaneous: Patient seen, sitting up in bed with 2 daughters at bedside. Per patient his appetite has been poor. He usually eats 3 very small meals per day. Daughter reports that he does not do well with milk products and declined Ensure, willing to trial Ensure Clear. Patient denies any recent nausea/ vomiting, but is having constipation.   GI Status: Last BM 2/16 per daughter  Wound: Stage II (stage 2 b/l buttock wounds)   Edema:  trace , per flowsheet   Pertinent Labs:   Lab Results   Component Value Date    LABA1C 4.9 02/17/2025    LABA1C 5.5 08/28/2021    
Discharge teaching and instructions for diagnosis/procedure of CHF completed with patient using teachback method. AVS reviewed. Printed prescriptions given to daughter. Daughter voiced understanding regarding prescriptions, follow up appointments, and care of self at home. Discharged via cart/stretcher to  home with support per EMS transportation   
Limited echo completed bedside, Dr Holley to read.  
Patient received sacramental anointing by a . If you are in need of  support, please call [phone]. If you are in need of a  after 6:30pm, please call the house supervisor for the on-call .      Roxanna Fisher  [facility] Health Coordinator  [phone]   
Pt admitted to  6K14 from ED.   Complaints: Shortness of breath.    IV site free of s/s of infection or infiltration. Vital signs obtained. Assessment and data collection initiated. Two nurse skin assessment performed by Smitha GAMBLE and Yara GAMBLE. Oriented to room. Policies and procedures for 6K explained. Smitha GAMBLE discussed hourly rounding with patient addressing 5 P's. Fall prevention and safety brochure discussed with patient.  Bed alarm on. Call light in reach.  The best day to schedule a follow up Dr appointment is:  Monday, Tuesday, and Wednesday a.m.  Explained patients right to have family, representative or physician notified of their admission.  Patient has Declined for physician to be notified.  Patient has Declined for family/representative to be notified. All questions answered with no further questions at this time.       
This RN in to exchange jane catheter in which the patient arrived to the floor with. Yara RN to assist. Attempted twice. Resource Nurse Sharyn to bedside. Attempted twice, last attempt with [place].    Spoke with Attending Dany Fisher    Urology consulted.   
of Transportation (Medical): No     Lack of Transportation (Non-Medical): No   Physical Activity: Inactive (3/6/2024)    Exercise Vital Sign     Days of Exercise per Week: 0 days     Minutes of Exercise per Session: 0 min   Stress: Not on file   Social Connections: Not on file   Intimate Partner Violence: Not on file   Housing Stability: Low Risk  (2/17/2025)    Housing Stability Vital Sign     Unable to Pay for Housing in the Last Year: No     Number of Times Moved in the Last Year: 1     Homeless in the Last Year: No     Family History   Problem Relation Age of Onset    Diabetes Mother     Stroke Father     Stroke Sister     Cancer Brother     Heart Disease Brother     Stroke Brother     Heart Disease Brother     Stroke Brother      Allergies   Allergen Reactions    Asa [Aspirin]      bleeding         Constitutional: Alert no acute distress, and cooperative to examination with appropriate mood and affect.   HEENT:   Head:         Normocephalic and atraumatic.          Mucous membranes are normal.   Eyes:         EOM are normal.  Nose:    The external appearance of the nose is normal  Ears:     The ears appear normal to external inspection.   Cardiovascular:       Normal rate, regular rhythm.  Pulmonary/Chest:  Normal respiratory rate and rhthym. No use of accessory muscles. Lungs clear bilaterally.  Abdominal:          Soft. No tenderness. Active bowel sounds.  Genitalia:    Toro catheter draining tea colored urine  Musculoskeletal:    Normal range of motion. He exhibits no edema or tenderness of lower extremities.   Extremities:    No cyanosis, clubbing, or edema present.  Neurological:    Alert     Labs:  WBC:    Lab Results   Component Value Date/Time    WBC 10.5 02/19/2025 04:08 AM     Hemoglobin/Hematocrit:    Lab Results   Component Value Date/Time    HGB 11.1 02/19/2025 04:08 AM    HCT 34.7 02/19/2025 04:08 AM     BMP:    Lab Results   Component Value Date/Time     02/19/2025 04:08 AM    K 4.5 
referral    Electronically signed by Chaplain Ramiro Castro on 2/17/2025 at 8:30 PM

## 2025-02-21 ENCOUNTER — CARE COORDINATION (OUTPATIENT)
Dept: CARE COORDINATION | Age: 89
End: 2025-02-21

## 2025-02-21 NOTE — CARE COORDINATION
Contacted [facility].  Spoke with Chandrika.  She confirmed pt is active with hospice services.  SOC 2/20/25.      Care Transitions will not be following pt.      Isela Sol RN

## 2025-02-22 LAB
BACTERIA SPEC ANAEROBE CULT: NORMAL
BACTERIA SPEC BFLD CULT: NORMAL
GRAM STN SPEC: NORMAL

## (undated) DEVICE — 3M™ WARMING BLANKET, UPPER BODY, 10 PER CASE, 42268: Brand: BAIR HUGGER™

## (undated) DEVICE — STRIP,CLOSURE,WOUND,MEDI-STRIP,1/2X4: Brand: MEDLINE

## (undated) DEVICE — INSUFFLATION NEEDLE TO ESTABLISH PNEUMOPERITONEUM.: Brand: INSUFFLATION NEEDLE

## (undated) DEVICE — EXCEL 10FT (3.05 M) INSUFFLATION TUBING SET WITH 0.1 MICRON FILTER: Brand: EXCEL

## (undated) DEVICE — CORE TRUMPET FOR SINGLE SOLUTION BAG: Brand: CORE DYNAMICS

## (undated) DEVICE — GLOVE ORANGE PI 8   MSG9080

## (undated) DEVICE — GAUZE,SPONGE,4"X4",12PLY,STERILE,LF,2'S: Brand: MEDLINE

## (undated) DEVICE — BAG SPEC REM 224ML W4XL6IN DIA10MM 1 HND GYN DISP ENDOPCH

## (undated) DEVICE — BLADE CLIPPER GEN PURP NS

## (undated) DEVICE — ROYAL SILK SURGICAL GOWN, XXL: Brand: CONVERTORS

## (undated) DEVICE — PENROSE DRAIN 18 X .5" SILICONE: Brand: MEDLINE

## (undated) DEVICE — TROCAR ENDOSCP SHFT L100MM DIA5MM DIL TIP ENDOPATH XCEL

## (undated) DEVICE — APPLICATOR ENDOSCP L34CM W/ S STL CANN PLAS OBT STYL FOR

## (undated) DEVICE — YANKAUER,BULB TIP,W/O VENT,RIGID,STERILE: Brand: MEDLINE

## (undated) DEVICE — APPLIER CLP M L L11.4IN DIA10MM ENDOSCP ROT MULT FOR LIG

## (undated) DEVICE — TROCAR ENDOSCP L100MM DIA11MM DIL TIP STBL SL DISP ENDOPATH

## (undated) DEVICE — SOLUTION IV IRRIG POUR BRL 0.9% SODIUM CHL 2F7124

## (undated) DEVICE — GLOVE ORANGE PI 7   MSG9070

## (undated) DEVICE — SUTURE VCRL SZ 4-0 L27IN ABSRB UD L19MM FS-2 3/8 CIR REV J422H

## (undated) DEVICE — SOLUTION IV 1000ML 0.9% SOD CHL PH 5 INJ USP VIAFLX PLAS

## (undated) DEVICE — TUBING, SUCTION, 1/4" X 20', STRAIGHT: Brand: MEDLINE INDUSTRIES, INC.

## (undated) DEVICE — MARKER,SKIN,WI/RULER AND LABELS: Brand: MEDLINE

## (undated) DEVICE — COAGULATOR ELECSURG BLADE 10 FTX1 IN PTFE STRL ULTRACLEAN

## (undated) DEVICE — Z DISCONTINUED BY MEDLINE USE 2711682 TRAY SKIN PREP DRY W/ PREM GLV

## (undated) DEVICE — LINER SUCT CANSTR 1500CC SEMI RIG W/ POR HYDROPHOBIC SHUT

## (undated) DEVICE — CHLORAPREP 26ML ORANGE

## (undated) DEVICE — UNIVERSAL MONOPOLAR LAPAROSCOPIC CABLE 10FT, 4MM PIN CONNECTOR: Brand: CONMED